# Patient Record
Sex: FEMALE | Race: WHITE | NOT HISPANIC OR LATINO | Employment: OTHER | URBAN - METROPOLITAN AREA
[De-identification: names, ages, dates, MRNs, and addresses within clinical notes are randomized per-mention and may not be internally consistent; named-entity substitution may affect disease eponyms.]

---

## 2017-12-19 ENCOUNTER — OFFICE VISIT (OUTPATIENT)
Dept: URGENT CARE | Facility: CLINIC | Age: 67
End: 2017-12-19
Payer: COMMERCIAL

## 2017-12-19 PROCEDURE — 99203 OFFICE O/P NEW LOW 30 MIN: CPT

## 2017-12-20 NOTE — PROGRESS NOTES
Assessment  1  Cough variant asthma (493 82) (J45 991)    Plan  Cough variant asthma    · Azithromycin 250 MG Oral Tablet (Zithromax Z-Gabe); TAKE AS DIRECTED   · Montelukast Sodium 10 MG Oral Tablet (Singulair); TAKE 1 TABLET DAILY ASDIRECTED    Discussion/Summary  Discussion Summary: We are treating this as a cough variant asthma  Use medication as written  Increase fluids  The other possibility is a low-grade infection with an atypical bacteria  If you fail with the Singulair, try the antibiotic  Follow-up with your family physician for further evaluation if he did not respond to either one of these interventions  Chief Complaint  1  Cough  Chief Complaint Free Text Note Form: Pt c/o cough X 4 weeks      History of Present Illness  HPI: Patient is apparently very sensitive to spray, and some perfumes, etc  She has been coughing now for about 4 weeks  Apparently it was made worse after she had her hair dyed recently  Hospital Based Practices Required Assessment:  Pain Assessment  the patient states they do not have pain  Abuse And Domestic Violence Screen   Yes, the patient is safe at home  Depression And Suicide Screen  No, the patient has not had thoughts of hurting themself  No, the patient has not felt depressed in the past 7 days  Prefered Language is  [place]  Primary Language is  English  Review of Systems  Focused-Female:  Constitutional: No fever, no chills, feels well, no tiredness, no recent weight gain or loss  ENT: no ear ache, no loss of hearing, no nosebleeds or nasal discharge, no sore throat or hoarseness  Respiratory: as noted in HPI  Musculoskeletal: no complaints of arthralgia, no myalgia, no joint swelling or stiffness, no limb pain or swelling  Integumentary: no complaints of skin rash or lesion, no itching or dry skin, no skin wounds  Current Meds   1  Simvastatin 10 MG Oral Tablet; Therapy: (Recorded:[id number]) to Recorded   2   Synthroid 50 MCG Oral Tablet; Therapy: (Recorded:[id number]) to Recorded    Allergies  1  No Known Drug Allergies    Vitals  Signs   Recorded: [id number] 12:29PM   Temperature: 98 1 F  Heart Rate: 78  Respiration: 16  Systolic: 490  Diastolic: 69  Height: 5 ft 1 in  Weight: 122 lb   BMI Calculated: 23 05  BSA Calculated: 1 53  O2 Saturation: 99  Pain Scale: 0    Physical Exam   Constitutional  General appearance: No acute distress, well appearing and well nourished  Eyes  Conjunctiva and lids: No swelling, erythema or discharge  Ears, Nose, Mouth, and Throat  External inspection of ears and nose: Normal    Oropharynx: Normal with no erythema, edema, exudate or lesions  Pulmonary  Respiratory effort: No increased work of breathing or signs of respiratory distress  Auscultation of lungs: Clear to auscultation  Cardiovascular  Auscultation of heart: Normal rate and rhythm, normal S1 and S2, without murmurs  Musculoskeletal  Gait and station: Normal    Digits and nails: Normal without clubbing or cyanosis  Inspection/palpation of joints, bones, and muscles: Normal    Skin  Skin and subcutaneous tissue: Normal without rashes or lesions  Psychiatric  Orientation to person, place, and time: Normal    Mood and affect: Normal    Additional Exam:  there is no tenderness over the facial sinuses        Signatures   Electronically signed by : Ernst Carrington MD; Dec 19 2017  6:04PM EST                       (Author)

## 2018-01-23 VITALS
RESPIRATION RATE: 16 BRPM | DIASTOLIC BLOOD PRESSURE: 69 MMHG | HEIGHT: 61 IN | WEIGHT: 122 LBS | BODY MASS INDEX: 23.03 KG/M2 | SYSTOLIC BLOOD PRESSURE: 125 MMHG | TEMPERATURE: 98.1 F | HEART RATE: 78 BPM | OXYGEN SATURATION: 99 %

## 2020-05-13 ENCOUNTER — APPOINTMENT (EMERGENCY)
Dept: RADIOLOGY | Facility: HOSPITAL | Age: 70
End: 2020-05-13
Payer: COMMERCIAL

## 2020-05-13 ENCOUNTER — HOSPITAL ENCOUNTER (EMERGENCY)
Facility: HOSPITAL | Age: 70
Discharge: HOME | End: 2020-05-14
Attending: EMERGENCY MEDICINE | Admitting: EMERGENCY MEDICINE
Payer: COMMERCIAL

## 2020-05-13 DIAGNOSIS — R06.02 SHORTNESS OF BREATH: ICD-10-CM

## 2020-05-13 DIAGNOSIS — R03.0 ELEVATED BLOOD PRESSURE READING: ICD-10-CM

## 2020-05-13 DIAGNOSIS — R68.89 CONGESTION OF THROAT: Primary | ICD-10-CM

## 2020-05-13 PROCEDURE — 94640 AIRWAY INHALATION TREATMENT: CPT

## 2020-05-13 PROCEDURE — 99285 EMERGENCY DEPT VISIT HI MDM: CPT

## 2020-05-13 PROCEDURE — 71045 X-RAY EXAM CHEST 1 VIEW: CPT

## 2020-05-13 PROCEDURE — 99285 EMERGENCY DEPT VISIT HI MDM: CPT | Performed by: EMERGENCY MEDICINE

## 2020-05-13 PROCEDURE — 94640 AIRWAY INHALATION TREATMENT: CPT | Performed by: EMERGENCY MEDICINE

## 2020-05-13 RX ORDER — LEVOTHYROXINE SODIUM 0.05 MG/1
50 TABLET ORAL
COMMUNITY
Start: 2018-07-31

## 2020-05-13 RX ORDER — FAMOTIDINE 10 MG
10 TABLET ORAL DAILY
COMMUNITY

## 2020-05-13 RX ORDER — METRONIDAZOLE 10 MG/G
1 GEL TOPICAL DAILY
COMMUNITY
Start: 2008-11-06

## 2020-05-13 RX ORDER — ALBUTEROL SULFATE 90 UG/1
2 AEROSOL, METERED RESPIRATORY (INHALATION) EVERY 4 HOURS PRN
COMMUNITY
Start: 2017-12-21 | End: 2020-07-20 | Stop reason: SDUPTHER

## 2020-05-13 RX ORDER — ALBUTEROL SULFATE 2.5 MG/3ML
5 SOLUTION RESPIRATORY (INHALATION) ONCE
Status: COMPLETED | OUTPATIENT
Start: 2020-05-13 | End: 2020-05-14

## 2020-05-14 VITALS
TEMPERATURE: 97.9 F | BODY MASS INDEX: 20.77 KG/M2 | HEART RATE: 79 BPM | SYSTOLIC BLOOD PRESSURE: 141 MMHG | RESPIRATION RATE: 14 BRPM | WEIGHT: 110 LBS | OXYGEN SATURATION: 97 % | HEIGHT: 61 IN | DIASTOLIC BLOOD PRESSURE: 64 MMHG

## 2020-05-14 LAB
ANION GAP SERPL CALCULATED.3IONS-SCNC: 5 MMOL/L (ref 4–13)
ATRIAL RATE: 68 BPM
ATRIAL RATE: 81 BPM
BASOPHILS # BLD AUTO: 0.03 THOUSANDS/ΜL (ref 0–0.1)
BASOPHILS NFR BLD AUTO: 0 % (ref 0–1)
BUN SERPL-MCNC: 14 MG/DL (ref 5–25)
CALCIUM SERPL-MCNC: 9.3 MG/DL (ref 8.3–10.1)
CHLORIDE SERPL-SCNC: 98 MMOL/L (ref 100–108)
CO2 SERPL-SCNC: 31 MMOL/L (ref 21–32)
CREAT SERPL-MCNC: 0.67 MG/DL (ref 0.6–1.3)
EOSINOPHIL # BLD AUTO: 0.03 THOUSAND/ΜL (ref 0–0.61)
EOSINOPHIL NFR BLD AUTO: 0 % (ref 0–6)
ERYTHROCYTE [DISTWIDTH] IN BLOOD BY AUTOMATED COUNT: 12.7 % (ref 11.6–15.1)
GFR SERPL CREATININE-BSD FRML MDRD: 89 ML/MIN/1.73SQ M
GLUCOSE SERPL-MCNC: 92 MG/DL (ref 65–140)
HCT VFR BLD AUTO: 37.7 % (ref 34.8–46.1)
HGB BLD-MCNC: 13 G/DL (ref 11.5–15.4)
IMM GRANULOCYTES # BLD AUTO: 0.04 THOUSAND/UL (ref 0–0.2)
IMM GRANULOCYTES NFR BLD AUTO: 1 % (ref 0–2)
LYMPHOCYTES # BLD AUTO: 2.28 THOUSANDS/ΜL (ref 0.6–4.47)
LYMPHOCYTES NFR BLD AUTO: 31 % (ref 14–44)
MCH RBC QN AUTO: 30.4 PG (ref 26.8–34.3)
MCHC RBC AUTO-ENTMCNC: 34.5 G/DL (ref 31.4–37.4)
MCV RBC AUTO: 88 FL (ref 82–98)
MONOCYTES # BLD AUTO: 0.73 THOUSAND/ΜL (ref 0.17–1.22)
MONOCYTES NFR BLD AUTO: 10 % (ref 4–12)
NEUTROPHILS # BLD AUTO: 4.37 THOUSANDS/ΜL (ref 1.85–7.62)
NEUTS SEG NFR BLD AUTO: 58 % (ref 43–75)
NT-PROBNP SERPL-MCNC: 166 PG/ML
P AXIS: 59 DEGREES
P AXIS: 65 DEGREES
PLATELET # BLD AUTO: 187 THOUSANDS/UL (ref 149–390)
PMV BLD AUTO: 8.9 FL (ref 8.9–12.7)
POTASSIUM SERPL-SCNC: 3.5 MMOL/L (ref 3.5–5.3)
PR INTERVAL: 154 MS
PR INTERVAL: 158 MS
QRS AXIS: 86 DEGREES
QRS AXIS: 90 DEGREES
QRSD INTERVAL: 86 MS
QRSD INTERVAL: 88 MS
QT INTERVAL: 390 MS
QT INTERVAL: 398 MS
QTC INTERVAL: 423 MS
QTC INTERVAL: 453 MS
RBC # BLD AUTO: 4.27 MILLION/UL (ref 3.81–5.12)
SODIUM SERPL-SCNC: 134 MMOL/L (ref 136–145)
T WAVE AXIS: 49 DEGREES
T WAVE AXIS: 59 DEGREES
TROPONIN I SERPL-MCNC: 0.03 NG/ML
TROPONIN I SERPL-MCNC: 0.03 NG/ML
VENTRICULAR RATE: 68 BPM
VENTRICULAR RATE: 81 BPM
WBC # BLD AUTO: 7.48 THOUSAND/UL (ref 4.31–10.16)

## 2020-05-14 PROCEDURE — 80048 BASIC METABOLIC PNL TOTAL CA: CPT | Performed by: EMERGENCY MEDICINE

## 2020-05-14 PROCEDURE — 83880 ASSAY OF NATRIURETIC PEPTIDE: CPT | Performed by: EMERGENCY MEDICINE

## 2020-05-14 PROCEDURE — 84484 ASSAY OF TROPONIN QUANT: CPT | Performed by: EMERGENCY MEDICINE

## 2020-05-14 PROCEDURE — 93005 ELECTROCARDIOGRAM TRACING: CPT

## 2020-05-14 PROCEDURE — 36415 COLL VENOUS BLD VENIPUNCTURE: CPT | Performed by: EMERGENCY MEDICINE

## 2020-05-14 PROCEDURE — 93010 ELECTROCARDIOGRAM REPORT: CPT | Performed by: INTERNAL MEDICINE

## 2020-05-14 PROCEDURE — 85025 COMPLETE CBC W/AUTO DIFF WBC: CPT | Performed by: EMERGENCY MEDICINE

## 2020-05-14 RX ADMIN — ALBUTEROL SULFATE 5 MG: 2.5 SOLUTION RESPIRATORY (INHALATION) at 00:25

## 2020-05-14 RX ADMIN — IPRATROPIUM BROMIDE 0.5 MG: 0.5 SOLUTION RESPIRATORY (INHALATION) at 00:25

## 2020-06-15 ENCOUNTER — TELEPHONE (OUTPATIENT)
Dept: PULMONOLOGY | Facility: HOSPITAL | Age: 70
End: 2020-06-15

## 2020-06-15 RX ORDER — AZELASTINE 1 MG/ML
1 SPRAY, METERED NASAL 2 TIMES DAILY
COMMUNITY
Start: 2020-05-20 | End: 2020-10-19

## 2020-06-15 RX ORDER — FLUTICASONE PROPIONATE 50 MCG
1 SPRAY, SUSPENSION (ML) NASAL
COMMUNITY
End: 2020-10-19

## 2020-06-15 RX ORDER — AZITHROMYCIN 250 MG/1
TABLET, FILM COATED ORAL
COMMUNITY
Start: 2017-12-19 | End: 2020-06-19 | Stop reason: ALTCHOICE

## 2020-06-15 RX ORDER — GUAIFENESIN 100 MG/5ML
10 SOLUTION ORAL
COMMUNITY
End: 2020-10-19

## 2020-06-15 RX ORDER — SIMVASTATIN 10 MG
10 TABLET ORAL
COMMUNITY
Start: 2018-03-02

## 2020-06-15 RX ORDER — FEXOFENADINE HCL 180 MG/1
180 TABLET ORAL DAILY
COMMUNITY
End: 2021-11-19 | Stop reason: HOSPADM

## 2020-06-18 ENCOUNTER — TELEPHONE (OUTPATIENT)
Dept: PULMONOLOGY | Facility: CLINIC | Age: 70
End: 2020-06-18

## 2020-06-19 ENCOUNTER — OFFICE VISIT (OUTPATIENT)
Dept: PULMONOLOGY | Facility: HOSPITAL | Age: 70
End: 2020-06-19
Payer: COMMERCIAL

## 2020-06-19 VITALS
SYSTOLIC BLOOD PRESSURE: 140 MMHG | OXYGEN SATURATION: 97 % | TEMPERATURE: 98.2 F | HEART RATE: 75 BPM | BODY MASS INDEX: 21.23 KG/M2 | DIASTOLIC BLOOD PRESSURE: 60 MMHG | WEIGHT: 115.4 LBS | HEIGHT: 62 IN | RESPIRATION RATE: 18 BRPM

## 2020-06-19 DIAGNOSIS — J45.30 MILD PERSISTENT ASTHMA WITHOUT COMPLICATION: Primary | ICD-10-CM

## 2020-06-19 PROCEDURE — 99245 OFF/OP CONSLTJ NEW/EST HI 55: CPT | Performed by: INTERNAL MEDICINE

## 2020-07-20 DIAGNOSIS — J45.30 MILD PERSISTENT ASTHMA, UNSPECIFIED WHETHER COMPLICATED: Primary | ICD-10-CM

## 2020-07-20 RX ORDER — ALBUTEROL SULFATE 90 UG/1
2 AEROSOL, METERED RESPIRATORY (INHALATION) EVERY 4 HOURS PRN
Qty: 1 INHALER | Refills: 5 | Status: SHIPPED | OUTPATIENT
Start: 2020-07-20 | End: 2021-05-17 | Stop reason: SDUPTHER

## 2020-10-19 ENCOUNTER — OFFICE VISIT (OUTPATIENT)
Dept: PULMONOLOGY | Facility: HOSPITAL | Age: 70
End: 2020-10-19
Payer: COMMERCIAL

## 2020-10-19 VITALS
OXYGEN SATURATION: 97 % | HEART RATE: 82 BPM | DIASTOLIC BLOOD PRESSURE: 70 MMHG | BODY MASS INDEX: 21.98 KG/M2 | SYSTOLIC BLOOD PRESSURE: 138 MMHG | WEIGHT: 116.4 LBS | TEMPERATURE: 97 F | HEIGHT: 61 IN

## 2020-10-19 DIAGNOSIS — J45.30 MILD PERSISTENT ASTHMA WITHOUT COMPLICATION: ICD-10-CM

## 2020-10-19 DIAGNOSIS — J45.30 MILD PERSISTENT ASTHMA, UNSPECIFIED WHETHER COMPLICATED: Primary | ICD-10-CM

## 2020-10-19 PROCEDURE — 99213 OFFICE O/P EST LOW 20 MIN: CPT | Performed by: INTERNAL MEDICINE

## 2020-10-19 RX ORDER — ALBUTEROL SULFATE 2.5 MG/3ML
2.5 SOLUTION RESPIRATORY (INHALATION) EVERY 6 HOURS PRN
COMMUNITY
End: 2021-11-19 | Stop reason: HOSPADM

## 2020-12-28 DIAGNOSIS — J45.30 MILD PERSISTENT ASTHMA WITHOUT COMPLICATION: Primary | ICD-10-CM

## 2020-12-28 RX ORDER — BECLOMETHASONE DIPROPIONATE HFA 40 UG/1
2 AEROSOL, METERED RESPIRATORY (INHALATION) 2 TIMES DAILY
Qty: 1 INHALER | Refills: 5 | Status: SHIPPED | OUTPATIENT
Start: 2020-12-28 | End: 2021-11-19 | Stop reason: HOSPADM

## 2021-01-04 ENCOUNTER — TELEPHONE (OUTPATIENT)
Dept: OTHER | Facility: OTHER | Age: 71
End: 2021-01-04

## 2021-01-05 NOTE — PROGRESS NOTES
Called patient last evening, she was complaining of shortness of breath  She did use her nebulizer which she said helped  She does not have any associated wheezing, cough, fever, chills or palpitations  She does not have any known COVID contacts  She checked he temperature and it was 96F and her 02 was at 98%  Discussed that is possible this could be her asthma but her symptoms are not typical of an asthma exacerbation  Discussed that she will use her nebulizer once more before bed and can also try her rescue inhaler in the interim and she will call us tomorrow and we can discuss a short course of prednisone and possible imaging  Discussed also that if she really feels unwell, may need to go the ED for evaluation to rule out other causes  She expressed understanding and will try her nebulizer again this evening

## 2021-01-05 NOTE — TELEPHONE ENCOUNTER
Patricio Gardy called because she is having trouble breathing that she believes is due to her asthma

## 2021-01-07 NOTE — PROGRESS NOTES
PT Evaluation     Today's date: 2021  Patient name: Monisha Samuel  : 1950  MRN: [id number]  Referring provider: Myles Dawkins MD  Dx:   Encounter Diagnosis     ICD-10-CM    1  Atypical facial pain  G50 1                   Assessment  Assessment details: Juan Ramon Siddiqi is a 65year old female presenting to physical therapy with atypical facial pain  Patient presents with pain, decreased range of motion, postural deficits and decreased tolerance to activity  Due to these impairments patient has difficulty performing activities of daily living, recreational activities and engaging in social activities  Patient would benefit from skilled physical therapy services to address these impairments and to maximize function  Thank you for the referral    Impairments: abnormal muscle tone, abnormal or restricted ROM, abnormal movement, impaired physical strength, lacks appropriate home exercise program, pain with function and poor posture   Understanding of Dx/Px/POC: excellent  Goals  Impairment Goals:  1 ) Pt will have decreased sx at their worst by 50% in 2-4 weeks  2 ) Pt will have decreased tenderness to palpation right masseter and pterygoid musculature by 50% in 3-4 weeks  3 ) Pt will improved jaw opening without pain on the right in 4-6 weeks  4 ) Pt will have decreased headache frequency to 1-2x/week in 6-8 weeks  Functional Goals:  1 ) Pt will be independent in their home exercise program in 1 week  2 ) Pt will be able to complete all ADL's without jaw pain in 6-8 weeks  4 ) Pt will have an improved FOTO score of 95/100 in 6-8 weeks  Plan  Plan details: Patient does not feel comfortable being inside the clinic due to the pandemic and wishes to follow up virtually     Patient would benefit from: skilled PT  Planned therapy interventions: joint mobilization, manual therapy, neuromuscular re-education, patient education, postural training, strengthening, therapeutic exercise, home exercise program, graded exercise, graded activity, therapeutic activities, stretching, activity modification and abdominal trunk stabilization  Frequency: 1x week  Duration in weeks: 8  Plan of Care beginning date: 2021  Plan of Care expiration date: 3/4/2021  Treatment plan discussed with: patient        Subjective Evaluation    History of Present Illness  Mechanism of injury: Duane Goldman is a 65year old female presenting to physical therapy with atypical facial pain  She explains that her pain originated as tooth type pain and she was under the assumption that she needed a root canal  This was ruled out by x ray  She has been dealing with headache type pain for a few years, physical therapy has helped in the past  Overall her headaches are not too bad but she does get them occasionally  Her sx right now consist more of jaw tightness  She describes this as a fairly constant discomfort and explains that her right cheek feels puffy  Her jaw doesn't limit her food intake  She just started a muscle relaxer as of last night, moderate relief so far  She believes between her neck tightness and the stress during the last year with the virus, this has made her jaw more irritated lately  She has used night splints in the past for jaw related issues but did not find relief with this  Aggs: being in poor posture, stress  Eases: Rest, chin tucks, stretching, muscle relaxer  Pain  Current pain ratin  At best pain ratin  At worst pain ratin          Objective     Static Posture     Head  Forward  Shoulders  Rounded  Palpation     Right   Muscle spasm in the scalenes, sternocleidomastoid, suboccipitals and upper trapezius  Tenderness of the cervical paraspinals, levator scapulae, pectoralis major, pectoralis minor, scalenes, sternocleidomastoid, suboccipitals, upper trapezius, masseter, temporalis, lateral pterygoid and medial pterygoid       Active Range of Motion   Cervical/Thoracic Spine       Cervical    Left lateral flexion:  with pain Restriction level: minimal  Right lateral flexion:  WFL  Left rotation:  Restriction level: minimal  Right rotation:  Restriction level: minimal  TMJ   Jaw observations: facial symmetry within normal limits  Jaw malocclusion impact on speech: negligible  Jaw trauma: no  Joint sounds left: normal  Joint sounds right: normal  Lateral bite test, Left: no pain  Lateral bite test, right: no pain  Opening (mm): 45   Lateral excursion, left (mm): 10 and within normal limits  Lateral excursion, right (mm)t: 10 and within normal limits       Flowsheet Rows      Most Recent Value   PT/OT G-Codes   Current Score  97   Projected Score  98             Precautions: Asthma, GERD      Manuals 1/7                                                                Neuro Re-Ed             Chin tuck 10x10''            Shoulder blade squeezes 10x10''            UT/Lev s' 5x15''                                                                Ther Ex             STM ball massage to right masseter HEP                                                                                                       Ther Activity                                       Gait Training                                       Modalities

## 2021-01-08 ENCOUNTER — EVALUATION (OUTPATIENT)
Dept: PHYSICAL THERAPY | Facility: CLINIC | Age: 71
End: 2021-01-08
Payer: COMMERCIAL

## 2021-01-08 DIAGNOSIS — G50.1 ATYPICAL FACIAL PAIN: Primary | ICD-10-CM

## 2021-01-08 PROCEDURE — 97161 PT EVAL LOW COMPLEX 20 MIN: CPT | Performed by: PHYSICAL THERAPIST

## 2021-01-08 PROCEDURE — 97110 THERAPEUTIC EXERCISES: CPT | Performed by: PHYSICAL THERAPIST

## 2021-01-08 NOTE — LETTER
2021    lAex Colbert MD  [address] [facility]    Patient: Sara Alvares   YOB: 1950   Date of Visit: 2021     Encounter Diagnosis     ICD-10-CM    1  Atypical facial pain  G50 1        Dear Dr Emanuel Meter:    Thank you for your recent referral of Sara Alvares  Please review the attached evaluation summary from Lupe's recent visit  Please verify that you agree with the plan of care by signing the attached order  If you have any questions or concerns, please do not hesitate to call  I sincerely appreciate the opportunity to share in the care of one of your patients and hope to have another opportunity to work with you in the near future  Sincerely,    Salo Delgado, PT      Referring Provider:      I certify that I have read the below Plan of Care and certify the need for these services furnished under this plan of treatment while under my care  Alex Colbert MD  [facility] [address]  Via Fax: [phone]          PT Evaluation     Today's date: 2021  Patient name: Sara Alvares  : 1950  MRN: [id number]  Referring provider: Elza Gordillo MD  Dx:   Encounter Diagnosis     ICD-10-CM    1  Atypical facial pain  G50 1                   Assessment  Assessment details: Titi Chou is a 65year old female presenting to physical therapy with atypical facial pain  Patient presents with pain, decreased range of motion, postural deficits and decreased tolerance to activity  Due to these impairments patient has difficulty performing activities of daily living, recreational activities and engaging in social activities  Patient would benefit from skilled physical therapy services to address these impairments and to maximize function   Thank you for the referral    Impairments: abnormal muscle tone, abnormal or restricted ROM, abnormal movement, impaired physical strength, lacks appropriate home exercise program, pain with function and poor posture   Understanding of Dx/Px/POC: excellent  Goals  Impairment Goals:  1 ) Pt will have decreased sx at their worst by 50% in 2-4 weeks  2 ) Pt will have decreased tenderness to palpation right masseter and pterygoid musculature by 50% in 3-4 weeks  3 ) Pt will improved jaw opening without pain on the right in 4-6 weeks  4 ) Pt will have decreased headache frequency to 1-2x/week in 6-8 weeks  Functional Goals:  1 ) Pt will be independent in their home exercise program in 1 week  2 ) Pt will be able to complete all ADL's without jaw pain in 6-8 weeks  4 ) Pt will have an improved FOTO score of 95/100 in 6-8 weeks  Plan  Plan details: Patient does not feel comfortable being inside the clinic due to the pandemic and wishes to follow up virtually  Patient would benefit from: skilled PT  Planned therapy interventions: joint mobilization, manual therapy, neuromuscular re-education, patient education, postural training, strengthening, therapeutic exercise, home exercise program, graded exercise, graded activity, therapeutic activities, stretching, activity modification and abdominal trunk stabilization  Frequency: 1x week  Duration in weeks: 8  Plan of Care beginning date: 1/7/2021  Plan of Care expiration date: 3/4/2021  Treatment plan discussed with: patient        Subjective Evaluation    History of Present Illness  Mechanism of injury: Kingsley Alcala is a 65year old female presenting to physical therapy with atypical facial pain  She explains that her pain originated as tooth type pain and she was under the assumption that she needed a root canal  This was ruled out by x ray  She has been dealing with headache type pain for a few years, physical therapy has helped in the past  Overall her headaches are not too bad but she does get them occasionally  Her sx right now consist more of jaw tightness  She describes this as a fairly constant discomfort and explains that her right cheek feels puffy   Her jaw doesn't limit her food intake  She just started a muscle relaxer as of last night, moderate relief so far  She believes between her neck tightness and the stress during the last year with the virus, this has made her jaw more irritated lately  She has used night splints in the past for jaw related issues but did not find relief with this  Aggs: being in poor posture, stress  Eases: Rest, chin tucks, stretching, muscle relaxer  Pain  Current pain ratin  At best pain ratin  At worst pain ratin          Objective     Static Posture     Head  Forward  Shoulders  Rounded  Palpation     Right   Muscle spasm in the scalenes, sternocleidomastoid, suboccipitals and upper trapezius  Tenderness of the cervical paraspinals, levator scapulae, pectoralis major, pectoralis minor, scalenes, sternocleidomastoid, suboccipitals, upper trapezius, masseter, temporalis, lateral pterygoid and medial pterygoid       Active Range of Motion   Cervical/Thoracic Spine       Cervical    Left lateral flexion:  with pain Restriction level: minimal  Right lateral flexion:  WFL  Left rotation:  Restriction level: minimal  Right rotation:  Restriction level: minimal  TMJ   Jaw observations: facial symmetry within normal limits  Jaw malocclusion impact on speech: negligible  Jaw trauma: no  Joint sounds left: normal  Joint sounds right: normal  Lateral bite test, Left: no pain  Lateral bite test, right: no pain  Opening (mm): 45   Lateral excursion, left (mm): 10 and within normal limits  Lateral excursion, right (mm)t: 10 and within normal limits       Flowsheet Rows      Most Recent Value   PT/OT G-Codes   Current Score  97   Projected Score  98             Precautions: Asthma, GERD      Manuals                                                                 Neuro Re-Ed             Chin tuck 10x10''            Shoulder blade squeezes 10x10''            UT/Lev s' 5x15'' Ther Ex             STM ball massage to right masseter HEP                                                                                                       Ther Activity                                       Gait Training                                       Modalities

## 2021-01-12 ENCOUNTER — TRANSCRIBE ORDERS (OUTPATIENT)
Dept: PHYSICAL THERAPY | Facility: CLINIC | Age: 71
End: 2021-01-12

## 2021-01-12 DIAGNOSIS — G50.1 ATYPICAL FACIAL PAIN: Primary | ICD-10-CM

## 2021-01-19 ENCOUNTER — TELEMEDICINE (OUTPATIENT)
Dept: PHYSICAL THERAPY | Facility: CLINIC | Age: 71
End: 2021-01-19
Payer: COMMERCIAL

## 2021-01-19 DIAGNOSIS — G50.1 ATYPICAL FACIAL PAIN: Primary | ICD-10-CM

## 2021-01-19 PROCEDURE — 97535 SELF CARE MNGMENT TRAINING: CPT | Performed by: PHYSICAL THERAPIST

## 2021-01-19 NOTE — PROGRESS NOTES
Telemedicine consent    Patient: Sneha Tellez  Provider: Maximilian Johnson PT  Provider located at [address]  [address] [facility] [address] [address]    After connecting through telephone, the patient was identified by name and date of birth  Sneha Tellez was informed that this is a telemedicine visit which may not be secure and therefore, might not be HIPAA-compliant  My office door was closed  No one else was in the room  She acknowledged consent and understanding of privacy and security of the platform  The patient has agreed to participate and understands they can discontinue the visit at any time  Patient is aware this is a billable service  Daily Note     Today's date: 2021  Patient name: Sneha Tellez  : 1950  MRN: [id number]  Referring provider: Susana Carbone MD  Dx:   Encounter Diagnosis     ICD-10-CM    1  Atypical facial pain  G50 1                   Subjective: Hipolito Watts explains that her pain remains fairly low and she continues to not be limited in her normal everyday activities  The ping pong ball to massage her jaw has been working terrifically  She is also not taking a muscle relaxer any longer  Objective: See treatment diary below      Assessment: Hipolito Watts has responded well to her exercises to date and is planning on continuing the current routine consistently to continue to reduce jaw tightness bilaterally  She is encouraged to continue completing all exercises and stretches 3x/day  She is also educated on intensity and duration of soft tissue mobilizations to be performed  Will follow up with Hipolito Watts in two weeks to assess and discuss symptoms to determine appropriate course of treatment moving forward  Plan: Continue per plan of care        Precautions: Asthma, GERD      Manuals                                                                 Neuro Re-Ed             Chin tuck 10x10'' Shoulder blade squeezes 10x10''            UT/Lev s' 5x15''                                                                Ther Ex             STM ball massage to right masseter HEP                                                                                                       Ther Activity                                       Gait Training                                       Modalities

## 2021-03-03 DIAGNOSIS — Z23 ENCOUNTER FOR IMMUNIZATION: ICD-10-CM

## 2021-04-03 ENCOUNTER — TELEPHONE (OUTPATIENT)
Dept: OTHER | Facility: OTHER | Age: 71
End: 2021-04-03

## 2021-04-03 DIAGNOSIS — J45.30 MILD PERSISTENT ASTHMA WITHOUT COMPLICATION: ICD-10-CM

## 2021-04-03 DIAGNOSIS — J45.30 MILD PERSISTENT ASTHMA WITHOUT COMPLICATION: Primary | ICD-10-CM

## 2021-04-03 RX ORDER — ALBUTEROL SULFATE 2.5 MG/3ML
2.5 SOLUTION RESPIRATORY (INHALATION) EVERY 6 HOURS PRN
Qty: 120 VIAL | Refills: 6 | Status: SHIPPED | OUTPATIENT
Start: 2021-04-03 | End: 2021-04-03 | Stop reason: SDUPTHER

## 2021-04-03 RX ORDER — ALBUTEROL SULFATE 2.5 MG/3ML
2.5 SOLUTION RESPIRATORY (INHALATION) EVERY 6 HOURS PRN
Qty: 120 VIAL | Refills: 6 | Status: SHIPPED | OUTPATIENT
Start: 2021-04-03

## 2021-04-03 NOTE — TELEPHONE ENCOUNTER
[phone] / pt Aicha Azul 46 / re: pt has been experiencing SOB, states her inhaler is not helping- would like to know if medication for her nebulizer could be called in to [address] [place] because the one she has

## 2021-04-03 NOTE — TELEPHONE ENCOUNTER
TT from Dr Jayla Patel advising that the script was sent to the specialty pharmacy      I called the pt - she is aware

## 2021-04-07 ENCOUNTER — TELEPHONE (OUTPATIENT)
Dept: PULMONOLOGY | Facility: CLINIC | Age: 71
End: 2021-04-07

## 2021-04-07 NOTE — TELEPHONE ENCOUNTER
Patient calling stating she got her second vaccine dose Wednesday and she threw up at night  She states anytime she throws up she gets an asthma flare up  On Friday she went to the eye doctor and noticed some SOB started  She was using her albuterol 3 times a day and even used the nebulizer on Saturday  She slowly decreased to two or one times a day and felt better yesterday  She states now she is very SOB again and she just wants advice on what she should do   Please advise [phone]

## 2021-04-08 ENCOUNTER — TELEMEDICINE (OUTPATIENT)
Dept: PULMONOLOGY | Facility: HOSPITAL | Age: 71
End: 2021-04-08
Payer: COMMERCIAL

## 2021-04-08 DIAGNOSIS — J45.31 MILD PERSISTENT ASTHMA WITH ACUTE EXACERBATION: Primary | ICD-10-CM

## 2021-04-08 PROCEDURE — 99213 OFFICE O/P EST LOW 20 MIN: CPT | Performed by: PHYSICIAN ASSISTANT

## 2021-04-08 NOTE — PROGRESS NOTES
Virtual Regular Visit      Assessment/Plan:    Problem List Items Addressed This Visit        Respiratory    Mild persistent asthma - Primary          Patient will increase the frequency of her albuterol Q 4 Q 6 hours over the next couple days  Continue her QVAR  Did offer prednisone burst however patient would like to just see how goes on her current medication regimen  States she will call our office with any worsening or recurrent symptoms  Patient instructed to go to emergency room with any significant  Asthma symptoms  She is agreeable to plan  Recommend follow-up in next month or sooner if needed  Reason for visit is   Shortness of breath and wheezing  Chief Complaint   Patient presents with    Virtual Regular Visit        Encounter provider Vitaly Ventura PA-C    Provider located at [place] [address] [address] [place] [address]      Recent Visits  No visits were found meeting these conditions  Showing recent visits within past 7 days and meeting all other requirements     Today's Visits  Date Type Provider Dept   04/08/21 Telemedicine Vitaly Ventura PA-C [facility]   Showing today's visits and meeting all other requirements     Future Appointments  No visits were found meeting these conditions  Showing future appointments within next 150 days and meeting all other requirements        The patient was identified by name and date of birth  Chelsiegala Valdiviad was informed that this is a telemedicine visit and that the visit is being conducted through [facility] and patient was informed that this is a secure, HIPAA-compliant platform  She agrees to proceed     My office door was closed  No one else was in the room  She acknowledged consent and understanding of privacy and security of the video platform   The patient has agreed to participate and understands they can discontinue the visit at any time     Patient is aware this is a billable service  Subjective  Eduarda Shearer is a 65 y o  female       HPI    17-year-old female with history of mild persistent asthma on QVAR and albuterol  Patient states received her 2nd COVID vaccine last Wednesday and vomited at night  On Friday she went to her eye doctor in notice some increased shortness of breath and some wheezing  Center was difficult to catch her breath and especially with ambulation  Did increased frequency of her albuterol and felt somewhat better  Yesterday however had some recurrent symptoms of dyspnea/wheezing  Today states feeling improved  Denies any fevers, chills, chest pain, chest tightness  No wheezing today  Did suggest maybe short burst of prednisone however patient would like to discontinue her albuterol QVAR and see if her symptoms improved  If they worsen she will call our office or go directly to the emergency department for evaluation    Past Medical History:   Diagnosis Date    Asthma     Disease of thyroid gland     GERD (gastroesophageal reflux disease)     Hyperlipidemia        Past Surgical History:   Procedure Laterality Date    COLONOSCOPY      EYE SURGERY Bilateral     preventative glaucoma surgery    THYROID SURGERY         Current Outpatient Medications   Medication Sig Dispense Refill    albuterol (2 5 mg/3 mL) 0 083 % nebulizer solution Take 1 vial (2 5 mg total) by nebulization every 6 (six) hours as needed for wheezing or shortness of breath 120 vial 6    albuterol (PROVENTIL HFA,VENTOLIN HFA) 90 mcg/act inhaler Inhale 2 puffs every 4 (four) hours as needed for shortness of breath 1 Inhaler 5    Albuterol Sulfate (albuterol, FOR EMS ONLY,) (2 5 mg/3 mL) 0 083 % nebulizer solution Take 2 5 mg by nebulization every 6 (six) hours as needed for wheezing      beclomethasone (Qvar RediHaler) 40 MCG/ACT inhaler Inhale 2 puffs 2 (two) times a day 1 Inhaler 5    Cholecalciferol 50 MCG (2000 UT) CAPS Take 1 tablet by mouth daily      conjugated estrogens (PREMARIN) vaginal cream Insert 1 g into the vagina once a week      esomeprazole (NexIUM) 20 mg capsule Take 20 mg by mouth daily at bedtime      famotidine (PEPCID) 10 mg tablet Take 10 mg by mouth daily      fexofenadine (ALLEGRA) 180 MG tablet Take 180 mg by mouth daily      fluticasone (VERAMYST) 27 5 MCG/SPRAY nasal spray 1 spray into each nostril daily      Influenza Virus Vacc Split PF 0 5 ML YURIDIA inject 0 5 milliliter intramuscularly      levothyroxine (Synthroid) 50 mcg tablet Take 50 mcg by mouth daily in the early morning      metroNIDAZOLE (Metrogel) 1 % gel Apply 1 application topically daily      Peak Flow Meter NANDA 1 Act by Does not apply route 2 (two) times a day      polyethylene glycol-propylene glycol (SYSTANE) 0 4-0 3 % Apply 1 drop to eye Three times a day      simvastatin (ZOCOR) 10 mg tablet Take 10 mg by mouth       No current facility-administered medications for this visit  Allergies   Allergen Reactions    Clotrimazole Vomiting    Naproxen Nausea Only    Other      Very sensitive to strong perfumes, sprays, smoke and exercise etc   Recently dx with asthma    Tramadol Nausea Only       Review of Systems    Per HPI   all other systems negative  Video Exam    There were no vitals filed for this visit  patient denies fever  Physical Exam  Constitutional:       General: She is not in acute distress  Appearance: Normal appearance  She is not ill-appearing, toxic-appearing or diaphoretic  HENT:      Head: Normocephalic  Pulmonary:      Effort: Pulmonary effort is normal  No respiratory distress  Comments:   No conversational dyspnea or audible wheezing  Neurological:      Mental Status: She is alert  Psychiatric:         Mood and Affect: Mood normal           20 minutes spent with patient      VIRTUAL VISIT DISCLAIMER    [facility] acknowledges that she has consented to an online visit or consultation  She understands that the online visit is based solely on information provided by her, and that, in the absence of a face-to-face physical evaluation by the physician, the diagnosis she receives is both limited and provisional in terms of accuracy and completeness  This is not intended to replace a full medical face-to-face evaluation by the physician  Hina Barnett understands and accepts these terms

## 2021-04-09 DIAGNOSIS — J45.31 MILD PERSISTENT ASTHMA WITH ACUTE EXACERBATION: Primary | ICD-10-CM

## 2021-04-09 RX ORDER — PREDNISONE 20 MG/1
40 TABLET ORAL DAILY
Qty: 10 TABLET | Refills: 0 | Status: SHIPPED | OUTPATIENT
Start: 2021-04-09 | End: 2021-11-19 | Stop reason: HOSPADM

## 2021-04-12 ENCOUNTER — TELEPHONE (OUTPATIENT)
Dept: PULMONOLOGY | Facility: CLINIC | Age: 71
End: 2021-04-12

## 2021-04-12 NOTE — TELEPHONE ENCOUNTER
Patient calling stating she was prescribed 20mg of Prednisone to take twice a day  Patient states the Prednisone upset her stomach and is afraid to take the second pill   Please advise [phone]

## 2021-04-12 NOTE — TELEPHONE ENCOUNTER
Called and spoke to patient informed her [facility], she advised the patient to take just 20 mg and to make sure she is taking it with food  Patient stated she did have a fuill meal prior to taking medication   I advised her to see how she feels in the

## 2021-04-12 NOTE — TELEPHONE ENCOUNTER
Storm ,    The prescription that was sent to the pharmacy was prednisone 20 mg tablets: Take two tablets daily x 5 days, so she should have 20 mg tablets  Can you confirm with the patient? The pharmacy should have dispensed as requested  Thank you      Doraine Meigs, CRNP

## 2021-04-30 ENCOUNTER — TELEMEDICINE (OUTPATIENT)
Dept: PULMONOLOGY | Facility: CLINIC | Age: 71
End: 2021-04-30
Payer: COMMERCIAL

## 2021-04-30 ENCOUNTER — TELEPHONE (OUTPATIENT)
Dept: PULMONOLOGY | Facility: CLINIC | Age: 71
End: 2021-04-30

## 2021-04-30 DIAGNOSIS — J45.31 MILD PERSISTENT ASTHMA WITH ACUTE BRONCHITIS AND ACUTE EXACERBATION: Primary | ICD-10-CM

## 2021-04-30 DIAGNOSIS — J20.9 MILD PERSISTENT ASTHMA WITH ACUTE BRONCHITIS AND ACUTE EXACERBATION: Primary | ICD-10-CM

## 2021-04-30 PROCEDURE — 99213 OFFICE O/P EST LOW 20 MIN: CPT | Performed by: PHYSICIAN ASSISTANT

## 2021-04-30 RX ORDER — AZITHROMYCIN 250 MG/1
TABLET, FILM COATED ORAL
Qty: 6 TABLET | Refills: 0 | Status: SHIPPED | OUTPATIENT
Start: 2021-04-30 | End: 2021-05-04

## 2021-04-30 NOTE — ASSESSMENT & PLAN NOTE
I suspect patient is having an acute bronchitis brought on by seasonal allergies  I recommend that she remain on Flonase, Mucinex OTC and resume her Claritin  From a pulmonary standpoint  She will remain on QVAR as prescribed  I instructed her to increase the use of her albuterol nebulized every 6 hours  She does not feel she needs steroids at this time but I will prescribed a Z-Gabe given the duration of symptoms  Which will provide anti-inflammatory and  Antibacterial benefits if necessary

## 2021-04-30 NOTE — PROGRESS NOTES
Virtual Regular Visit      Assessment/Plan:    Problem List Items Addressed This Visit        Respiratory    Mild persistent asthma with acute bronchitis and acute exacerbation - Primary      I suspect patient is having an acute bronchitis brought on by seasonal allergies  I recommend that she remain on Flonase, Mucinex OTC and resume her Claritin  From a pulmonary standpoint  She will remain on QVAR as prescribed  I instructed her to increase the use of her albuterol nebulized every 6 hours  She does not feel she needs steroids at this time but I will prescribed a Z-Gabe given the duration of symptoms  Which will provide anti-inflammatory and  Antibacterial benefits if necessary  Relevant Medications    azithromycin (ZITHROMAX) 250 mg tablet               Reason for visit is   Chief Complaint   Patient presents with    Virtual Regular Visit        [facility]    Provider located at [address] [place] [id number] [phone]      Recent Visits  No visits were found meeting these conditions  Showing recent visits within past 7 days and meeting all other requirements     Today's Visits  Date Type Provider Dept   04/30/21 Telemedicine Puneet Quiles PA-C [facility]   Showing today's visits and meeting all other requirements     Future Appointments  No visits were found meeting these conditions  Showing future appointments within next 150 days and meeting all other requirements        The patient was identified by name and date of birth  Ulysses Strouds was informed that this is a telemedicine visit and that the visit is being conducted through [facility] and patient was informed that this is not a secure, HIPAA-compliant platform  She agrees to proceed     My office door was closed  No one else was in the room    She acknowledged consent and understanding of privacy and security of the video platform  The patient has agreed to participate and understands they can discontinue the visit at any time  Patient is aware this is a billable service  Subjective  Peter Brito is a 65 y o  female   Presents via virtual visit for sick visit  Patient has past medical history positive for mild persistent asthma  HPI       Over the course of the last month or so patient has had worsening respiratory symptoms  It started 1 week after her 2nd dose of the COVID vaccine including worsening shortness of breath, wheezing cough  Patient was seen by [facility] history for sick visit he was instructed to increase her use of albuterol nebulizer continue QVAR  She called back later requesting prednisone which was filled  She completed 5 day course and felt that she was completely fine  She resumed her normal activities  She was able to walk 4 miles a day  Afterwards she again slowly began to develop worsening chest congestion that is new  Denies significant sputum production but feels that he is stuck in her chest   No hemoptysis  Denies wheezing  She is using her albuterol rescue inhaler multiple times a day  She has used her nebulizer only twice since having it  She remains compliant on QVAR  He reports very bad postnasal drip and congestion in her nose as well  She has been taking Flonase and Mucinex for this with some relief but again no resolution  She feels that she does not require more steroids at this time  Denies sick contacts  Denies fevers or chills  No GI or urinary symptoms  Patient reports pain to her anterior chest when she presses on her sternum  She stopped using a brought because of the pain  Heating pad seems to help but she was concerned that it could also be the cause       Past Medical History:   Diagnosis Date    Asthma     Disease of thyroid gland     GERD (gastroesophageal reflux disease)     Hyperlipidemia        Past Surgical History:   Procedure Laterality Date    COLONOSCOPY      EYE SURGERY Bilateral     preventative glaucoma surgery    THYROID SURGERY         Current Outpatient Medications   Medication Sig Dispense Refill    albuterol (2 5 mg/3 mL) 0 083 % nebulizer solution Take 1 vial (2 5 mg total) by nebulization every 6 (six) hours as needed for wheezing or shortness of breath 120 vial 6    albuterol (PROVENTIL HFA,VENTOLIN HFA) 90 mcg/act inhaler Inhale 2 puffs every 4 (four) hours as needed for shortness of breath 1 Inhaler 5    Albuterol Sulfate (albuterol, FOR EMS ONLY,) (2 5 mg/3 mL) 0 083 % nebulizer solution Take 2 5 mg by nebulization every 6 (six) hours as needed for wheezing      azithromycin (ZITHROMAX) 250 mg tablet Take 2 tablets today then 1 tablet daily x 4 days 6 tablet 0    beclomethasone (Qvar RediHaler) 40 MCG/ACT inhaler Inhale 2 puffs 2 (two) times a day 1 Inhaler 5    Cholecalciferol 50 MCG (2000 UT) CAPS Take 1 tablet by mouth daily      conjugated estrogens (PREMARIN) vaginal cream Insert 1 g into the vagina once a week      esomeprazole (NexIUM) 20 mg capsule Take 20 mg by mouth daily at bedtime      famotidine (PEPCID) 10 mg tablet Take 10 mg by mouth daily      fexofenadine (ALLEGRA) 180 MG tablet Take 180 mg by mouth daily      fluticasone (VERAMYST) 27 5 MCG/SPRAY nasal spray 1 spray into each nostril daily      Influenza Virus Vacc Split PF 0 5 ML YURIDIA inject 0 5 milliliter intramuscularly      levothyroxine (Synthroid) 50 mcg tablet Take 50 mcg by mouth daily in the early morning      metroNIDAZOLE (Metrogel) 1 % gel Apply 1 application topically daily      Peak Flow Meter NANDA 1 Act by Does not apply route 2 (two) times a day      polyethylene glycol-propylene glycol (SYSTANE) 0 4-0 3 % Apply 1 drop to eye Three times a day      predniSONE 20 mg tablet Take 2 tablets (40 mg total) by mouth daily 10 tablet 0    simvastatin (ZOCOR) 10 mg tablet Take 10 mg by mouth       No current facility-administered medications for this visit  Allergies   Allergen Reactions    Clotrimazole Vomiting    Naproxen Nausea Only    Other      Very sensitive to strong perfumes, sprays, smoke and exercise etc   Recently dx with asthma    Tramadol Nausea Only       Review of Systems    Video Exam    There were no vitals filed for this visit  Physical Exam  Vitals signs and nursing note reviewed  Constitutional:       General: She is not in acute distress  Appearance: Normal appearance  HENT:      Head: Normocephalic and atraumatic  Right Ear: External ear normal       Left Ear: External ear normal    Eyes:      Extraocular Movements: Extraocular movements intact  Conjunctiva/sclera: Conjunctivae normal       Pupils: Pupils are equal, round, and reactive to light  Neck:      Musculoskeletal: Normal range of motion and neck supple  No muscular tenderness  Pulmonary:      Effort: Pulmonary effort is normal  No respiratory distress  Breath sounds: No wheezing  Comments: No audible wheeze  Musculoskeletal: Normal range of motion  General: No tenderness or deformity  Skin:     General: Skin is warm and dry  Neurological:      General: No focal deficit present  Mental Status: She is alert and oriented to person, place, and time  Mental status is at baseline  Psychiatric:         Mood and Affect: Mood normal          Behavior: Behavior normal          Thought Content: Thought content normal          Judgment: Judgment normal           I spent 20 minutes directly with the patient during this visit      VIRTUAL VISIT DISCLAIMER    Elba Escobedo acknowledges that she has consented to an online visit or consultation  She understands that the online visit is based solely on information provided by her, and that, in the absence of a face-to-face physical evaluation by the physician, the diagnosis she receives is both limited and provisional in terms of accuracy and completeness  This is not intended to replace a full medical face-to-face evaluation by the physician  Hina Barnett understands and accepts these terms

## 2021-04-30 NOTE — TELEPHONE ENCOUNTER
Patient calling last night and today she is very sob  She stated her medications aren't helping  She stated even right after she uses her inhaler she is still sob  She stated she also has a pain in her chest right under where her bra would sit   Please advise [phone]

## 2021-05-03 ENCOUNTER — APPOINTMENT (OUTPATIENT)
Dept: RADIOLOGY | Facility: CLINIC | Age: 71
End: 2021-05-03
Payer: COMMERCIAL

## 2021-05-03 DIAGNOSIS — J45.31 MILD PERSISTENT ASTHMA WITH ACUTE BRONCHITIS AND ACUTE EXACERBATION: Primary | ICD-10-CM

## 2021-05-03 DIAGNOSIS — J20.9 MILD PERSISTENT ASTHMA WITH ACUTE BRONCHITIS AND ACUTE EXACERBATION: ICD-10-CM

## 2021-05-03 DIAGNOSIS — J45.31 MILD PERSISTENT ASTHMA WITH ACUTE BRONCHITIS AND ACUTE EXACERBATION: ICD-10-CM

## 2021-05-03 DIAGNOSIS — J20.9 MILD PERSISTENT ASTHMA WITH ACUTE BRONCHITIS AND ACUTE EXACERBATION: Primary | ICD-10-CM

## 2021-05-03 PROCEDURE — 71046 X-RAY EXAM CHEST 2 VIEWS: CPT

## 2021-05-03 NOTE — TELEPHONE ENCOUNTER
Spoke with patient  She said she has been using her nebulizer  Today when she used it, she had an issues swallowing her saliva while having the mouthpiece in her mouth  She said coughed and is now concerned that maybe she aspirated  She felt a little jittery after that happened  She said she then felt tried and fell asleep which is unlike her during the day  She has some discomfort on her left side  She has shallow breathing  She was clearing her throat on the phone  She hears a little wheeze

## 2021-05-03 NOTE — TELEPHONE ENCOUNTER
Stacie Zhong called, she used her nebulizer the other day and she felt like she had a buildup of phlegm which she felt like was a hard ball  She now is not feeling well, and is unsure of what to do

## 2021-05-10 ENCOUNTER — TELEPHONE (OUTPATIENT)
Dept: OTHER | Facility: HOSPITAL | Age: 71
End: 2021-05-10

## 2021-05-10 NOTE — TELEPHONE ENCOUNTER
Called and left message informing patient of CXR results  Instructed her to call the office back if she has questions  within normal limits

## 2021-05-17 ENCOUNTER — OFFICE VISIT (OUTPATIENT)
Dept: PULMONOLOGY | Facility: HOSPITAL | Age: 71
End: 2021-05-17
Payer: COMMERCIAL

## 2021-05-17 VITALS
TEMPERATURE: 98.7 F | WEIGHT: 107.8 LBS | DIASTOLIC BLOOD PRESSURE: 70 MMHG | HEART RATE: 80 BPM | HEIGHT: 61 IN | OXYGEN SATURATION: 98 % | SYSTOLIC BLOOD PRESSURE: 140 MMHG | RESPIRATION RATE: 16 BRPM | BODY MASS INDEX: 20.35 KG/M2

## 2021-05-17 DIAGNOSIS — J45.50 SEVERE PERSISTENT ASTHMA WITHOUT COMPLICATION: Primary | ICD-10-CM

## 2021-05-17 DIAGNOSIS — J45.30 MILD PERSISTENT ASTHMA, UNSPECIFIED WHETHER COMPLICATED: ICD-10-CM

## 2021-05-17 PROCEDURE — 99214 OFFICE O/P EST MOD 30 MIN: CPT | Performed by: INTERNAL MEDICINE

## 2021-05-17 RX ORDER — FLUTICASONE FUROATE AND VILANTEROL 200; 25 UG/1; UG/1
1 POWDER RESPIRATORY (INHALATION) DAILY
Qty: 1 EACH | Refills: 5 | Status: SHIPPED | OUTPATIENT
Start: 2021-05-17 | End: 2021-08-24

## 2021-05-17 RX ORDER — GUAIFENESIN 600 MG
600 TABLET, EXTENDED RELEASE 12 HR ORAL EVERY 12 HOURS SCHEDULED
COMMUNITY

## 2021-05-17 NOTE — ASSESSMENT & PLAN NOTE
Unfortunately I think Nasim Novoa is asthma symptoms have become uncontrolled  We talked at length today about possible reasons and I think that she had an asthma exacerbation after 2nd COVID vaccine as well as the aspiration of vomit for several days has set her off and she has not had good control since  She is audibly wheezing on today's exam is an exacerbation despite taking a course of antibiotics and a course of prednisone  I would like to escalate her maintenance therapy to Breo 201 puff daily and she can continue to use her rescue inhalers as needed  We talked about her asthma panel and how her allergies have been negative in the past   I do think if we have poor control that we should go down different path including CT scan of the chest and 2D echocardiogram   Will see her back in 6 weeks and make sure that she has response to escalation of her treatment

## 2021-05-17 NOTE — PROGRESS NOTES
Assessment/Plan:    Mild persistent asthma    Unfortunately I think Levi Alejo is asthma symptoms have become uncontrolled  We talked at length today about possible reasons and I think that she had an asthma exacerbation after 2nd COVID vaccine as well as the aspiration of vomit for several days has set her off and she has not had good control since  She is audibly wheezing on today's exam is an exacerbation despite taking a course of antibiotics and a course of prednisone  I would like to escalate her maintenance therapy to Breo 201 puff daily and she can continue to use her rescue inhalers as needed  We talked about her asthma panel and how her allergies have been negative in the past   I do think if we have poor control that we should go down different path including CT scan of the chest and 2D echocardiogram   Will see her back in 6 weeks and make sure that she has response to escalation of her treatment  Diagnoses and all orders for this visit:    Severe persistent asthma without complication  -     fluticasone-vilanterol (BREO ELLIPTA) 200-25 MCG/INH inhaler; Inhale 1 puff daily Rinse mouth after use  Other orders  -     guaiFENesin (MUCINEX) 600 mg 12 hr tablet; Take 600 mg by mouth every 12 (twelve) hours          Subjective:      Patient ID: Oni Paula is a 65 y o  female  Since he will has been struggling for the past 6 weeks with her asthma symptoms  She has taken a course of antibiotics and prednisone still has shortness of breath and fatigue  She has audible wheezing and cough  She notes this all started for after her 2nd coronavirus vaccine  At that time she was very nauseous and vomited for almost a day  She thinks she may have aspirated which cause some poor control of her asthma  She has been taking her nebulizer but it does not help     primary symptoms  Pertinent negatives include no chest pain, coughing, fever, headaches, myalgias or sore throat         The following portions of the patient's history were reviewed and updated as appropriate: allergies, current medications, past family history, past medical history, past social history, past surgical history and problem list     Review of Systems   Constitutional: Positive for appetite change  Negative for fever and unexpected weight change  HENT: Positive for postnasal drip  Negative for ear pain, rhinorrhea, sneezing, sore throat and trouble swallowing  Eyes: Negative  Respiratory: Positive for shortness of breath and wheezing  Negative for cough  Cardiovascular: Negative  Negative for chest pain and leg swelling  Gastrointestinal: Negative  Endocrine: Negative  Genitourinary: Negative  Musculoskeletal: Negative  Negative for myalgias  Allergic/Immunologic: Negative  Neurological: Negative  Negative for headaches  Hematological: Negative  Objective:      /70   Pulse 80   Temp 98 7 °F (37 1 °C) (Tympanic)   Resp 16   Ht 5' 0 5" (1 537 m)   Wt 48 9 kg (107 lb 12 8 oz)   SpO2 98%   BMI 20 71 kg/m²          Physical Exam  Constitutional:       Appearance: She is well-developed  HENT:      Head: Normocephalic  Eyes:      Pupils: Pupils are equal, round, and reactive to light  Neck:      Musculoskeletal: Normal range of motion and neck supple  Cardiovascular:      Rate and Rhythm: Normal rate  Heart sounds: No murmur  Pulmonary:      Effort: Pulmonary effort is normal  No respiratory distress  Breath sounds: Wheezing present  No rales  Abdominal:      Palpations: Abdomen is soft  Musculoskeletal: Normal range of motion  Skin:     General: Skin is warm and dry  Neurological:      Mental Status: She is alert and oriented to person, place, and time           Answers for HPI/ROS submitted by the patient on 5/14/2021   Primary symptoms  Do you experience frequent throat clearing?: Yes  Do you have a hoarse voice?: Yes  Chronicity: recurrent  When did you first notice your symptoms?: more than 1 month ago  How often do your symptoms occur?: constantly  Since you first noticed this problem, how has it changed?: unchanged  Do you have shortness of breath that occurs with effort or exertion?: Yes  Do you have ear congestion?: Yes  Do you have heartburn?: No  Do you have fatigue?: Yes  Do you have nasal congestion?: Yes  Do you have shortness of breath when lying flat?: No  Do you have shortness of breath when you wake up?: No  Do you have sweats?: No  Have you experienced weight loss?: Yes  Which of the following makes your symptoms worse?: change in weather, eating, exercise, exposure to fumes, exposure to smoke, strenuous activity  Which of the following makes your symptoms better?: nothing

## 2021-05-18 ENCOUNTER — TELEPHONE (OUTPATIENT)
Dept: OTHER | Facility: OTHER | Age: 71
End: 2021-05-18

## 2021-05-18 ENCOUNTER — TELEPHONE (OUTPATIENT)
Dept: PULMONOLOGY | Facility: CLINIC | Age: 71
End: 2021-05-18

## 2021-05-18 RX ORDER — ALBUTEROL SULFATE 90 UG/1
2 AEROSOL, METERED RESPIRATORY (INHALATION) EVERY 4 HOURS PRN
Qty: 18 G | Refills: 5 | Status: SHIPPED | OUTPATIENT
Start: 2021-05-18 | End: 2022-07-05 | Stop reason: SDUPTHER

## 2021-05-18 NOTE — TELEPHONE ENCOUNTER
Patient called with upper abdomen pain, more like her esophagus  She said this has been getting worse ever since she started her new medication for her asthma and everything she tried is not working  Told patient to call back in 30-40 mins if on call provider does not return her call

## 2021-05-18 NOTE — TELEPHONE ENCOUNTER
Petty Millard  to Alex Wu, DO          5/18/21 11:54 AM  Dr Sandy Amaya, One issue that I wanted to ask you about yesterday was about tingling or numbness that I have in my feet and have been wondering if it could be related to my Albuterol inhaler since I recently read that it could be a side effect  My neurologist said that it is not neuropathy  I took Breo 200/25mcg Ellipta at 7:56p m  last night  Within one hour, I had stomach discomfort/upset  This morning the stomach upset has continued  I have increased foot numbness which has moved up to my ankles and calves  My tinnitus has increased and is constant,I am light headed and I feel jittery  My information sheet said to share with you about my eye issues if I am on Breo  Two of them are narrow angles and cataracts  I had a 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

## 2021-05-18 NOTE — TELEPHONE ENCOUNTER
[facility] is not contraindicated in glaucoma  The cataract risk is same as her Qvar  It is important that her breathing is better controlled before considering any intervention for her shoulder so she needs to continue to try and push through with Breo  It is unlikely that 1 puff of breo will result in all those side effects  If she could try taking it for 1 week    Make sure its no on empty stomach and take it at night before bed

## 2021-05-18 NOTE — TELEPHONE ENCOUNTER
Patient calls and reports Stomach upset and discomfort, numbness and tingling in feet ankles and calves  She read the breo box which states that those are all side effects, and she does not want to use that inhaler anymore  Please advise what she can use

## 2021-05-19 NOTE — PROGRESS NOTES
PT called complaining of abdominal pain which began yesterday after taking Breo  Discussed with patient, she says it is pain in her central abdomen, she called in earlier and stated that the "abdominal discomfort has turned in to abdominal pain " She has tried to put heat on the area and it is still painful  "This abdominal pain has happened a few years ago and took weeks to get better " Reviewed notes from clinic and we discussed that she has had GI issues previously, namely reflux and is currently on Pepcid as well as Nexium  Discussed that it is unlikely that Wiley Willis has caused these symptoms but she may need further workup from her GI physician/primary care regarding these symptoms  She can take another Pepcid and continue with the Saurabh Parsonsni as prescribed  Her asthma symptoms and upper airway post nasal drip symptoms have improved significantly  She will call her GI/primary care physician tomorrow  Explained that if her abdominal pain became more severe or if she had other severe features to consider being evaluated in the emergency room  All questions answered

## 2021-06-24 ENCOUNTER — OFFICE VISIT (OUTPATIENT)
Dept: PULMONOLOGY | Facility: HOSPITAL | Age: 71
End: 2021-06-24
Payer: COMMERCIAL

## 2021-06-24 VITALS
HEIGHT: 61 IN | RESPIRATION RATE: 16 BRPM | DIASTOLIC BLOOD PRESSURE: 78 MMHG | BODY MASS INDEX: 20.35 KG/M2 | OXYGEN SATURATION: 99 % | WEIGHT: 107.8 LBS | HEART RATE: 77 BPM | SYSTOLIC BLOOD PRESSURE: 120 MMHG

## 2021-06-24 DIAGNOSIS — J45.50 SEVERE PERSISTENT ASTHMA WITHOUT COMPLICATION: Primary | ICD-10-CM

## 2021-06-24 DIAGNOSIS — J45.31 MILD PERSISTENT ASTHMA WITH ACUTE BRONCHITIS AND ACUTE EXACERBATION: ICD-10-CM

## 2021-06-24 DIAGNOSIS — J20.9 MILD PERSISTENT ASTHMA WITH ACUTE BRONCHITIS AND ACUTE EXACERBATION: ICD-10-CM

## 2021-06-24 PROCEDURE — 99214 OFFICE O/P EST MOD 30 MIN: CPT | Performed by: INTERNAL MEDICINE

## 2021-06-24 NOTE — PATIENT INSTRUCTIONS
Continue Breo once a day  Get PFTs and CT chest  Continue Breo until August 15 unless breathing is stable for at least 2 to 4 more weeks without wheezing or shortness of breath

## 2021-06-24 NOTE — ASSESSMENT & PLAN NOTE
Shital Espinosa is uncomfortable continue Breo due to her side effects of sour taste however I do think to maintain adequate control she is to continue this for at least another 4-8 weeks  In the meantime given her lack of control at like to get full pulmonary function tests with bronchodilator response and a CT scan of the chest to rule out mycobacterium avium disease given her weight loss thin stature and recurrent symptoms

## 2021-06-24 NOTE — PROGRESS NOTES
Assessment/Plan:    Mild persistent asthma with acute bronchitis and acute exacerbation    Teetee Butter is uncomfortable continue Breo due to her side effects of sour taste however I do think to maintain adequate control she is to continue this for at least another 4-8 weeks  In the meantime given her lack of control at like to get full pulmonary function tests with bronchodilator response and a CT scan of the chest to rule out mycobacterium avium disease given her weight loss thin stature and recurrent symptoms  Diagnoses and all orders for this visit:    Severe persistent asthma without complication  -     Complete PFT with post bronchodilator; Future  -     CT chest without contrast; Future    Mild persistent asthma with acute bronchitis and acute exacerbation    Other orders  -     fluticasone-vilanterol (Breo Ellipta) 200-25 MCG/INH inhaler; every 24 hours  -     esomeprazole (NexIUM) 20 mg capsule; every 24 hours  -     Alum Hydroxide-Mag Carbonate (GAVISCON EXTRA RELIEF FORMULA PO)          Subjective:      Patient ID: Dexter Bamberger is a 66 y o  female  Teetee Butter is slightly better since last  Month  She is having a sour taste in her mouth due to [facility] but she feels her wheezing is improved and her shortness of breath is improved  She still quite sensitive to inhaled irritants and her breathing is not at baseline  primary symptoms  Pertinent negatives include no chest pain, coughing, fever, headaches or sore throat  The following portions of the patient's history were reviewed and updated as appropriate: allergies, current medications, past family history, past medical history, past social history, past surgical history and problem list     Review of Systems   Constitutional: Positive for appetite change  Negative for fever and unexpected weight change  HENT: Positive for postnasal drip  Negative for ear pain, rhinorrhea, sneezing, sore throat and trouble swallowing  Eyes: Negative  Respiratory: Positive for shortness of breath and wheezing  Negative for cough  Cardiovascular: Negative  Negative for chest pain and leg swelling  Gastrointestinal: Negative  Endocrine: Negative  Genitourinary: Negative  Musculoskeletal: Negative  Allergic/Immunologic: Negative  Neurological: Negative  Negative for headaches  Hematological: Negative  Objective:      /78 (BP Location: Left arm, Patient Position: Sitting, Cuff Size: Standard)   Pulse 77   Resp 16   Ht 5' 0 5" (1 537 m)   Wt 48 9 kg (107 lb 12 8 oz)   SpO2 99%   BMI 20 71 kg/m²          Physical Exam  Constitutional:       Appearance: She is well-developed  HENT:      Head: Normocephalic  Eyes:      Pupils: Pupils are equal, round, and reactive to light  Cardiovascular:      Rate and Rhythm: Normal rate  Heart sounds: No murmur heard  Pulmonary:      Effort: Pulmonary effort is normal  No respiratory distress  Breath sounds: Normal breath sounds  No wheezing or rales  Abdominal:      Palpations: Abdomen is soft  Musculoskeletal:         General: Normal range of motion  Cervical back: Normal range of motion and neck supple  Skin:     General: Skin is warm and dry  Neurological:      Mental Status: She is alert and oriented to person, place, and time           Answers for HPI/ROS submitted by the patient on 6/20/2021  Do you experience frequent throat clearing?: Yes  Do you have a hoarse voice?: Yes  When did you first notice your symptoms?: more than 1 month ago  How often do your symptoms occur?: daily  Since you first noticed this problem, how has it changed?: unchanged  Do you have shortness of breath that occurs with effort or exertion?: Yes  Do you have ear congestion?: Yes  Do you have heartburn?: Yes  Do you have fatigue?: Yes  Do you have nasal congestion?: Yes  Do you have shortness of breath when lying flat?: No  Do you have shortness of breath when you wake up?: Yes  Do you have sweats?: No  Have you experienced weight loss?: Yes  Which of the following makes your symptoms worse?: change in weather, eating, exercise, exposure to fumes, exposure to smoke, strenuous activity  Which of the following makes your symptoms better?: OTC cough suppressant, rest

## 2021-07-01 ENCOUNTER — HOSPITAL ENCOUNTER (OUTPATIENT)
Dept: CT IMAGING | Facility: HOSPITAL | Age: 71
Discharge: HOME | End: 2021-07-01
Attending: INTERNAL MEDICINE
Payer: COMMERCIAL

## 2021-07-01 DIAGNOSIS — J45.50 SEVERE PERSISTENT ASTHMA WITHOUT COMPLICATION: ICD-10-CM

## 2021-07-01 PROCEDURE — 71250 CT THORAX DX C-: CPT

## 2021-07-01 PROCEDURE — G1004 CDSM NDSC: HCPCS

## 2021-08-04 ENCOUNTER — HOSPITAL ENCOUNTER (OUTPATIENT)
Dept: PULMONOLOGY | Facility: HOSPITAL | Age: 71
Discharge: HOME | End: 2021-08-04
Attending: INTERNAL MEDICINE
Payer: COMMERCIAL

## 2021-08-04 DIAGNOSIS — J45.50 SEVERE PERSISTENT ASTHMA WITHOUT COMPLICATION: ICD-10-CM

## 2021-08-04 PROCEDURE — 94726 PLETHYSMOGRAPHY LUNG VOLUMES: CPT

## 2021-08-04 PROCEDURE — 94060 EVALUATION OF WHEEZING: CPT | Performed by: INTERNAL MEDICINE

## 2021-08-04 PROCEDURE — 94060 EVALUATION OF WHEEZING: CPT

## 2021-08-04 PROCEDURE — 94726 PLETHYSMOGRAPHY LUNG VOLUMES: CPT | Performed by: INTERNAL MEDICINE

## 2021-08-04 PROCEDURE — 94760 N-INVAS EAR/PLS OXIMETRY 1: CPT

## 2021-08-04 PROCEDURE — 94729 DIFFUSING CAPACITY: CPT

## 2021-08-04 PROCEDURE — 94729 DIFFUSING CAPACITY: CPT | Performed by: INTERNAL MEDICINE

## 2021-08-04 RX ORDER — ALBUTEROL SULFATE 2.5 MG/3ML
2.5 SOLUTION RESPIRATORY (INHALATION) EVERY 6 HOURS PRN
Status: DISCONTINUED | OUTPATIENT
Start: 2021-08-04 | End: 2021-08-08 | Stop reason: HOSPADM

## 2021-08-04 RX ADMIN — ALBUTEROL SULFATE 2.5 MG: 2.5 SOLUTION RESPIRATORY (INHALATION) at 08:40

## 2021-08-24 ENCOUNTER — OFFICE VISIT (OUTPATIENT)
Dept: PULMONOLOGY | Facility: CLINIC | Age: 71
End: 2021-08-24
Payer: COMMERCIAL

## 2021-08-24 VITALS
OXYGEN SATURATION: 99 % | BODY MASS INDEX: 21.01 KG/M2 | TEMPERATURE: 98 F | HEART RATE: 74 BPM | HEIGHT: 60 IN | WEIGHT: 107 LBS | DIASTOLIC BLOOD PRESSURE: 80 MMHG | SYSTOLIC BLOOD PRESSURE: 140 MMHG

## 2021-08-24 DIAGNOSIS — J45.50 SEVERE PERSISTENT ASTHMA WITHOUT COMPLICATION: Primary | ICD-10-CM

## 2021-08-24 PROCEDURE — 99214 OFFICE O/P EST MOD 30 MIN: CPT | Performed by: INTERNAL MEDICINE

## 2021-08-24 RX ORDER — FLUTICASONE FUROATE AND VILANTEROL TRIFENATATE 100; 25 UG/1; UG/1
1 POWDER RESPIRATORY (INHALATION) DAILY
Qty: 60 BLISTER | Refills: 3 | Status: SHIPPED | OUTPATIENT
Start: 2021-08-24 | End: 2021-09-07

## 2021-08-24 RX ORDER — FLUTICASONE PROPIONATE 50 MCG
1 SPRAY, SUSPENSION (ML) NASAL DAILY
COMMUNITY

## 2021-08-24 NOTE — PROGRESS NOTES
Assessment/Plan:    Severe persistent asthma without complication    Laina Mueller is finally doing better and has stability of her asthma symptoms and congestion  Given her stability and the increased side effect profile of high-dose inhaled corticosteroids, we have decided to deescalate her to low-dose combination therapy with inhaled corticosteroids and long-acting bronchodilator  I have reviewed prescribed her Breo 100 to take 1 puff daily and will assess her response to treatment in 3 months  She will let me know if she has any change in her breathing  We discussed the coronavirus booster on today's visit  I was able to review her CT scan which showed no evidence of bronchiectasis or mycobacterium infection  I have also reviewed her PFTs with bronchodilator which seemed to show normal results  Diagnoses and all orders for this visit:    Severe persistent asthma without complication  -     fluticasone-vilanterol (Breo Ellipta) 100-25 mcg/inh inhaler; Inhale 1 puff daily Rinse mouth after use  Other orders  -     fluticasone (FLONASE) 50 mcg/act nasal spray; 1 spray into each nostril daily  -     Multiple Vitamins-Minerals (PRESERVISION AREDS 2 PO); Take by mouth          Subjective:      Patient ID: Josue Hernandez is a 66 y o  female  Laina Mueller feels much better with regards to her chronic breathing difficulties  She feels she is breathing better than she has for years  Her congestion in her chest and nose have also improved  She is using the Breo once daily and not needing her rescue inhaler  primary symptoms  Pertinent negatives include no chest pain, coughing, fever, headaches, myalgias or sore throat  The following portions of the patient's history were reviewed and updated as appropriate: allergies, current medications, past family history, past medical history, past social history, past surgical history and problem list     Review of Systems   Constitutional: Negative    Negative for appetite change, fever and unexpected weight change  HENT: Positive for postnasal drip  Negative for ear pain, rhinorrhea, sneezing, sore throat and trouble swallowing  Eyes: Negative  Respiratory: Negative  Negative for cough, shortness of breath and wheezing  Cardiovascular: Negative  Negative for chest pain and leg swelling  Gastrointestinal: Negative  Endocrine: Negative  Genitourinary: Negative  Musculoskeletal: Negative  Negative for myalgias  Allergic/Immunologic: Negative  Neurological: Negative  Negative for headaches  Hematological: Negative  Objective:      /80 (BP Location: Left arm, Patient Position: Sitting)   Pulse 74   Temp 98 °F (36 7 °C)   Ht 5' 0 05" (1 525 m)   Wt 48 5 kg (107 lb)   SpO2 99%   BMI 20 86 kg/m²          Physical Exam  Constitutional:       Appearance: She is well-developed  HENT:      Head: Normocephalic  Eyes:      Pupils: Pupils are equal, round, and reactive to light  Cardiovascular:      Rate and Rhythm: Normal rate  Heart sounds: No murmur heard  Pulmonary:      Effort: Pulmonary effort is normal  No respiratory distress  Breath sounds: Normal breath sounds  No wheezing or rales  Abdominal:      Palpations: Abdomen is soft  Musculoskeletal:         General: Normal range of motion  Cervical back: Normal range of motion and neck supple  Skin:     General: Skin is warm and dry  Neurological:      Mental Status: She is alert and oriented to person, place, and time           Answers for HPI/ROS submitted by the patient on 8/18/2021  When did you first notice your symptoms?: more than 1 month ago  How often do your symptoms occur?: intermittently  Since you first noticed this problem, how has it changed?: gradually improving  Do you have shortness of breath that occurs with effort or exertion?: No  Do you have ear congestion?: Yes  Do you have heartburn?: No  Do you have fatigue?: No  Do you have nasal congestion?: Yes  Do you have shortness of breath when lying flat?: No  Do you have shortness of breath when you wake up?: No  Do you have sweats?: No  Have you experienced weight loss?: No  Which of the following makes your symptoms worse?: change in weather, eating, exposure to fumes, exposure to smoke  Which of the following makes your symptoms better?: rest, steroid inhaler

## 2021-08-24 NOTE — ASSESSMENT & PLAN NOTE
Alireza Carpenter is finally doing better and has stability of her asthma symptoms and congestion  Given her stability and the increased side effect profile of high-dose inhaled corticosteroids, we have decided to deescalate her to low-dose combination therapy with inhaled corticosteroids and long-acting bronchodilator  I have reviewed prescribed her Breo 100 to take 1 puff daily and will assess her response to treatment in 3 months  She will let me know if she has any change in her breathing  We discussed the coronavirus booster on today's visit  I was able to review her CT scan which showed no evidence of bronchiectasis or mycobacterium infection  I have also reviewed her PFTs with bronchodilator which seemed to show normal results

## 2021-09-07 DIAGNOSIS — J45.30 MILD PERSISTENT ASTHMA WITHOUT COMPLICATION: Primary | ICD-10-CM

## 2021-09-10 ENCOUNTER — APPOINTMENT (OUTPATIENT)
Dept: RADIOLOGY | Facility: CLINIC | Age: 71
End: 2021-09-10
Payer: COMMERCIAL

## 2021-09-10 ENCOUNTER — OFFICE VISIT (OUTPATIENT)
Dept: URGENT CARE | Facility: CLINIC | Age: 71
End: 2021-09-10
Payer: COMMERCIAL

## 2021-09-10 VITALS
OXYGEN SATURATION: 100 % | RESPIRATION RATE: 16 BRPM | HEIGHT: 60 IN | DIASTOLIC BLOOD PRESSURE: 70 MMHG | BODY MASS INDEX: 21.01 KG/M2 | WEIGHT: 107 LBS | TEMPERATURE: 97.9 F | HEART RATE: 76 BPM | SYSTOLIC BLOOD PRESSURE: 128 MMHG

## 2021-09-10 DIAGNOSIS — S90.111A CONTUSION OF RIGHT GREAT TOE WITHOUT DAMAGE TO NAIL, INITIAL ENCOUNTER: Primary | ICD-10-CM

## 2021-09-10 DIAGNOSIS — S99.921A INJURY OF RIGHT GREAT TOE, INITIAL ENCOUNTER: ICD-10-CM

## 2021-09-10 PROCEDURE — 73660 X-RAY EXAM OF TOE(S): CPT

## 2021-09-10 PROCEDURE — 99213 OFFICE O/P EST LOW 20 MIN: CPT | Performed by: PHYSICIAN ASSISTANT

## 2021-09-10 NOTE — PROGRESS NOTES
[id number]        NAME: Munira Huffman is a 66 y o  female  : 1950    MRN: [id number]  DATE: September 10, 2021  TIME: 1:46 PM    Assessment and Plan   Contusion of right great toe without damage to nail, initial encounter [S90 111A]  1  Contusion of right great toe without damage to nail, initial encounter  XR toe right great min 2 view         Patient Instructions      discussed condition with patient  X-ray of the right great toe is negative for fracture dislocation  She has sustained contusion versus sprain  I applied buddy taping with Coban and recommended ice, supportive and protective shoes, discussed pain control  Should be re-evaluated if condition does not significantly improve over the next 1-2 weeks  Follow up with PCP in 3-5 days  Proceed to  ER if symptoms worsen  Chief Complaint     Chief Complaint   Patient presents with    Toe Injury     right great toe pain  yesterday she injured her right great toe         History of Present Illness         Patient presents 1 day after sustaining injury to her right great toe  She reports that she was trying to get something off the bottom of her shoe and so she hit her foot against a hard surface to try to do so which is how she sustained the injury to the toe  She reports pain distally worsened with walking and weight-bearing  Denies any injury to the nail  Review of Systems   Review of Systems   Constitutional: Negative  Respiratory: Negative  Cardiovascular: Negative  Gastrointestinal: Negative  Genitourinary: Negative  Musculoskeletal:        Right great toe pain status post injury   Neurological: Negative            Current Medications       Current Outpatient Medications:     albuterol (PROVENTIL HFA,VENTOLIN HFA) 90 mcg/act inhaler, Inhale 2 puffs every 4 (four) hours as needed for shortness of breath, Disp: 18 g, Rfl: 5    Alum Hydroxide-Mag Carbonate (GAVISCON EXTRA RELIEF FORMULA PO), , Disp: , Rfl:     Cholecalciferol 50 MCG (2000 UT) CAPS, Take 1 tablet by mouth daily, Disp: , Rfl:     esomeprazole (NexIUM) 20 mg capsule, Take 20 mg by mouth daily at bedtime, Disp: , Rfl:     famotidine (PEPCID) 10 mg tablet, Take 10 mg by mouth daily, Disp: , Rfl:     fluticasone (FLONASE) 50 mcg/act nasal spray, 1 spray into each nostril daily, Disp: , Rfl:     fluticasone-vilanterol (Breo Ellipta) 200-25 MCG/INH inhaler, Inhale 1 puff daily, Disp: 60 blister, Rfl: 2    guaiFENesin (MUCINEX) 600 mg 12 hr tablet, Take 600 mg by mouth every 12 (twelve) hours, Disp: , Rfl:     Influenza Virus Vacc Split PF 0 5 ML YURIDIA, inject 0 5 milliliter intramuscularly, Disp: , Rfl:     levothyroxine (Synthroid) 50 mcg tablet, Take 50 mcg by mouth daily in the early morning, Disp: , Rfl:     metroNIDAZOLE (Metrogel) 1 % gel, Apply 1 application topically daily, Disp: , Rfl:     Multiple Vitamins-Minerals (PRESERVISION AREDS 2 PO), Take by mouth, Disp: , Rfl:     Peak Flow Meter NANDA, 1 Act by Does not apply route 2 (two) times a day, Disp: , Rfl:     polyethylene glycol-propylene glycol (SYSTANE) 0 4-0 3 %, Apply 1 drop to eye Three times a day, Disp: , Rfl:     simvastatin (ZOCOR) 10 mg tablet, Take 10 mg by mouth, Disp: , Rfl:     albuterol (2 5 mg/3 mL) 0 083 % nebulizer solution, Take 1 vial (2 5 mg total) by nebulization every 6 (six) hours as needed for wheezing or shortness of breath (Patient not taking: Reported on 6/24/2021), Disp: 120 vial, Rfl: 6    Albuterol Sulfate (albuterol, FOR EMS ONLY,) (2 5 mg/3 mL) 0 083 % nebulizer solution, Take 2 5 mg by nebulization every 6 (six) hours as needed for wheezing, Disp: , Rfl:     beclomethasone (Qvar RediHaler) 40 MCG/ACT inhaler, Inhale 2 puffs 2 (two) times a day (Patient not taking: Reported on 8/24/2021), Disp: 1 Inhaler, Rfl: 5    conjugated estrogens (PREMARIN) vaginal cream, Insert 1 g into the vagina once a week, Disp: , Rfl:     esomeprazole (NexIUM) 20 mg capsule, every 24 hours (Patient not taking: Reported on 8/24/2021), Disp: , Rfl:     fexofenadine (ALLEGRA) 180 MG tablet, Take 180 mg by mouth daily, Disp: , Rfl:     fluticasone (VERAMYST) 27 5 MCG/SPRAY nasal spray, 1 spray into each nostril daily (Patient not taking: Reported on 8/24/2021), Disp: , Rfl:     predniSONE 20 mg tablet, Take 2 tablets (40 mg total) by mouth daily, Disp: 10 tablet, Rfl: 0    Current Allergies     Allergies as of 09/10/2021 - Reviewed 09/10/2021   Allergen Reaction Noted    Amoxicillin Abdominal Pain, GI Intolerance, and Nausea Only 12/30/2020    Clotrimazole Vomiting 10/03/2019    Cyclobenzaprine Nausea Only 05/24/2021    Naproxen Nausea Only 07/15/2019    Other  02/01/2018    Tramadol Nausea Only 07/15/2019            The following portions of the patient's history were reviewed and updated as appropriate: allergies, current medications, past family history, past medical history, past social history, past surgical history and problem list      Past Medical History:   Diagnosis Date    Asthma     Disease of thyroid gland     GERD (gastroesophageal reflux disease)     Hyperlipidemia        Past Surgical History:   Procedure Laterality Date    COLONOSCOPY      EYE SURGERY Bilateral     preventative glaucoma surgery    THYROID SURGERY         History reviewed  No pertinent family history  Medications have been verified  Objective   /70   Pulse 76   Temp 97 9 °F (36 6 °C)   Resp 16   Ht 5' (1 524 m)   Wt 48 5 kg (107 lb)   SpO2 100%   BMI 20 90 kg/m²   No LMP recorded  Physical Exam     Physical Exam  Vitals reviewed  Constitutional:       General: She is not in acute distress  Appearance: She is well-developed  Musculoskeletal:      Comments: Distal right great toe with mild tenderness to palpation worsened with passive range of motion which is overall intact  There is no ecchymosis, significant swelling, or deformity noted  Remainder of right foot exam is benign  No visible damage to the nail  Neurological:      Mental Status: She is alert and oriented to person, place, and time  Sensory: No sensory deficit

## 2021-09-10 NOTE — PATIENT INSTRUCTIONS
Foot Contusion   WHAT YOU NEED TO KNOW:   A foot contusion is a bruise to the foot  DISCHARGE INSTRUCTIONS:   Medicines:   · NSAIDs:  These medicines decrease swelling and pain  NSAIDs are available without a doctor's order  Ask your healthcare provider which medicine is right for you  Ask how much to take and when to take it  Take as directed  NSAIDs can cause stomach bleeding and kidney problems if not taken correctly  · Take your medicine as directed  Contact your healthcare provider if you think your medicine is not helping or if you have side effects  Tell him of her if you are allergic to any medicine  Keep a list of the medicines, vitamins, and herbs you take  Include the amounts, and when and why you take them  Bring the list or the pill bottles to follow-up visits  Carry your medicine list with you in case of an emergency  Follow up with your healthcare provider as directed:  Write down your questions so you remember to ask them during your visits  Care for your foot: Follow your treatment plan to help decrease your pain and improve your muscle movement  · Rest:  You will need to rest your foot for 1 to 2 days after your injury  This will help decrease the risk of more damage  · Ice:  Ice helps decrease swelling and pain  Ice may also help prevent tissue damage  Use an ice pack, or put crushed ice in a plastic bag  Cover it with a towel and place it on your foot for 15 to 20 minutes every hour or as directed  · Compression:  Compression (tight hold) provides support and helps decrease swelling and movement so your foot can heal  You may be told to keep your foot wrapped with a tight elastic bandage  Follow instructions about how to apply your bandage  Do not massage your foot  You could cause more damage or pain  · Elevation:  Keep your foot raised above the level of your heart while you are sitting or lying down  This will help decrease or limit swelling   Use pillows, blankets, or rolled towels to elevate your foot comfortably  Exercise your foot:  You may be given gentle exercises to improve your foot movement and help decrease stiffness  Ask when you can return to your normal activities or sports  Prevent another injury:   · Wear equipment to protect yourself when you play sports  · Make sure your shoes fit properly  · Always wear shoes on streets or sidewalks  · Clean spills off the floor right away to avoid slipping or hitting your foot  · Make sure your home is well lit when you get up during the night  This will help you avoid hurting your foot in the dark  Contact your healthcare provider if:   · You have increased swelling on your foot  · You have severe foot pain  · You are not able to move your foot  · You have questions or concerns about your injury or treatment  © Copyright [employer] 2021 Information is for End User's use only and may not be sold, redistributed or otherwise used for commercial purposes  All illustrations and images included in CareNotes® are the copyrighted property of A D A M , Inc  or [facility]   The above information is an  only  It is not intended as medical advice for individual conditions or treatments  Talk to your doctor, nurse or pharmacist before following any medical regimen to see if it is safe and effective for you

## 2021-11-19 ENCOUNTER — APPOINTMENT (OUTPATIENT)
Dept: LAB | Facility: HOSPITAL | Age: 71
End: 2021-11-19
Attending: INTERNAL MEDICINE
Payer: COMMERCIAL

## 2021-11-19 ENCOUNTER — OFFICE VISIT (OUTPATIENT)
Dept: PULMONOLOGY | Facility: HOSPITAL | Age: 71
End: 2021-11-19
Payer: COMMERCIAL

## 2021-11-19 VITALS
HEART RATE: 79 BPM | TEMPERATURE: 98.1 F | OXYGEN SATURATION: 99 % | RESPIRATION RATE: 18 BRPM | DIASTOLIC BLOOD PRESSURE: 60 MMHG | HEIGHT: 60 IN | WEIGHT: 106.5 LBS | BODY MASS INDEX: 20.91 KG/M2 | SYSTOLIC BLOOD PRESSURE: 124 MMHG

## 2021-11-19 DIAGNOSIS — J45.50 SEVERE PERSISTENT ASTHMA WITHOUT COMPLICATION: Primary | ICD-10-CM

## 2021-11-19 DIAGNOSIS — J45.50 SEVERE PERSISTENT ASTHMA WITHOUT COMPLICATION: ICD-10-CM

## 2021-11-19 LAB
BASOPHILS # BLD AUTO: 0.05 THOUSANDS/ΜL (ref 0–0.1)
BASOPHILS NFR BLD AUTO: 1 % (ref 0–1)
EOSINOPHIL # BLD AUTO: 0.09 THOUSAND/ΜL (ref 0–0.61)
EOSINOPHIL NFR BLD AUTO: 1 % (ref 0–6)
ERYTHROCYTE [DISTWIDTH] IN BLOOD BY AUTOMATED COUNT: 13 % (ref 11.6–15.1)
HCT VFR BLD AUTO: 41 % (ref 34.8–46.1)
HGB BLD-MCNC: 13.5 G/DL (ref 11.5–15.4)
IMM GRANULOCYTES # BLD AUTO: 0.04 THOUSAND/UL (ref 0–0.2)
IMM GRANULOCYTES NFR BLD AUTO: 1 % (ref 0–2)
LYMPHOCYTES # BLD AUTO: 1.83 THOUSANDS/ΜL (ref 0.6–4.47)
LYMPHOCYTES NFR BLD AUTO: 21 % (ref 14–44)
MCH RBC QN AUTO: 30.4 PG (ref 26.8–34.3)
MCHC RBC AUTO-ENTMCNC: 32.9 G/DL (ref 31.4–37.4)
MCV RBC AUTO: 92 FL (ref 82–98)
MONOCYTES # BLD AUTO: 0.8 THOUSAND/ΜL (ref 0.17–1.22)
MONOCYTES NFR BLD AUTO: 9 % (ref 4–12)
NEUTROPHILS # BLD AUTO: 5.89 THOUSANDS/ΜL (ref 1.85–7.62)
NEUTS SEG NFR BLD AUTO: 67 % (ref 43–75)
NRBC BLD AUTO-RTO: 0 /100 WBCS
PLATELET # BLD AUTO: 224 THOUSANDS/UL (ref 149–390)
PMV BLD AUTO: 9.4 FL (ref 8.9–12.7)
RBC # BLD AUTO: 4.44 MILLION/UL (ref 3.81–5.12)
WBC # BLD AUTO: 8.7 THOUSAND/UL (ref 4.31–10.16)

## 2021-11-19 PROCEDURE — 85025 COMPLETE CBC W/AUTO DIFF WBC: CPT

## 2021-11-19 PROCEDURE — 86003 ALLG SPEC IGE CRUDE XTRC EA: CPT

## 2021-11-19 PROCEDURE — 99214 OFFICE O/P EST MOD 30 MIN: CPT | Performed by: INTERNAL MEDICINE

## 2021-11-19 PROCEDURE — 36415 COLL VENOUS BLD VENIPUNCTURE: CPT

## 2021-11-19 PROCEDURE — 82785 ASSAY OF IGE: CPT

## 2021-11-19 RX ORDER — BUDESONIDE 0.5 MG/2ML
0.5 INHALANT ORAL 2 TIMES DAILY
Qty: 240 ML | Refills: 5 | Status: SHIPPED | OUTPATIENT
Start: 2021-11-19

## 2021-11-22 ENCOUNTER — TELEPHONE (OUTPATIENT)
Dept: PULMONOLOGY | Facility: CLINIC | Age: 71
End: 2021-11-22

## 2021-11-22 LAB
A ALTERNATA IGE QN: <0.1 KUA/I
A FUMIGATUS IGE QN: <0.1 KUA/I
ALLERGEN COMMENT: NORMAL
BERMUDA GRASS IGE QN: <0.1 KUA/I
BOXELDER IGE QN: <0.1 KUA/I
C HERBARUM IGE QN: <0.1 KUA/I
CAT DANDER IGE QN: <0.1 KUA/I
CMN PIGWEED IGE QN: <0.1 KUA/I
COMMON RAGWEED IGE QN: <0.1 KUA/I
COTTONWOOD IGE QN: <0.1 KUA/I
D FARINAE IGE QN: <0.1 KUA/I
D PTERONYSS IGE QN: <0.1 KUA/I
DOG DANDER IGE QN: <0.1 KUA/I
LONDON PLANE IGE QN: <0.1 KUA/I
MOUSE URINE PROT IGE QN: <0.1 KUA/I
MT JUNIPER IGE QN: <0.1 KUA/I
MUGWORT IGE QN: <0.1 KUA/I
P NOTATUM IGE QN: <0.1 KUA/I
ROACH IGE QN: <0.1 KUA/I
SHEEP SORREL IGE QN: <0.1 KUA/I
SILVER BIRCH IGE QN: <0.1 KUA/I
TIMOTHY IGE QN: <0.1 KUA/I
TOTAL IGE SMQN RAST: 44.8 KU/L (ref 0–113)
WALNUT IGE QN: <0.1 KUA/I
WHITE ASH IGE QN: <0.1 KUA/I
WHITE ELM IGE QN: <0.1 KUA/I
WHITE MULBERRY IGE QN: <0.1 KUA/I
WHITE OAK IGE QN: <0.1 KUA/I

## 2021-11-29 DIAGNOSIS — J45.30 MILD PERSISTENT ASTHMA WITHOUT COMPLICATION: ICD-10-CM

## 2022-02-08 ENCOUNTER — TELEPHONE (OUTPATIENT)
Dept: PULMONOLOGY | Facility: CLINIC | Age: 72
End: 2022-02-08

## 2022-02-21 ENCOUNTER — OFFICE VISIT (OUTPATIENT)
Dept: PULMONOLOGY | Facility: CLINIC | Age: 72
End: 2022-02-21
Payer: COMMERCIAL

## 2022-02-21 VITALS
HEIGHT: 62 IN | BODY MASS INDEX: 20.06 KG/M2 | OXYGEN SATURATION: 100 % | SYSTOLIC BLOOD PRESSURE: 128 MMHG | DIASTOLIC BLOOD PRESSURE: 70 MMHG | TEMPERATURE: 97.7 F | HEART RATE: 74 BPM | WEIGHT: 109 LBS

## 2022-02-21 DIAGNOSIS — J45.50 SEVERE PERSISTENT ASTHMA WITHOUT COMPLICATION: Primary | ICD-10-CM

## 2022-02-21 DIAGNOSIS — K21.00 GASTROESOPHAGEAL REFLUX DISEASE WITH ESOPHAGITIS WITHOUT HEMORRHAGE: ICD-10-CM

## 2022-02-21 PROCEDURE — 99213 OFFICE O/P EST LOW 20 MIN: CPT | Performed by: INTERNAL MEDICINE

## 2022-02-21 NOTE — PROGRESS NOTES
Pulmonary Follow Up Note   Lupe Azul 66 y o  female MRN: [id number]  2/21/2022      Assessment:    Severe persistent asthma  - History of asthma symptoms with frequent exacerbations  - On Breo 200/35  - Albuterol HFA PRN  - Not using previously prescribed budesonide nebs  - Spirometry from last year with normal ratio and FEV1 106%, normal FVL and no postbronchodilator response  - 2 recent exacerbations requiring prednisone  - Not currently in exacerbation  - Using albuterol once daily    Nasal congestion  - Waking up recently with mucus production  - Using flonase    GERD  - Recently underwent manometry testing and states that her respiratory and reflux symptoms were actually very well undercontroll with the manometer in place  Once it was removed she developed GERD and asthma symptoms again  - Seems this is the primary driving factor for the lack of control of her asthma  - On nexium and pepcid at night as well as OTC medications PRN  - Manometry results pending and to follow with GI      Plan:    - Follow up with GI regarding gastroesophageal symptoms and manometry results  - Given sample of saline nasal rinse to try at night time to alleviate morning mucus  - Continue Breo 200/25 daily and albuterol HFA PRN  - Follow up in about 3 months      Return in about 3 months (around 5/21/2022)  History of Present Illness   HPI:  Anastasia Severe is a 66 y o  female who presents in follow up for her asthma  She has had two recent exacerbations requiring prednisone  More recently has been taking albuterol just once a day  She states that she has been having some mucus production overnight and is waking up with a cough  The cough clears once she produces clear mucus  She also has been undergoing workup for her GERD  She recently had a manometry system in place for monitoring  She states that while the manometer was in, her GERD and asthma symptoms were completely under control   Once it was removed she had difficutly breathing again and severe reflux symptoms  She still wakes up with an acid taste in her mouth despite nexium and pepcid at night  Review of Systems   Constitutional: Negative for appetite change, chills and fever  HENT: Positive for postnasal drip  Negative for ear pain, rhinorrhea, sneezing, sore throat and trouble swallowing  Eyes: Negative for pain and visual disturbance  Respiratory: Positive for shortness of breath  Negative for cough  Cardiovascular: Negative for chest pain and palpitations  Gastrointestinal: Negative for abdominal pain and vomiting  Genitourinary: Negative for dysuria and hematuria  Musculoskeletal: Negative for arthralgias, back pain and myalgias  Skin: Negative for color change and rash  Neurological: Negative for seizures, syncope and headaches  All other systems reviewed and are negative  Historical Information   Past Medical History:   Diagnosis Date    Asthma     Disease of thyroid gland     GERD (gastroesophageal reflux disease)     Hyperlipidemia      Past Surgical History:   Procedure Laterality Date    COLONOSCOPY      EYE SURGERY Bilateral     preventative glaucoma surgery    THYROID SURGERY       History reviewed  No pertinent family history    Social History     Tobacco Use   Smoking Status Never Smoker   Smokeless Tobacco Never Used       Meds/Allergies     Current Outpatient Medications:     albuterol (2 5 mg/3 mL) 0 083 % nebulizer solution, Take 1 vial (2 5 mg total) by nebulization every 6 (six) hours as needed for wheezing or shortness of breath, Disp: 120 vial, Rfl: 6    albuterol (PROVENTIL HFA,VENTOLIN HFA) 90 mcg/act inhaler, Inhale 2 puffs every 4 (four) hours as needed for shortness of breath, Disp: 18 g, Rfl: 5    Alum Hydroxide-Mag Carbonate (GAVISCON EXTRA RELIEF FORMULA PO), , Disp: , Rfl:     budesonide (Pulmicort) 0 5 mg/2 mL nebulizer solution, Take 2 mL (0 5 mg total) by nebulization 2 (two) times a day Rinse mouth after use , Disp: 240 mL, Rfl: 5    Cholecalciferol 50 MCG (2000 UT) CAPS, Take 1 tablet by mouth daily, Disp: , Rfl:     esomeprazole (NexIUM) 20 mg capsule, Take 20 mg by mouth daily at bedtime, Disp: , Rfl:     esomeprazole (NexIUM) 20 mg capsule, every 24 hours  , Disp: , Rfl:     famotidine (PEPCID) 10 mg tablet, Take 10 mg by mouth daily, Disp: , Rfl:     fluticasone (FLONASE) 50 mcg/act nasal spray, 1 spray into each nostril daily, Disp: , Rfl:     fluticasone-vilanterol (Breo Ellipta) 200-25 MCG/INH inhaler, Inhale 1 puff daily, Disp: 60 blister, Rfl: 5    guaiFENesin (MUCINEX) 600 mg 12 hr tablet, Take 600 mg by mouth every 12 (twelve) hours, Disp: , Rfl:     Influenza Virus Vacc Split PF 0 5 ML YURIDIA, inject 0 5 milliliter intramuscularly, Disp: , Rfl:     levothyroxine (Synthroid) 50 mcg tablet, Take 50 mcg by mouth daily in the early morning, Disp: , Rfl:     metroNIDAZOLE (Metrogel) 1 % gel, Apply 1 application topically daily, Disp: , Rfl:     Multiple Vitamins-Minerals (PRESERVISION AREDS 2 PO), Take by mouth, Disp: , Rfl:     Peak Flow Meter NANDA, 1 Act by Does not apply route 2 (two) times a day, Disp: , Rfl:     polyethylene glycol-propylene glycol (SYSTANE) 0 4-0 3 %, Apply 1 drop to eye Three times a day, Disp: , Rfl:     simvastatin (ZOCOR) 10 mg tablet, Take 10 mg by mouth, Disp: , Rfl:     conjugated estrogens (PREMARIN) vaginal cream, Insert 1 g into the vagina once a week, Disp: , Rfl:     fluticasone (VERAMYST) 27 5 MCG/SPRAY nasal spray, 1 spray into each nostril daily (Patient not taking: Reported on 8/24/2021), Disp: , Rfl:     predniSONE 20 mg tablet, Take 2 tablets (40 mg total) by mouth daily, Disp: 10 tablet, Rfl: 0  Allergies   Allergen Reactions    Amoxicillin Abdominal Pain, GI Intolerance and Nausea Only    Clotrimazole Vomiting    Cyclobenzaprine Nausea Only    Naproxen Nausea Only    Other      Very sensitive to strong perfumes, sprays, smoke and exercise etc   Recently dx with asthma    Tramadol Nausea Only       Vitals: Blood pressure 128/70, pulse 74, temperature 97 7 °F (36 5 °C), height 5' 1 5" (1 562 m), weight 49 4 kg (109 lb), SpO2 100 %  Body mass index is 20 26 kg/m²  Oxygen Therapy  SpO2: 100 %  Oxygen Therapy: None (Room air)      Physical Exam  Physical Exam  Vitals and nursing note reviewed  Constitutional:       General: She is not in acute distress  Appearance: Normal appearance  She is well-developed  She is not ill-appearing  HENT:      Head: Normocephalic and atraumatic  Mouth/Throat:      Mouth: Mucous membranes are dry  Eyes:      General: No scleral icterus  Conjunctiva/sclera: Conjunctivae normal    Cardiovascular:      Rate and Rhythm: Normal rate and regular rhythm  Heart sounds: No murmur heard  No friction rub  No gallop  Pulmonary:      Effort: Pulmonary effort is normal  No respiratory distress  Breath sounds: Normal breath sounds  No wheezing, rhonchi or rales  Abdominal:      Palpations: Abdomen is soft  Tenderness: There is no abdominal tenderness  Musculoskeletal:      Cervical back: Neck supple  Right lower leg: No edema  Left lower leg: No edema  Skin:     General: Skin is warm and dry  Neurological:      Mental Status: She is alert  Labs: I have personally reviewed pertinent lab results    Lab Results   Component Value Date    WBC 8 70 11/19/2021    HGB 13 5 11/19/2021    HCT 41 0 11/19/2021    MCV 92 11/19/2021     11/19/2021     Lab Results   Component Value Date    CALCIUM 9 3 05/14/2020    K 3 5 05/14/2020    CO2 31 05/14/2020    CL 98 (L) 05/14/2020    BUN 14 05/14/2020    CREATININE 0 67 05/14/2020     Lab Results   Component Value Date    IGE 44 8 11/19/2021       Preventive   Influenza vaccine: States she obtained, unclear date  COVID-19 vaccination: Moderna x 3, last dose 9/11/21  Pneumonia vaccine: Pneumovax 23 5/20/14, Prevnar 13 6/21/16  Lung Cancer screening: Not indicated    Imaging and other studies: I have personally reviewed pertinent films in PACS   CT chest w/o 7/1/21:  No active cardiopulmonary disease  Thickened distal esophagus  Complete PFT with post bronchodilator    Result Date: 8/6/2021  Impression: 1  Normal spirometry, lung volumes and flow volume loop 2  Mild diffusion impairment 3  No bronchodilator response Portions of the record may have been created with voice recognition software  Occasional wrong word or "sound a like" substitutions may have occurred due to the inherent limitations of voice recognition software  Read the chart carefully and recognize, using context, where substitutions have occurred  Pulmonary function testing 6/24/21:  Spirometry:  FEV1/FVC Ratio is 81  FEV1 is 2 00L which is 106% predicted  FVC is 2 46L which is 101% predicted  There is no significant postbronchodilator improvement in FEV1 or FVC      Flow volume loop:  Normal     Lung volumes: Total lung capacity is 4 00L which is 88% predicted  Residual volume is 73% predicted      Diffusing capacity:  75% predicted        IMPRESSION:  1  Normal spirometry, lung volumes and flow volume loop  2  Mild diffusion impairment  3  No bronchodilator response    EKG, Pathology, and Other Studies: I have personally reviewed pertinent reports  Disclaimer: Portions of the record may have been created with voice recognition software  Occasional wrong word or "sound a like" substitutions may have occurred due to the inherent limitations of voice recognition software  Careful consideration should be taken to recognize, using context, where substitutions have occurred      Ramiro Naylor DO   Pulmonary & Critical Care Medicine Fellow PGY-IV  [facility]

## 2022-04-24 ENCOUNTER — NURSE TRIAGE (OUTPATIENT)
Dept: OTHER | Facility: OTHER | Age: 72
End: 2022-04-24

## 2022-04-24 NOTE — TELEPHONE ENCOUNTER
Regarding: fluticasone-vilanterol Joshua Gunkrystian) 200-25 MCG/INH inhaler -Question  ----- Message from Jerome Villanueva sent at 4/24/2022  4:15 PM EDT -----  "I have a question about my fluticasone-vilanterol (Breo Ellipta) 200-25 MCG/INH inhaler "

## 2022-05-19 ENCOUNTER — OFFICE VISIT (OUTPATIENT)
Dept: PULMONOLOGY | Facility: CLINIC | Age: 72
End: 2022-05-19
Payer: COMMERCIAL

## 2022-05-19 VITALS
HEIGHT: 60 IN | BODY MASS INDEX: 21.32 KG/M2 | DIASTOLIC BLOOD PRESSURE: 64 MMHG | RESPIRATION RATE: 18 BRPM | TEMPERATURE: 97.7 F | WEIGHT: 108.6 LBS | HEART RATE: 72 BPM | OXYGEN SATURATION: 100 % | SYSTOLIC BLOOD PRESSURE: 120 MMHG

## 2022-05-19 DIAGNOSIS — J45.50 SEVERE PERSISTENT ASTHMA WITHOUT COMPLICATION: Primary | ICD-10-CM

## 2022-05-19 PROCEDURE — 99215 OFFICE O/P EST HI 40 MIN: CPT | Performed by: INTERNAL MEDICINE

## 2022-05-19 RX ORDER — SUCRALFATE 1 G/1
1 TABLET ORAL 2 TIMES DAILY
COMMUNITY
Start: 2022-04-26

## 2022-05-19 NOTE — ASSESSMENT & PLAN NOTE
Homer Dickinson has finally gained good control of her asthma on her current inhaled therapy  She will maintain on Breo 201 puff daily and use her albuterol prior to exercise and p r n     I think her acid reflux and her chronic postnasal drip have been optimized to a point where her asthma is easier to control  I reviewed her peak flow diary and answered questions  She has of the rotator cuff surgery scheduled in the coming weeks and she certainly optimized as at low risk from a pulmonary standpoint for this procedure  I would clear her for surgery  She will come back and see me in 4 months time

## 2022-05-19 NOTE — LETTER
May 19, 2022     Marylou Duron MD  [id number] [address]    Patient: Zoran Jc   YOB: 1950   Date of Visit: 5/19/2022       Dear Dr Sweta Jauregui Recipients: Thank you for referring Zoran Jc to me for evaluation  Below are my notes for this consultation  If you have questions, please do not hesitate to call me  I look forward to following your patient along with you  Sincerely,        Nancy Cuevas DO        CC: No Recipients  Nancy Cuevas DO  5/19/2022 11:28 AM  Signed  Assessment/Plan:    Severe persistent asthma without complication  Matt Payne has finally gained good control of her asthma on her current inhaled therapy  She will maintain on Breo 201 puff daily and use her albuterol prior to exercise and p r n     I think her acid reflux and her chronic postnasal drip have been optimized to a point where her asthma is easier to control  I reviewed her peak flow diary and answered questions  She has of the rotator cuff surgery scheduled in the coming weeks and she certainly optimized as at low risk from a pulmonary standpoint for this procedure  I would clear her for surgery  She will come back and see me in 4 months time  Diagnoses and all orders for this visit:    Severe persistent asthma without complication    Other orders  -     sucralfate (CARAFATE) 1 g tablet; Take 1 g by mouth in the morning and 1 g in the evening  Subjective:      Patient ID: Zoran Jc is a 66 y o  female  Jamtosha René is here for follow-up of her asthma  She feels her breathing is doing quite well  She maintained on Breo 1 puff daily and use her rescue inhaler infrequently  She does use it 30 minutes prior to exercise as prophylaxis  Her current GI medications are working well and her postnasal drip is under good control  primary symptoms  Associated symptoms include myalgias   Pertinent negatives include no chest pain, coughing, headaches or sore throat  The following portions of the patient's history were reviewed and updated as appropriate: allergies, current medications, past family history, past medical history, past social history, past surgical history and problem list     Review of Systems   Constitutional: Negative  Negative for appetite change and unexpected weight change  HENT: Positive for postnasal drip  Negative for ear pain, rhinorrhea, sneezing, sore throat and trouble swallowing  Eyes: Negative  Respiratory: Negative  Negative for cough, shortness of breath and wheezing  Cardiovascular: Negative  Negative for chest pain and leg swelling  Gastrointestinal: Negative  Endocrine: Negative  Genitourinary: Negative  Musculoskeletal: Positive for myalgias  Allergic/Immunologic: Negative  Neurological: Negative  Negative for headaches  Hematological: Negative  Objective:      /64   Pulse 72   Temp 97 7 °F (36 5 °C)   Resp 18   Ht 5' (1 524 m)   Wt 49 3 kg (108 lb 9 6 oz)   SpO2 100%   BMI 21 21 kg/m²          Physical Exam  Constitutional:       Appearance: She is well-developed  HENT:      Head: Normocephalic  Eyes:      Pupils: Pupils are equal, round, and reactive to light  Cardiovascular:      Rate and Rhythm: Normal rate  Heart sounds: No murmur heard  Pulmonary:      Effort: Pulmonary effort is normal  No respiratory distress  Breath sounds: Normal breath sounds  No wheezing or rales  Abdominal:      Palpations: Abdomen is soft  Musculoskeletal:         General: Normal range of motion  Cervical back: Normal range of motion and neck supple  Skin:     General: Skin is warm and dry  Neurological:      Mental Status: She is alert and oriented to person, place, and time           Answers for HPI/ROS submitted by the patient on 5/17/2022  Do you experience frequent throat clearing?: Yes  Chronicity: recurrent  When did you first notice your symptoms?: more than 1 year ago  How often do your symptoms occur?: constantly  Since you first noticed this problem, how has it changed?: waxing and waning  Do you have shortness of breath that occurs with effort or exertion?: Yes  Do you have ear congestion?: No  Do you have heartburn?: No  Do you have fatigue?: No  Do you have nasal congestion?: No  Do you have shortness of breath when lying flat?: No  Do you have shortness of breath when you wake up?: No  Do you have sweats?: No  Have you experienced weight loss?: No  Which of the following makes your symptoms worse?: change in weather, exercise, exposure to fumes, exposure to smoke, strenuous activity  Which of the following makes your symptoms better?: rest

## 2022-05-19 NOTE — PROGRESS NOTES
Assessment/Plan:    Severe persistent asthma without complication  Vanesa Jacobson has finally gained good control of her asthma on her current inhaled therapy  She will maintain on Breo 201 puff daily and use her albuterol prior to exercise and p r n     I think her acid reflux and her chronic postnasal drip have been optimized to a point where her asthma is easier to control  I reviewed her peak flow diary and answered questions  She has of the rotator cuff surgery scheduled in the coming weeks and she certainly optimized as at low risk from a pulmonary standpoint for this procedure  I would clear her for surgery  She will come back and see me in 4 months time  Diagnoses and all orders for this visit:    Severe persistent asthma without complication    Other orders  -     sucralfate (CARAFATE) 1 g tablet; Take 1 g by mouth in the morning and 1 g in the evening  Subjective:      Patient ID: Diamond Man is a 66 y o  female  Kingsley Alcala is here for follow-up of her asthma  She feels her breathing is doing quite well  She maintained on Breo 1 puff daily and use her rescue inhaler infrequently  She does use it 30 minutes prior to exercise as prophylaxis  Her current GI medications are working well and her postnasal drip is under good control  primary symptoms  Associated symptoms include myalgias  Pertinent negatives include no chest pain, coughing, headaches or sore throat  The following portions of the patient's history were reviewed and updated as appropriate: allergies, current medications, past family history, past medical history, past social history, past surgical history and problem list     Review of Systems   Constitutional: Negative  Negative for appetite change and unexpected weight change  HENT: Positive for postnasal drip  Negative for ear pain, rhinorrhea, sneezing, sore throat and trouble swallowing  Eyes: Negative  Respiratory: Negative    Negative for cough, shortness of breath and wheezing  Cardiovascular: Negative  Negative for chest pain and leg swelling  Gastrointestinal: Negative  Endocrine: Negative  Genitourinary: Negative  Musculoskeletal: Positive for myalgias  Allergic/Immunologic: Negative  Neurological: Negative  Negative for headaches  Hematological: Negative  Objective:      /64   Pulse 72   Temp 97 7 °F (36 5 °C)   Resp 18   Ht 5' (1 524 m)   Wt 49 3 kg (108 lb 9 6 oz)   SpO2 100%   BMI 21 21 kg/m²          Physical Exam  Constitutional:       Appearance: She is well-developed  HENT:      Head: Normocephalic  Eyes:      Pupils: Pupils are equal, round, and reactive to light  Cardiovascular:      Rate and Rhythm: Normal rate  Heart sounds: No murmur heard  Pulmonary:      Effort: Pulmonary effort is normal  No respiratory distress  Breath sounds: Normal breath sounds  No wheezing or rales  Abdominal:      Palpations: Abdomen is soft  Musculoskeletal:         General: Normal range of motion  Cervical back: Normal range of motion and neck supple  Skin:     General: Skin is warm and dry  Neurological:      Mental Status: She is alert and oriented to person, place, and time           Answers for HPI/ROS submitted by the patient on 5/17/2022  Do you experience frequent throat clearing?: Yes  Chronicity: recurrent  When did you first notice your symptoms?: more than 1 year ago  How often do your symptoms occur?: constantly  Since you first noticed this problem, how has it changed?: waxing and waning  Do you have shortness of breath that occurs with effort or exertion?: Yes  Do you have ear congestion?: No  Do you have heartburn?: No  Do you have fatigue?: No  Do you have nasal congestion?: No  Do you have shortness of breath when lying flat?: No  Do you have shortness of breath when you wake up?: No  Do you have sweats?: No  Have you experienced weight loss?: No  Which of the following makes your symptoms worse?: change in weather, exercise, exposure to fumes, exposure to smoke, strenuous activity  Which of the following makes your symptoms better?: rest

## 2022-05-22 DIAGNOSIS — J45.30 MILD PERSISTENT ASTHMA WITHOUT COMPLICATION: ICD-10-CM

## 2022-06-06 ENCOUNTER — TELEPHONE (OUTPATIENT)
Dept: PULMONOLOGY | Facility: CLINIC | Age: 72
End: 2022-06-06

## 2022-06-06 NOTE — TELEPHONE ENCOUNTER
Patient calling saying she was seen by Dr Kyleihg Medina and cleared for surgery but states she recently received a recliner for after her surgery and it must of had some cleaning supply on it and it aggravated her asthma  She states she has been taking her inhaler but is not sure if this will clear up by her surgery on 6/9  She would like to know if she can still have her surgery and/or if there is something Dr Kyleigh Medina would recommend she do  Please advise

## 2022-06-06 NOTE — TELEPHONE ENCOUNTER
Called patient and made her aware  She states she did only use her rescue inhaler once today and did feel some relief  She will call back if she feels she needs more than that or if she has any other issues before her surgery

## 2022-06-22 ENCOUNTER — EVALUATION (OUTPATIENT)
Dept: PHYSICAL THERAPY | Facility: CLINIC | Age: 72
End: 2022-06-22
Payer: COMMERCIAL

## 2022-06-22 DIAGNOSIS — M25.511 CHRONIC RIGHT SHOULDER PAIN: ICD-10-CM

## 2022-06-22 DIAGNOSIS — G89.29 CHRONIC RIGHT SHOULDER PAIN: ICD-10-CM

## 2022-06-22 DIAGNOSIS — Z98.890 S/P RIGHT ROTATOR CUFF REPAIR: Primary | ICD-10-CM

## 2022-06-22 PROCEDURE — 97110 THERAPEUTIC EXERCISES: CPT | Performed by: PHYSICAL THERAPIST

## 2022-06-22 PROCEDURE — 97161 PT EVAL LOW COMPLEX 20 MIN: CPT | Performed by: PHYSICAL THERAPIST

## 2022-06-22 PROCEDURE — 97010 HOT OR COLD PACKS THERAPY: CPT | Performed by: PHYSICAL THERAPIST

## 2022-06-22 NOTE — LETTER
2022    Keegan Parker MD  [address] [facility] [address]    Patient: Sandy Waldron   YOB: 1950   Date of Visit: 2022     Encounter Diagnosis     ICD-10-CM    1  S/P right rotator cuff repair  Z98 890    2  Chronic right shoulder pain  M25 511     G89 29        Dear Dr Abhi Christopher: Thank you for your recent referral of Sandy Waldron  Please review the attached evaluation summary from [facility]'s recent visit  Please verify that you agree with the plan of care by signing the attached order  If you have any questions or concerns, please do not hesitate to call  I sincerely appreciate the opportunity to share in the care of one of your patients and hope to have another opportunity to work with you in the near future  Sincerely,    ERLINDA Shah, PT      Referring Provider:      I certify that I have read the below Plan of Care and certify the need for these services furnished under this plan of treatment while under my care  Keegan Parker MD  [address] [place] [id number]  Via Fax: [phone]          PT Evaluation     Today's date: 2022  Patient name: Sandy Waldron  : 1950  MRN: [id number]  Referring provider: Elicia Preston MD  Dx:   Encounter Diagnosis     ICD-10-CM    1  S/P right rotator cuff repair  Z98 890    2  Chronic right shoulder pain  M25 511     G89 29                   Assessment  Assessment details: Patient is a 66 y o  male with PT prescription following R RTC repair on 22  Patient presents with decreased shoulder and elbow ROM, decreased shoulder girdle strength, decreased upper quarter flexibility, and poor postural awareness  These impairments limit patient with bathing, dressing, reaching, completing ADLs, and recreational activities   To address impairments and improve function, patient would benefit from skilled PT consisting of manual therapy to improve ROM, therapeutic exercises to improve UE strength and flexibility per protocol, and patient education on home program     Impairments: abnormal or restricted ROM, impaired physical strength, lacks appropriate home exercise program, pain with function, scapular dyskinesis and poor posture     Symptom irritability: moderateUnderstanding of Dx/Px/POC: good   Prognosis: good    Goals  Short term goals:  Patient is independent in home program to support plan of care and improve function  - 2 weeks  Patient demonstrates 130 deg  flexion PROM so she can get dressed with less pain  - 3 weeks    Long term goals:  Patient demonstrates improvement in community participation with increase in FOTO score by 20%  - 12 weeks  Patient demonstrates at least 75% shoulder AROM for less difficulty reaching overhead and completing ADLs  - 12 weeks  Patient demonstrates at least 4+/5 shoulder strength so she can perform yard work without pain  - 12 weeks  Patient can sleep without waking from pain  - 6 weeks          Plan  Patient would benefit from: skilled physical therapy  Planned modality interventions: cryotherapy  Planned therapy interventions: activity modification, ADL training, body mechanics training, flexibility, functional ROM exercises, home exercise program, therapeutic exercise, therapeutic activities, stretching, strengthening, patient education, neuromuscular re-education, massage, manual therapy and joint mobilization  Frequency: 2x week  Duration in weeks: 12  Plan of Care beginning date: 6/22/2022  Plan of Care expiration date: 9/16/2022  Treatment plan discussed with: patient        Subjective Evaluation    History of Present Illness  Date of onset: 3/1/2022  Date of surgery: 6/9/2022  Mechanism of injury: Patient has prescription for PT following RTC repair on 6/9/22  Patient reports 10 year history of shoulder pain, especially when throwing a ball to grandchild   She noted gradual worsening over the past few months  She saw orthopedist and elected to have surgery  She saw surgeon 1 week post op and next follow up 22  Patient was told that she could drive when she felt ready and sleep without sling  Sling for 6 weeks    Symptoms: R shoulder weakness, some difficulty dressing and bathing, and is unable to reach overhead or lift any weight  She is unable to floss teeth currently  PMH: severe asthma   Pain  Current pain ratin  At best pain ratin  At worst pain rating: 3    Patient Goals  Patient goals for therapy: decreased pain and increased motion          Objective     Active Range of Motion   Left Shoulder   Flexion: 150 degrees   Abduction: 160 degrees   External rotation BTH: T2   Internal rotation BTB: T7     Additional Active Range of Motion Details  AROM of R shoulder deferred per protocol    Passive Range of Motion     Right Shoulder   Flexion: 45 degrees   External rotation 0°: 30 degrees   Internal rotation 0°: 72 (in sling) degrees     Right Elbow   Flexion: WFL  Extension: WFL  Forearm supination: 70 degrees   Forearm pronation: 80 degrees     Strength/Myotome Testing     Left Shoulder     Planes of Motion   Flexion: 4+   Abduction: 4+   External rotation at 45°: 5   Internal rotation at 45°: 5     Additional Strength Details  Strength testing deferred on R shoulder per protocol      Flowsheet Rows    Flowsheet Row Most Recent Value   PT/OT G-Codes    Current Score 40   Projected Score 67             Precautions: Passive phase  Follow protocol  Limit shld flexion PROM to 130, ER PROM to 30  EPOC 22        Manuals             Elbow PROM extension, sup/pronation             Shoulder PROM flexion and ER             STM                           Neuro Re-Ed                                                                 Ther Ex             Supine flexion PROM 5x10"            Supine ER wand PROM nv            Pt edu on plan of care, pathology, home program 5'            Pendulum a-p 10x            Pendulum m-l 10x            Elbow flex/ext AROM 10x            Wrist circles             gripper             Elbow supination/pronation AROM                                                                 Ther Activity                                       Gait Training                                       Modalities             Cold pack

## 2022-06-22 NOTE — PROGRESS NOTES
PT Evaluation     Today's date: 2022  Patient name: Cat Guzman  : 1950  MRN: [id number]  Referring provider: Sona Obregon MD  Dx:   Encounter Diagnosis     ICD-10-CM    1  S/P right rotator cuff repair  Z98 890    2  Chronic right shoulder pain  M25 511     G89 29                   Assessment  Assessment details: Patient is a 66 y o  female with PT prescription following R RTC repair on 22  Patient presents with decreased shoulder and elbow ROM, decreased shoulder girdle strength, decreased upper quarter flexibility, and poor postural awareness  These impairments limit patient with bathing, dressing, reaching, completing ADLs, and recreational activities  To address impairments and improve function, patient would benefit from skilled PT consisting of manual therapy to improve ROM, therapeutic exercises to improve UE strength and flexibility per protocol, and patient education on home program     Impairments: abnormal or restricted ROM, impaired physical strength, lacks appropriate home exercise program, pain with function, scapular dyskinesis and poor posture     Symptom irritability: moderateUnderstanding of Dx/Px/POC: good   Prognosis: good    Goals  Short term goals:  Patient is independent in home program to support plan of care and improve function  - 2 weeks  Patient demonstrates 130 deg  flexion PROM so she can get dressed with less pain  - 3 weeks    Long term goals:  Patient demonstrates improvement in community participation with increase in FOTO score by 20%  - 12 weeks  Patient demonstrates at least 75% shoulder AROM for less difficulty reaching overhead and completing ADLs  - 12 weeks  Patient demonstrates at least 4+/5 shoulder strength so she can perform yard work without pain  - 12 weeks  Patient can sleep without waking from pain   - 6 weeks          Plan  Patient would benefit from: skilled physical therapy  Planned modality interventions: cryotherapy  Planned therapy interventions: activity modification, ADL training, body mechanics training, flexibility, functional ROM exercises, home exercise program, therapeutic exercise, therapeutic activities, stretching, strengthening, patient education, neuromuscular re-education, massage, manual therapy and joint mobilization  Frequency: 2x week  Duration in weeks: 12  Plan of Care beginning date: 2022  Plan of Care expiration date: 2022  Treatment plan discussed with: patient        Subjective Evaluation    History of Present Illness  Date of onset: 3/1/2022  Date of surgery: 2022  Mechanism of injury: Patient has prescription for PT following RTC repair on 22  Patient reports 10 year history of shoulder pain, especially when throwing a ball to grandchild  She noted gradual worsening over the past few months  She saw orthopedist and elected to have surgery  She saw surgeon 1 week post op and next follow up 22  Patient was told that she could drive when she felt ready and sleep without sling  Sling for 6 weeks    Symptoms: R shoulder weakness, some difficulty dressing and bathing, and is unable to reach overhead or lift any weight  She is unable to floss teeth currently       PMH: severe asthma   Pain  Current pain ratin  At best pain ratin  At worst pain rating: 3    Patient Goals  Patient goals for therapy: decreased pain and increased motion          Objective     Active Range of Motion   Left Shoulder   Flexion: 150 degrees   Abduction: 160 degrees   External rotation BTH: T2   Internal rotation BTB: T7     Additional Active Range of Motion Details  AROM of R shoulder deferred per protocol    Passive Range of Motion     Right Shoulder   Flexion: 45 degrees   External rotation 0°: 30 degrees   Internal rotation 0°: 72 (in sling) degrees     Right Elbow   Flexion: WFL  Extension: WFL  Forearm supination: 70 degrees   Forearm pronation: 80 degrees     Strength/Myotome Testing     Left Shoulder     Planes of Motion   Flexion: 4+   Abduction: 4+   External rotation at 45°: 5   Internal rotation at 45°: 5     Additional Strength Details  Strength testing deferred on R shoulder per protocol      Flowsheet Rows    Flowsheet Row Most Recent Value   PT/OT G-Codes    Current Score 40   Projected Score 67             Precautions: Passive phase  Follow protocol  Limit shld flexion PROM to 130, ER PROM to 30  EPOC 9/16/22        Manuals 6/22            Elbow PROM extension, sup/pronation             Shoulder PROM flexion and ER             STM                           Neuro Re-Ed                                                                 Ther Ex             Supine flexion PROM 5x10"            Supine ER wand PROM nv            Pt edu on plan of care, pathology, home program 5'            Pendulum a-p 10x            Pendulum m-l 10x            Elbow flex/ext AROM 10x            Wrist circles             gripper             Elbow supination/pronation AROM                                                                 Ther Activity                                       Gait Training                                       Modalities             Cold pack

## 2022-06-27 ENCOUNTER — OFFICE VISIT (OUTPATIENT)
Dept: PHYSICAL THERAPY | Facility: CLINIC | Age: 72
End: 2022-06-27
Payer: COMMERCIAL

## 2022-06-27 DIAGNOSIS — M25.511 CHRONIC RIGHT SHOULDER PAIN: ICD-10-CM

## 2022-06-27 DIAGNOSIS — G89.29 CHRONIC RIGHT SHOULDER PAIN: ICD-10-CM

## 2022-06-27 DIAGNOSIS — Z98.890 S/P RIGHT ROTATOR CUFF REPAIR: Primary | ICD-10-CM

## 2022-06-27 PROCEDURE — 97140 MANUAL THERAPY 1/> REGIONS: CPT | Performed by: PHYSICAL THERAPY ASSISTANT

## 2022-06-27 PROCEDURE — 97110 THERAPEUTIC EXERCISES: CPT | Performed by: PHYSICAL THERAPY ASSISTANT

## 2022-06-27 NOTE — PROGRESS NOTES
Daily Note     Today's date: 2022  Patient name: Jace Ron  : 1950  MRN: [id number]  Referring provider: Kristi Betts MD  Dx:   Encounter Diagnosis     ICD-10-CM    1  S/P right rotator cuff repair  Z98 890    2  Chronic right shoulder pain  M25 511     G89 29                   Subjective: No new complaints  Pt  reports minimal "soreness" in R shoulder and states she is doing her HEP consistenttly  Objective: See treatment diary below      Assessment: Tolerated treatment well  Pt demonstrating good PROM within protocol limits  Pt denies any increased pain during TE  Patient exhibited good technique with therapeutic exercises and would benefit from continued PT      Plan: Continue per plan of care  Progress treatment as tolerated  Precautions: Passive phase  Follow protocol  Limit shld flexion PROM to 130, ER PROM to 30  EPOC 22        Manuals            Elbow PROM extension, sup/pronation  RK           Shoulder PROM flexion and ER  RK           STM                           Neuro Re-Ed                                                                 Ther Ex             Supine flexion PROM 5x10" 5x10"           Supine ER wand PROM nv 5x10"           Pt edu on plan of care, pathology, home program 5'            Pendulum a-p 10x x30           Pendulum m-l 10x x30           Elbow flex/ext AROM 10x 3x10           Wrist circles  x30           gripper  Green x30           Elbow supination/pronation AROM  3x10                                                               Ther Activity                                       Gait Training                                       Modalities             Cold pack

## 2022-07-05 DIAGNOSIS — J45.30 MILD PERSISTENT ASTHMA, UNSPECIFIED WHETHER COMPLICATED: ICD-10-CM

## 2022-07-05 RX ORDER — ALBUTEROL SULFATE 90 UG/1
2 AEROSOL, METERED RESPIRATORY (INHALATION) EVERY 4 HOURS PRN
Qty: 18 G | Refills: 5 | Status: SHIPPED | OUTPATIENT
Start: 2022-07-05

## 2022-07-06 ENCOUNTER — OFFICE VISIT (OUTPATIENT)
Dept: PHYSICAL THERAPY | Facility: CLINIC | Age: 72
End: 2022-07-06
Payer: COMMERCIAL

## 2022-07-06 DIAGNOSIS — M25.511 CHRONIC RIGHT SHOULDER PAIN: ICD-10-CM

## 2022-07-06 DIAGNOSIS — G89.29 CHRONIC RIGHT SHOULDER PAIN: ICD-10-CM

## 2022-07-06 DIAGNOSIS — Z98.890 S/P RIGHT ROTATOR CUFF REPAIR: Primary | ICD-10-CM

## 2022-07-06 PROCEDURE — 97140 MANUAL THERAPY 1/> REGIONS: CPT | Performed by: PHYSICAL THERAPIST

## 2022-07-06 PROCEDURE — 97110 THERAPEUTIC EXERCISES: CPT | Performed by: PHYSICAL THERAPIST

## 2022-07-06 NOTE — PROGRESS NOTES
Daily Note     Today's date: 2022  Patient name: Sidney Wright  : 1950  MRN: [id number]  Referring provider: Edward Boone MD  Dx:   Encounter Diagnosis     ICD-10-CM    1  S/P right rotator cuff repair  Z98 890    2  Chronic right shoulder pain  M25 511     G89 29                   Subjective: Patient reports that she slipped on slick surface and fell on her knees and landed on left hand over the weekend  R shoulder was in sling at the time, and she had no pain during or change in symptoms  No increase in swelling or bruising either  She has been consistent with exercises and feeling good  She has been using wrist/hand for some ADLs but stops if she feels it tenses up her shoulder at all  Objective: See treatment diary below    110 deg  Flexion PROM R shoulder    Assessment: Patient demonstrated significant improvement in shoulder flexion ROM compared to evaluation  Instructed patient to relax shoulder musculature during gripping exercise and she tolerated well with less tension felt following in her shoulder  Patient is progressing well with therapy and would benefit from continued skilled PT  Plan: Continue per plan of care, follow protocol  Precautions: Passive phase  Follow protocol  Limit shld flexion PROM to 130, ER PROM to 30  EPOC 22        Manuals           Elbow PROM extension, sup/pronation  RK EVELIO          Shoulder PROM flexion and ER  RK EVELIO          STM                           Neuro Re-Ed                                                                 Ther Ex             Supine flexion PROM 5x10" 5x10" 5x10"          Supine ER wand PROM nv 5x10" nv          Pt edu on plan of care, pathology, home program 5'  2'          Pendulum a-p 10x x30 20x          Pendulum m-l 10x x30 20x          Pendulum cw, ccw   10 ea          Elbow flex/ext AROM 10x 3x10 20x          Wrist circles  x30 alphabet 1x          gripper  Green x30 Blue 3x10          Elbow supination/pronation AROM  3x10 20x                                                              Ther Activity                                       Gait Training                                       Modalities             Cold pack

## 2022-07-13 ENCOUNTER — OFFICE VISIT (OUTPATIENT)
Dept: PHYSICAL THERAPY | Facility: CLINIC | Age: 72
End: 2022-07-13
Payer: COMMERCIAL

## 2022-07-13 DIAGNOSIS — G89.29 CHRONIC RIGHT SHOULDER PAIN: ICD-10-CM

## 2022-07-13 DIAGNOSIS — M25.511 CHRONIC RIGHT SHOULDER PAIN: ICD-10-CM

## 2022-07-13 DIAGNOSIS — Z98.890 S/P RIGHT ROTATOR CUFF REPAIR: Primary | ICD-10-CM

## 2022-07-13 PROCEDURE — 97110 THERAPEUTIC EXERCISES: CPT | Performed by: PHYSICAL THERAPIST

## 2022-07-13 PROCEDURE — 97140 MANUAL THERAPY 1/> REGIONS: CPT | Performed by: PHYSICAL THERAPIST

## 2022-07-13 NOTE — PROGRESS NOTES
Daily Note     Today's date: 2022  Patient name: Sidney Wright  : 1950  MRN: [id number]  Referring provider: Edward Boone MD  Dx:   Encounter Diagnosis     ICD-10-CM    1  S/P right rotator cuff repair  Z98 890    2  Chronic right shoulder pain  M25 511     G89 29                   Subjective: Patient reports that she was sore for about 1 day after last session but then it subsided  No pain today  Objective: See treatment diary below    130 deg  Flexion PROM R shoulder  Cervical AROM: flex/ext WNL, moderate restriction R rotation, mild L rotation, Moderate restriction L side bending, mild R side bending      Assessment: Patient required cuing to reduce speed with pendulum exercises but was able to perform without active shoulder compensation  Patient demonstrated further improvement in shoulder flexion PROM today, achieving full motion per allowance during this phase  Upon assessment, she displays some limited cervical AROM so ROM exercises were given to reduce tension to shoulder during rehab  Patient is progressing well with therapy and would benefit from continued skilled PT  Plan: Continue per plan of care, follow protocol  Precautions: DOS 22  Passive phase  Follow protocol  Limit shld flexion PROM to 130, ER PROM to 30  EPOC 22         Manuals          Elbow PROM extension, sup/pronation  RK EVELIO FRASER         Shoulder PROM flexion and ER  CHERI FRASER JP         STM                           Neuro Re-Ed                                                                 Ther Ex             Supine flexion PROM 5x10" 5x10" 5x10" 5x10"         Supine ER wand PROM nv 5x10" nv hold         Pt edu on plan of care, pathology, home program 5'  2' 2'         Pendulum a-p 10x x30 20x 20x         Pendulum m-l 10x x30 20x 20x         Pendulum cw, ccw   10 ea 10 ea         Elbow flex/ext AROM 10x 3x10 20x 20x         Wrist circles  x30 alphabet 1x alphabet         gripper Green x30 Blue 3x10 Blue 3x10         Elbow supination/pronation AROM  3x10 20x 20x         Cervical rotation arom    5x5"         Cervical side bending AROM    3x15"                                   Ther Activity                                       Gait Training                                       Modalities             Cold pack

## 2022-07-19 ENCOUNTER — APPOINTMENT (OUTPATIENT)
Dept: PHYSICAL THERAPY | Facility: CLINIC | Age: 72
End: 2022-07-19
Payer: COMMERCIAL

## 2022-07-21 ENCOUNTER — OFFICE VISIT (OUTPATIENT)
Dept: PHYSICAL THERAPY | Facility: CLINIC | Age: 72
End: 2022-07-21
Payer: COMMERCIAL

## 2022-07-21 DIAGNOSIS — Z98.890 S/P RIGHT ROTATOR CUFF REPAIR: Primary | ICD-10-CM

## 2022-07-21 DIAGNOSIS — M25.511 CHRONIC RIGHT SHOULDER PAIN: ICD-10-CM

## 2022-07-21 DIAGNOSIS — G89.29 CHRONIC RIGHT SHOULDER PAIN: ICD-10-CM

## 2022-07-21 PROCEDURE — 97110 THERAPEUTIC EXERCISES: CPT | Performed by: PHYSICAL THERAPIST

## 2022-07-21 PROCEDURE — 97140 MANUAL THERAPY 1/> REGIONS: CPT | Performed by: PHYSICAL THERAPIST

## 2022-07-21 NOTE — PROGRESS NOTES
Daily Note     Today's date: 2022  Patient name: Bandar Vines  : 1950  MRN: [id number]  Referring provider: Abhishek Kidd MD  Dx:   Encounter Diagnosis     ICD-10-CM    1  S/P right rotator cuff repair  Z98 890    2  Chronic right shoulder pain  M25 511     G89 29                   Subjective: Patient reports that she had good follow up with surgeon, had sling discharged but was told no lifting more than 1lb  She was also given updated prescription for beginning phase 2 ROM/stretching and phase 1 strengthening (see attached)  Objective: See treatment diary below    Cervical AROM: flex/ext WNL, mild restriction R rotation, mild L rotation, Moderate restriction L side bending, mild R side bending      Assessment: Patient demonstrated further shoulder flexion and ER ROM today  Home program was updated to include cross body adduction stretch and ER yumiko TOTH  Instructed patient to no push into pain with ROM exercises  Initiated strengthening per protocol with RTC isometrics, submaximal  Patient is progressing well with therapy and would benefit from continued skilled PT  Plan: Continue per plan of care, follow protocol  Progress Report NV     Precautions: DOS 22  Passive phase  Follow protocol  EPOC 22         Manuals         Elbow PROM extension, sup/pronation  RK EVELIO EVELIO         Shoulder PROM flexion and ER  RK EVELIO EVELIO EVELIO        STM                           Neuro Re-Ed                                                                 Ther Ex             Supine flexion PROM 5x10" 5x10" 5x10" 5x10" 5x10"        Pt edu on plan of care, pathology, home program 5'  2' 2' 2'        Pendulum a-p 10x x30 20x 20x 20x        Pendulum m-l 10x x30 20x 20x 20x        Pendulum cw, ccw   10 ea 10 ea 10 ea        Elbow flex/ext AROM 10x 3x10 20x 20x         Wrist circles  x30 alphabet 1x alphabet D/c        gripper  Green x30 Blue 3x10 Blue 3x10         Elbow supination/pronation AROM  3x10 20x 20x D/c        Cervical rotation arom    5x5" 5x5"        Cervical side bending AROM    3x15" 3x15"        shld ext isometric     5x10"        ER isometric     5x10"        IR isometric     5x10"        Wand ER AAROM     5x10"        Cross body adduction     5x10"        Ther Activity                                       Gait Training                                       Modalities             Cold pack

## 2022-07-25 ENCOUNTER — OFFICE VISIT (OUTPATIENT)
Dept: PHYSICAL THERAPY | Facility: CLINIC | Age: 72
End: 2022-07-25
Payer: COMMERCIAL

## 2022-07-25 DIAGNOSIS — Z98.890 S/P RIGHT ROTATOR CUFF REPAIR: Primary | ICD-10-CM

## 2022-07-25 DIAGNOSIS — M25.511 CHRONIC RIGHT SHOULDER PAIN: ICD-10-CM

## 2022-07-25 DIAGNOSIS — G89.29 CHRONIC RIGHT SHOULDER PAIN: ICD-10-CM

## 2022-07-25 PROCEDURE — 97140 MANUAL THERAPY 1/> REGIONS: CPT | Performed by: PHYSICAL THERAPIST

## 2022-07-25 PROCEDURE — 97110 THERAPEUTIC EXERCISES: CPT | Performed by: PHYSICAL THERAPIST

## 2022-07-25 NOTE — PROGRESS NOTES
Progress Report    Today's date: 2022  Patient name: Lizette Shearer  : 1950  MRN: [id number]  Referring provider: Yael Herrera MD  Dx:   Encounter Diagnosis     ICD-10-CM    1  S/P right rotator cuff repair  Z98 890    2  Chronic right shoulder pain  M25 511     G89 29                   Subjective: Patient reports that her shoulder has been a little more sore over the past few days  She started wearing the sling again last night which her surgeon said she could do as needed  She has been wearing it for several hours at a time  She reports 1/10 shoulder pain at rest today, good consistency with home program  She isn't woken as often from pain and reports that she is able to do more with her shoulder, like put soap on when bathing, using right arm  Objective: See treatment diary below    Shoulder flexion AROM: 90 deg  shld flexion PROM 136  IR PROM: 60  ER PROM 58    Assessment: Patient demonstrates improvement in shoulder active and passive ROM compared to start of care  She presented with some soft tissue restrictions along upper trap and posterior shoulder, but tolerated STM well  Patient continues to display tightness throughout shoulder and muscle weakness, but this is anticipated due to phase of rehab post surgery  Patient has been treated for 5 weeks and is near 7 weeks post op  Overall, patient is progressing well with therapy and requires continued skilled PT to further progress upon deficits and meet long term goals  Plan: Continue per plan of care, follow protocol  Precautions: DOS 22  Passive phase  Follow protocol  EPOC 22         Manuals        Elbow PROM extension, sup/pronation  RK EVELIO EVELIO         Shoulder PROM flexion and ER  RK EVELIO EVELIO EVELIO EVELIO       STM UT and posterior shld      EVELIO                    Neuro Re-Ed                                                                 Ther Ex             Supine flexion PROM 5x10" 5x10" 5x10" 5x10" 5x10" 5x10" wand       Pulley standing      5x10"       Pt edu on plan of care, pathology, home program 5'  2' 2' 2' 4'       Pendulum a-p 10x x30 20x 20x 20x        Pendulum m-l 10x x30 20x 20x 20x        Pendulum cw, ccw   10 ea 10 ea 10 ea        Elbow flex/ext AROM 10x 3x10 20x 20x         gripper  Green x30 Blue 3x10 Blue 3x10         Cervical rotation arom    5x5" 5x5" 5x5"       Cervical side bending AROM    3x15" 3x15" 3x15"       shld ext isometric     5x10" 5x10"       ER isometric     5x10" 5x10"       IR isometric     5x10" 5x10"       Wand ER AAROM     5x10" 5x10"       Cross body adduction     5x10" 5x10"       Ther Activity                                       Gait Training                                       Modalities             Cold pack

## 2022-07-27 ENCOUNTER — OFFICE VISIT (OUTPATIENT)
Dept: PHYSICAL THERAPY | Facility: CLINIC | Age: 72
End: 2022-07-27
Payer: COMMERCIAL

## 2022-07-27 DIAGNOSIS — Z98.890 S/P RIGHT ROTATOR CUFF REPAIR: Primary | ICD-10-CM

## 2022-07-27 DIAGNOSIS — G89.29 CHRONIC RIGHT SHOULDER PAIN: ICD-10-CM

## 2022-07-27 DIAGNOSIS — M25.511 CHRONIC RIGHT SHOULDER PAIN: ICD-10-CM

## 2022-07-27 PROCEDURE — 97140 MANUAL THERAPY 1/> REGIONS: CPT | Performed by: PHYSICAL THERAPIST

## 2022-07-27 PROCEDURE — 97110 THERAPEUTIC EXERCISES: CPT | Performed by: PHYSICAL THERAPIST

## 2022-07-27 NOTE — PROGRESS NOTES
Daily Note    Today's date: 2022  Patient name: Sanam Cox  : 1950  MRN: [id number]  Referring provider: Candida Lam MD  Dx:   Encounter Diagnosis     ICD-10-CM    1  S/P right rotator cuff repair  Z98 890    2  Chronic right shoulder pain  M25 511     G89 29                   Subjective: Patient reports that she is feeling better than she was earlier this week  Objective: See treatment diary below      Assessment: Progressed flexibility exercises with addition of IR PROM with strap  She noted discomfort initially so educated patient on not pushing into painful range  She tolerated better following cues  Patient presented with less soft tissue restrictions along shoulder and upper trapezius compared to previous session  Overall, patient is progressing well with therapy and requires continued skilled PT to further progress upon deficits and meet long term goals  Plan: Continue per plan of care, follow protocol  Precautions: DOS 22  Passive phase  Follow protocol  EPOC 22         Manuals       Elbow PROM extension, sup/pronation  RK EVELIO EVELIO   EVELIO      Shoulder PROM flexion and ER  RK EVELIO EVELIO EVELIO EVELIO EVELIO      STM UT and posterior shld      EVELIO EVELIO                   Neuro Re-Ed                                                                 Ther Ex             Supine flexion PROM 5x10" 5x10" 5x10" 5x10" 5x10" 5x10" wand 10x5"      Pulley standing      5x10"       Pt edu on plan of care, pathology, home program 5'  2' 2' 2' 4' 2'      Pendulum a-p 10x x30 20x 20x 20x        Pendulum m-l 10x x30 20x 20x 20x        Pendulum cw, ccw   10 ea 10 ea 10 ea        Elbow flex/ext AROM 10x 3x10 20x 20x         IR PROM with strap       5x10"      gripper  Green x30 Blue 3x10 Blue 3x10         Cervical rotation arom    5x5" 5x5" 5x5" 5x5"      Cervical side bending AROM    3x15" 3x15" 3x15" 3x15"      shld ext isometric     5x10" 5x10" 5x10"      ER isometric 5x10" 5x10" 5x10"      IR isometric     5x10" 5x10" 5x10"      Wand ER AAROM     5x10" 5x10" 5x10"      Cross body adduction     5x10" 5x10" 5x10"      Ther Activity                                       Gait Training                                       Modalities             Cold pack

## 2022-08-02 ENCOUNTER — OFFICE VISIT (OUTPATIENT)
Dept: PHYSICAL THERAPY | Facility: CLINIC | Age: 72
End: 2022-08-02
Payer: COMMERCIAL

## 2022-08-02 DIAGNOSIS — Z98.890 S/P RIGHT ROTATOR CUFF REPAIR: Primary | ICD-10-CM

## 2022-08-02 DIAGNOSIS — M25.511 CHRONIC RIGHT SHOULDER PAIN: ICD-10-CM

## 2022-08-02 DIAGNOSIS — G89.29 CHRONIC RIGHT SHOULDER PAIN: ICD-10-CM

## 2022-08-02 PROCEDURE — 97140 MANUAL THERAPY 1/> REGIONS: CPT | Performed by: PHYSICAL THERAPIST

## 2022-08-02 PROCEDURE — 97110 THERAPEUTIC EXERCISES: CPT | Performed by: PHYSICAL THERAPIST

## 2022-08-02 NOTE — PROGRESS NOTES
Daily Note    Today's date: 2022  Patient name: Chelsie Carlos  : 1950  MRN: [id number]  Referring provider: Obed Zabala MD  Dx:   Encounter Diagnosis     ICD-10-CM    1  S/P right rotator cuff repair  Z98 890    2  Chronic right shoulder pain  M25 511     G89 29                   Subjective: Patient reports that she hasn't had to wear sling since last session and overall is feeling pretty good  She was able to go on a longer drive this past weekend as well  Objective: See treatment diary below      Assessment: Patient continues to have stiffness and pain with IR PROM stretch  Progressed ROM exercises with wand extension AAROM which she tolerated without pain  She was also able to tolerate RTC/scapula strengthening progression from wall isometrics to theraband walkout/isometrics  Overall, patient is progressing well with therapy and requires continued skilled PT to further progress upon deficits and meet long term goals  Plan: Continue per plan of care, follow protocol  Precautions: DOS 22  Follow protocol  EPOC 22         Manuals  8/     Elbow PROM extension, sup/pronation  RK EVELIO EVELIO   EVELIO EVELIO     Shoulder PROM flexion and ER  RK EVELIO EVELIO EVELIO EVELIO EVELIO EVELIO     STM UT and posterior shld      EVELIO EVELIO EVELIO                  Neuro Re-Ed                                                                 Ther Ex             Supine flex AAROM        10x5"     Supine flexion PROM 5x10" 5x10" 5x10" 5x10" 5x10" 5x10" wand 10x5"      Pulley standing      5x10"       Pt edu on plan of care, pathology, home program 5'  2' 2' 2' 4' 2' 2'     Pendulum a-p 10x x30 20x 20x 20x        Pendulum m-l 10x x30 20x 20x 20x        Pendulum cw, ccw   10 ea 10 ea 10 ea   10 ea     IR PROM with strap       5x10" 5x10"     Cervical rotation arom    5x5" 5x5" 5x5" 5x5"      Cervical side bending AROM    3x15" 3x15" 3x15" 3x15"      shld ext isometric     5x10" 5x10" 5x10" Tb walkout rtb 10x ER isometric     5x10" 5x10" 5x10" Tb walkout rtb 10x     IR isometric     5x10" 5x10" 5x10" Tb walkout rtb 10x     Wand ER AAROM     5x10" 5x10" 5x10" 5x10"     Wand ext AAROM        5x10"     Cross body adduction     5x10" 5x10" 5x10" 5x10"     Ther Activity                                       Gait Training                                       Modalities             Cold pack

## 2022-08-04 ENCOUNTER — OFFICE VISIT (OUTPATIENT)
Dept: PHYSICAL THERAPY | Facility: CLINIC | Age: 72
End: 2022-08-04
Payer: COMMERCIAL

## 2022-08-04 DIAGNOSIS — M25.511 CHRONIC RIGHT SHOULDER PAIN: ICD-10-CM

## 2022-08-04 DIAGNOSIS — Z98.890 S/P RIGHT ROTATOR CUFF REPAIR: Primary | ICD-10-CM

## 2022-08-04 DIAGNOSIS — G89.29 CHRONIC RIGHT SHOULDER PAIN: ICD-10-CM

## 2022-08-04 PROCEDURE — 97140 MANUAL THERAPY 1/> REGIONS: CPT | Performed by: PHYSICAL THERAPIST

## 2022-08-04 PROCEDURE — 97110 THERAPEUTIC EXERCISES: CPT | Performed by: PHYSICAL THERAPIST

## 2022-08-04 NOTE — PROGRESS NOTES
Daily Note    Today's date: 2022  Patient name: Sanam Cox  : 1950  MRN: [id number]  Referring provider: Candida Lam MD  Dx:   Encounter Diagnosis     ICD-10-CM    1  S/P right rotator cuff repair  Z98 890    2  Chronic right shoulder pain  M25 511     G89 29                   Subjective: Patient reports some increase in shoulder soreness today but unsure why  Objective: See treatment diary below      Assessment: Patient noted less pain following manual therapy and passive stretching to shoulder  Instructed patient in performing IR with strap exercise without pushing into discomfort  Regressed RTC strengthening from TB walkouts back to wall isometrics due to soreness after last session  Overall, patient is progressing well with therapy and requires continued skilled PT to further progress upon deficits and meet long term goals  Plan: Continue per plan of care, follow protocol  Precautions: DOS 22  Follow protocol  EPOC 22         Manuals  8    Elbow PROM extension, sup/pronation  RK EVELIO EVELIO   EVELIO EVELIO     Shoulder PROM flexion and ER  RK EVELIO EVELIO EVELIO EVELIO EVELIO EVELIO EVELIO    STM UT and posterior shld      EVELIO EVELIO EVELIO EVELIO                 Neuro Re-Ed                          scap retraction         5x                              Ther Ex             Supine flex AAROM        10x5" 10x5"    Supine flexion PROM 5x10" 5x10" 5x10" 5x10" 5x10" 5x10" wand 10x5"      Pulley standing      5x10"       Pt edu on plan of care, pathology, home program 5'  2' 2' 2' 4' 2' 2' 2'    Pendulum a-p 10x x30 20x 20x 20x        Pendulum m-l 10x x30 20x 20x 20x        Pendulum cw, ccw   10 ea 10 ea 10 ea   10 ea     IR PROM with strap       5x10" 5x10" 5x10"    Cervical rotation arom    5x5" 5x5" 5x5" 5x5"      Cervical side bending AROM    3x15" 3x15" 3x15" 3x15"      shld ext isometric     5x10" 5x10" 5x10" Tb walkout rtb 10x isometric 5x10"    ER isometric     5x10" 5x10" 5x10" Tb walkout rtb 10x isometric 5x10"    IR isometric     5x10" 5x10" 5x10" Tb walkout rtb 10x isometric 5x10"    Wand ER AAROM     5x10" 5x10" 5x10" 5x10" 5x10"    Wand ext AAROM        5x10" 10x5"    Cross body adduction     5x10" 5x10" 5x10" 5x10" 5x10"    Ther Activity                                       Gait Training                                       Modalities             Cold pack

## 2022-08-09 ENCOUNTER — OFFICE VISIT (OUTPATIENT)
Dept: PHYSICAL THERAPY | Facility: CLINIC | Age: 72
End: 2022-08-09
Payer: COMMERCIAL

## 2022-08-09 DIAGNOSIS — Z98.890 S/P RIGHT ROTATOR CUFF REPAIR: Primary | ICD-10-CM

## 2022-08-09 DIAGNOSIS — G89.29 CHRONIC RIGHT SHOULDER PAIN: ICD-10-CM

## 2022-08-09 DIAGNOSIS — M25.511 CHRONIC RIGHT SHOULDER PAIN: ICD-10-CM

## 2022-08-09 PROCEDURE — 97112 NEUROMUSCULAR REEDUCATION: CPT | Performed by: PHYSICAL THERAPIST

## 2022-08-09 PROCEDURE — 97110 THERAPEUTIC EXERCISES: CPT | Performed by: PHYSICAL THERAPIST

## 2022-08-09 PROCEDURE — 97140 MANUAL THERAPY 1/> REGIONS: CPT | Performed by: PHYSICAL THERAPIST

## 2022-08-09 NOTE — PROGRESS NOTES
Daily Note    Today's date: 2022  Patient name: Tod Gowers  : 1950  MRN: [id number]  Referring provider: Demarco Bray MD  Dx:   Encounter Diagnosis     ICD-10-CM    1  S/P right rotator cuff repair  Z98 890    2  Chronic right shoulder pain  M25 511     G89 29                   Subjective: Patient reports intermittent shoulder soreness but it is usually in response to using her arm like when eating, and then soreness goes away  She notes recent improvement in ability to wash face with use of both hands  Objective: See treatment diary below  IR PROM Level L5 with strap      Assessment: Patient demonstrated improvement in IR PROM today but continues to have pain and tightness with this motion  Progressed scapular/RTC strengthening with addition of shoulder extension with theratubing and scapular retraction  She initially noted soreness but with cues for scapular retraction and using less resistance, discomfort subsided  Overall, patient is progressing well with therapy and requires continued skilled PT to further progress upon deficits and meet long term goals  Plan: Continue per plan of care, follow protocol  Precautions: DOS 22  Follow protocol  EPOC 22         Manuals    Elbow PROM extension, sup/pronation  RK EVELIO EVELIO   EVELIO EVELIO     Shoulder PROM flexion and ER  RK EVELOI EVELIO EVELIO EVELIO EVELIO EVELIO EVELIO EVELIO   STM UT and posterior shld      EVELIO EVELIO EVELIO EVELIO EVELIO                Neuro Re-Ed                          scap retraction         5x 10x   shld ext with retraction          10x rtb                Ther Ex             Supine flex AAROM        10x5" 10x5" 10x5"   Supine flexion PROM 5x10" 5x10" 5x10" 5x10" 5x10" 5x10" wand 10x5"      Pulley standing      5x10"       Pt edu on plan of care, pathology, home program 5'  2' 2' 2' 4' 2' 2' 2'    Pendulum a-p 10x x30 20x 20x 20x        Pendulum m-l 10x x30 20x 20x 20x        Pendulum cw, ccw   10 ea 10 ea 10 ea 10 ea     IR PROM with strap       5x10" 5x10" 5x10" 5x5"   Cervical rotation arom    5x5" 5x5" 5x5" 5x5"      Cervical side bending AROM    3x15" 3x15" 3x15" 3x15"      shld ext isometric     5x10" 5x10" 5x10" Tb walkout rtb 10x isometric 5x10"    ER isometric     5x10" 5x10" 5x10" Tb walkout rtb 10x isometric 5x10" isometric 5x10"   IR isometric     5x10" 5x10" 5x10" Tb walkout rtb 10x isometric 5x10" isometric 5x10"   Wand ER AAROM     5x10" 5x10" 5x10" 5x10" 5x10" 5x10"   Wand ext AAROM        5x10" 10x5" 10x5"   Cross body adduction     5x10" 5x10" 5x10" 5x10" 5x10" 5x10"   Ther Activity                                       Gait Training                                       Modalities             Cold pack

## 2022-08-11 ENCOUNTER — OFFICE VISIT (OUTPATIENT)
Dept: PHYSICAL THERAPY | Facility: CLINIC | Age: 72
End: 2022-08-11
Payer: COMMERCIAL

## 2022-08-11 DIAGNOSIS — G89.29 CHRONIC RIGHT SHOULDER PAIN: ICD-10-CM

## 2022-08-11 DIAGNOSIS — Z98.890 S/P RIGHT ROTATOR CUFF REPAIR: Primary | ICD-10-CM

## 2022-08-11 DIAGNOSIS — M25.511 CHRONIC RIGHT SHOULDER PAIN: ICD-10-CM

## 2022-08-11 PROCEDURE — 97110 THERAPEUTIC EXERCISES: CPT | Performed by: PHYSICAL THERAPIST

## 2022-08-11 PROCEDURE — 97140 MANUAL THERAPY 1/> REGIONS: CPT | Performed by: PHYSICAL THERAPIST

## 2022-08-11 PROCEDURE — 97112 NEUROMUSCULAR REEDUCATION: CPT | Performed by: PHYSICAL THERAPIST

## 2022-08-11 NOTE — PROGRESS NOTES
Daily Note    Today's date: 2022  Patient name: Elba Escobedo  : 1950  MRN: [id number]  Referring provider: Reyes Ybarra MD  Dx:   Encounter Diagnosis     ICD-10-CM    1  S/P right rotator cuff repair  Z98 890    2  Chronic right shoulder pain  M25 511     G89 29                   Subjective: Patient reports that her shoulder is feeling pretty good today  She still feels limited with using her arm for things like cutting up food  Objective: See treatment diary below    148 deg  Flexion PROM      Assessment: Patient demonstrated improvement in flexion PROM today  Progressed strengthening with increase in sets for shoulder extension and resuming IR/ER theratubing walkouts  Overall, patient is progressing well with therapy and requires continued skilled PT to further progress upon deficits and meet long term goals  Plan: Continue per plan of care, follow protocol  Precautions: DOS 22  Follow protocol  EPOC 22         Manuals    Elbow PROM extension, sup/pronation        EVELIO     Shoulder PROM flexion and ER EVELIO       EVELIO EVELIO EVELIO   STM UT and posterior shld EVELIO       EVELIO EVELIO EVELIO                Neuro Re-Ed                          scap retraction 10x        5x 10x   shld ext with retraction 2x10 rtb         10x rtb                Ther Ex             Supine flex AAROM 10x5"       10x5" 10x5" 10x5"   Supine flexion PROM             Pulley standing             Pt edu on plan of care, pathology, home program        2' 2'    IR PROM with strap 5x10"       5x10" 5x10" 5x5"   shld ext isometric        Tb walkout rtb 10x isometric 5x10"    ER isometric Tb walkout rtb 10x       Tb walkout rtb 10x isometric 5x10" isometric 5x10"   IR isometric Tb walkout rtb 10x       Tb walkout rtb 10x isometric 5x10" isometric 5x10"   Wand ER AAROM 5x10"       5x10" 5x10" 5x10"   Wand ext AAROM 10x5"       5x10" 10x5" 10x5"   Cross body adduction 5x10"       5x10" 5x10" 5x10"   Ther Activity Gait Training                                       Modalities             Cold pack

## 2022-08-16 ENCOUNTER — OFFICE VISIT (OUTPATIENT)
Dept: PHYSICAL THERAPY | Facility: CLINIC | Age: 72
End: 2022-08-16
Payer: COMMERCIAL

## 2022-08-16 DIAGNOSIS — G89.29 CHRONIC RIGHT SHOULDER PAIN: ICD-10-CM

## 2022-08-16 DIAGNOSIS — M25.511 CHRONIC RIGHT SHOULDER PAIN: ICD-10-CM

## 2022-08-16 DIAGNOSIS — Z98.890 S/P RIGHT ROTATOR CUFF REPAIR: Primary | ICD-10-CM

## 2022-08-16 PROCEDURE — 97140 MANUAL THERAPY 1/> REGIONS: CPT | Performed by: PHYSICAL THERAPIST

## 2022-08-16 PROCEDURE — 97110 THERAPEUTIC EXERCISES: CPT | Performed by: PHYSICAL THERAPIST

## 2022-08-16 PROCEDURE — 97112 NEUROMUSCULAR REEDUCATION: CPT | Performed by: PHYSICAL THERAPIST

## 2022-08-16 NOTE — PROGRESS NOTES
Daily Note    Today's date: 2022  Patient name: Dexter Bamberger  : 1950  MRN: [id number]  Referring provider: Fabiano Denton MD  Dx:   Encounter Diagnosis     ICD-10-CM    1  S/P right rotator cuff repair  Z98 890    2  Chronic right shoulder pain  M25 511     G89 29                   Subjective: Patient reports that she is using her shoulder more each week  She felt aggravation yesterday from actively reaching behind her back, but it has since subsided  Objective: See treatment diary below        Assessment: Progressed AROM exercises with addition of table slides, which she tolerated without pain or difficulty  Progressed RTC strengthening with transition from IR/ER isometric to isotonic with TB  She performed this also without pain  She noted less difficulty with IR PROM with strap compared to when trying it at home  Overall, patient is progressing well with therapy and requires continued skilled PT to further progress upon deficits and meet long term goals  Plan: Continue per plan of care, follow protocol  Precautions: DOS 22  Follow protocol  EPOC 22         Manuals    Elbow PROM extension, sup/pronation        EVELIO     Shoulder PROM flexion and ER EVELIO EVELIO      EVELIO EVELIO EVELIO   STM UT and posterior shld EVELIO EVELIO      EVELIO EVELIO EVELIO                Neuro Re-Ed                          scap retraction 10x 10x       5x 10x   shld ext with retraction 2x10 rtb 2x10 rtb        10x rtb                Ther Ex             Supine flex AAROM 10x5" 10x5"      10x5" 10x5" 10x5"   Supine flexion PROM             Pulley standing             Pt edu on plan of care, pathology, home program  3'      2' 2'    IR PROM with strap 5x10" 5x10"      5x10" 5x10" 5x5"   Table slides  10x           shld ext isometric        Tb walkout rtb 10x isometric 5x10"    ER isometric Tb walkout rtb 10x 10x rtb isotonic      Tb walkout rtb 10x isometric 5x10" isometric 5x10"   IR isometric Tb walkout rtb 10x 10x rtb isotonic      Tb walkout rtb 10x isometric 5x10" isometric 5x10"   Wand ER AAROM 5x10"       5x10" 5x10" 5x10"   Wand ext AAROM 10x5" 10x5"      5x10" 10x5" 10x5"   Cross body adduction 5x10" 5x10"      5x10" 5x10" 5x10"   Ther Activity                                       Gait Training                                       Modalities             Cold pack

## 2022-08-18 ENCOUNTER — OFFICE VISIT (OUTPATIENT)
Dept: PHYSICAL THERAPY | Facility: CLINIC | Age: 72
End: 2022-08-18
Payer: COMMERCIAL

## 2022-08-18 DIAGNOSIS — G89.29 CHRONIC RIGHT SHOULDER PAIN: ICD-10-CM

## 2022-08-18 DIAGNOSIS — Z98.890 S/P RIGHT ROTATOR CUFF REPAIR: Primary | ICD-10-CM

## 2022-08-18 DIAGNOSIS — M25.511 CHRONIC RIGHT SHOULDER PAIN: ICD-10-CM

## 2022-08-18 PROCEDURE — 97110 THERAPEUTIC EXERCISES: CPT | Performed by: PHYSICAL THERAPIST

## 2022-08-18 PROCEDURE — 97140 MANUAL THERAPY 1/> REGIONS: CPT | Performed by: PHYSICAL THERAPIST

## 2022-08-18 NOTE — PROGRESS NOTES
Daily Note    Today's date: 2022  Patient name: Ngoc Stover  : 1950  MRN: [id number]  Referring provider: Santos France MD  Dx:   Encounter Diagnosis     ICD-10-CM    1  S/P right rotator cuff repair  Z98 890    2  Chronic right shoulder pain  M25 511     G89 29                   Subjective: Patient reports that her shoulder has been feeling pretty good this week and no soreness following last session  Objective: See treatment diary below      Assessment: Progressed AROM exercises with addition of wall slides  Patient initially had difficulty but they improved with several reps, and no pain reported  Progressed RTC strengthening with increase in sets with resisted IR/ER  Again, she performed without pain  Overall, patient is progressing well with therapy and requires continued skilled PT to further progress upon deficits and meet long term goals  Plan: Continue per plan of care, follow protocol  Precautions: DOS 22  Follow protocol  EPOC 22         Manuals    Elbow PROM extension, sup/pronation        EVELIO     Shoulder PROM flexion and ER EVELIO EVELIO EVELIO     EVELIO EVELIO EVELIO   STM UT and posterior shld EVELIO EVELIO      EVELIO EVELIO EVELIO                Neuro Re-Ed                          scap retraction 10x 10x       5x 10x   shld ext with retraction 2x10 rtb 2x10 rtb 2x10 rtb       10x rtb   ER with retraction   2x10 rtb          IR with retraction   2x10 rtb          Ther Ex             Supine flex AAROM 10x5" 10x5" 5x5"     10x5" 10x5" 10x5"   Pulley standing             Pt edu on plan of care, pathology, home program  3'      2' 2'    IR PROM with strap 5x10" 5x10" 5x10"     5x10" 5x10" 5x5"   Table slides  10x           Wall slides   5x5"          Wand ER AAROM 5x10"       5x10" 5x10" 5x10"   Wand ext AAROM 10x5" 10x5" 10x5"     5x10" 10x5" 10x5"   Wand abd AAROM   5x5"          Cross body adduction 5x10" 5x10" 5x10"     5x10" 5x10" 5x10"   Ther Activity Gait Training                                       Modalities             Cold pack

## 2022-08-23 ENCOUNTER — OFFICE VISIT (OUTPATIENT)
Dept: PHYSICAL THERAPY | Facility: CLINIC | Age: 72
End: 2022-08-23
Payer: COMMERCIAL

## 2022-08-23 DIAGNOSIS — M25.511 CHRONIC RIGHT SHOULDER PAIN: ICD-10-CM

## 2022-08-23 DIAGNOSIS — G89.29 CHRONIC RIGHT SHOULDER PAIN: ICD-10-CM

## 2022-08-23 DIAGNOSIS — Z98.890 S/P RIGHT ROTATOR CUFF REPAIR: Primary | ICD-10-CM

## 2022-08-23 PROCEDURE — 97110 THERAPEUTIC EXERCISES: CPT | Performed by: PHYSICAL THERAPIST

## 2022-08-23 PROCEDURE — 97140 MANUAL THERAPY 1/> REGIONS: CPT | Performed by: PHYSICAL THERAPIST

## 2022-08-23 PROCEDURE — 97112 NEUROMUSCULAR REEDUCATION: CPT | Performed by: PHYSICAL THERAPIST

## 2022-08-23 NOTE — PROGRESS NOTES
Daily Note    Today's date: 2022  Patient name: Daryn Justin  : 1950  MRN: [id number]  Referring provider: Adriana Witt MD  Dx:   Encounter Diagnosis     ICD-10-CM    1  S/P right rotator cuff repair  Z98 890    2  Chronic right shoulder pain  M25 511     G89 29                   Subjective: Patient reports that she is now able to use her arm to cut vegetables  She notes mild anterior shoulder soreness following but it subsides with rest  She has follow up with surgeon tomorrow  Objective: See treatment diary below    120 deg  Flexion AROM    Assessment: Patient demonstrated good improvement in shoulder flexion AROM today  She noted appropriate muscle fatigue with IR/ER strengthening but no pain  She is most limited with IR ROM, but this is gradually improving as well  Overall, patient is progressing well with therapy and requires continued skilled PT to further progress upon deficits and meet long term goals  Plan: Continue per plan of care, follow protocol  Progress Report next visit     Precautions: DOS 22  Follow protocol  EPOC 22         Manuals    Elbow PROM extension, sup/pronation        EVELIO     Shoulder PROM flexion and ER EVELIO EVELIO EVELIO EVELIO    EVELIO EVELIO EVELIO   STM UT and posterior shld EVELIO EVELIO  EVELIO    EVELIO EVELIO EVELIO                Neuro Re-Ed                          scap retraction 10x 10x       5x 10x   shld ext with retraction 2x10 rtb 2x10 rtb 2x10 rtb 2x10 rtb      10x rtb   ER with retraction   2x10 rtb 2x10 rtb         IR with retraction   2x10 rtb 15x rtb         Ther Ex             Supine flex AAROM 10x5" 10x5" 5x5" 10x5"    10x5" 10x5" 10x5"   Pulley standing             Pt edu on plan of care, pathology, home program  3'      2' 2'    IR PROM with strap 5x10" 5x10" 5x10" 5x10"    5x10" 5x10" 5x5"   Table slides  10x           Wall slides   5x5" 10x5"         Wand ER AAROM 5x10"       5x10" 5x10" 5x10"   Wand ext AAROM 10x5" 10x5" 10x5" 10x5" 5x10" 10x5" 10x5"   Wand abd AAROM   5x5"          Cross body adduction 5x10" 5x10" 5x10" 5x10"    5x10" 5x10" 5x10"   Ther Activity                                       Gait Training                                       Modalities             Cold pack

## 2022-08-25 ENCOUNTER — OFFICE VISIT (OUTPATIENT)
Dept: PHYSICAL THERAPY | Facility: CLINIC | Age: 72
End: 2022-08-25
Payer: COMMERCIAL

## 2022-08-25 DIAGNOSIS — Z98.890 S/P RIGHT ROTATOR CUFF REPAIR: Primary | ICD-10-CM

## 2022-08-25 DIAGNOSIS — G89.29 CHRONIC RIGHT SHOULDER PAIN: ICD-10-CM

## 2022-08-25 DIAGNOSIS — M25.511 CHRONIC RIGHT SHOULDER PAIN: ICD-10-CM

## 2022-08-25 PROCEDURE — 97110 THERAPEUTIC EXERCISES: CPT | Performed by: PHYSICAL THERAPIST

## 2022-08-25 PROCEDURE — 97140 MANUAL THERAPY 1/> REGIONS: CPT | Performed by: PHYSICAL THERAPIST

## 2022-08-25 PROCEDURE — 97112 NEUROMUSCULAR REEDUCATION: CPT | Performed by: PHYSICAL THERAPIST

## 2022-08-25 NOTE — PROGRESS NOTES
Daily Note    Today's date: 2022  Patient name: Liza Weiss  : 1950  MRN: [id number]  Referring provider: Sid Jonas MD  Dx:   Encounter Diagnosis     ICD-10-CM    1  S/P right rotator cuff repair  Z98 890    2  Chronic right shoulder pain  M25 511     G89 29                   Subjective: Patient reports that she had follow up with surgeon and he was very pleased with her progress  She was given updated prescription and told no prescriptions but no lifting greater than 5lbs and use pain as guide when doing new activities  Objective: See treatment diary below      Assessment: Progressed shoulder strengthening per protocol with addition of rows and increase in sets for IR/ER  She noted appropriate muscle fatigue but no pain  Overall, patient is progressing well with therapy and requires continued skilled PT to further progress upon deficits and meet long term goals  Plan: Continue per plan of care, follow protocol  Progress Report next visit     Precautions: DOS 22  Follow protocol  EPOC 22         Manuals    Shoulder PROM flexion and ER EVELIO EVELIO EVELIO EVELIO EVELIO   EVELIO EVELIO EVELIO   STM UT and posterior shld EVELIO EVELIO  EVELIO EVELIO   EVELIO EVELIO EVELIO                Neuro Re-Ed             Rows with retraction     2x10 rtb        scap retraction 10x 10x       5x 10x   shld ext with retraction 2x10 rtb 2x10 rtb 2x10 rtb 2x10 rtb 3x10 rtb     10x rtb   ER with retraction   2x10 rtb 2x10 rtb 2x10 rtb        IR with retraction   2x10 rtb 15x rtb 2x10 rtb        Ther Ex             Supine flex AAROM 10x5" 10x5" 5x5" 10x5"    10x5" 10x5" 10x5"   Pulley standing             Pt edu on plan of care, pathology, home program  3'      2' 2'    IR PROM with strap 5x10" 5x10" 5x10" 5x10" 5x10"   5x10" 5x10" 5x5"   Wand flexion AAROM  10x   10x        Wall slides   5x5" 10x5" B/l 10x        Wand ER AAROM 5x10"       5x10" 5x10" 5x10"   Wand ext AAROM 10x5" 10x5" 10x5" 10x5" 10x5"   5x10" 10x5" 10x5"   Wand abd AAROM   5x5"  10x5"        Cross body adduction 5x10" 5x10" 5x10" 5x10" 5x10"   5x10" 5x10" 5x10"   Ther Activity                                       Gait Training                                       Modalities             Cold pack

## 2022-08-30 ENCOUNTER — OFFICE VISIT (OUTPATIENT)
Dept: PHYSICAL THERAPY | Facility: CLINIC | Age: 72
End: 2022-08-30
Payer: COMMERCIAL

## 2022-08-30 DIAGNOSIS — Z98.890 S/P RIGHT ROTATOR CUFF REPAIR: Primary | ICD-10-CM

## 2022-08-30 DIAGNOSIS — M25.511 CHRONIC RIGHT SHOULDER PAIN: ICD-10-CM

## 2022-08-30 DIAGNOSIS — G89.29 CHRONIC RIGHT SHOULDER PAIN: ICD-10-CM

## 2022-08-30 PROCEDURE — 97110 THERAPEUTIC EXERCISES: CPT | Performed by: PHYSICAL THERAPIST

## 2022-08-30 PROCEDURE — 97140 MANUAL THERAPY 1/> REGIONS: CPT | Performed by: PHYSICAL THERAPIST

## 2022-08-30 PROCEDURE — 97112 NEUROMUSCULAR REEDUCATION: CPT | Performed by: PHYSICAL THERAPIST

## 2022-08-30 NOTE — PROGRESS NOTES
Progress Report    Today's date: 2022  Patient name: Marcus Gonzalez  : 1950  MRN: [id number]  Referring provider: Maggie Worrell MD  Dx:   Encounter Diagnosis     ICD-10-CM    1  S/P right rotator cuff repair  Z98 890    2  Chronic right shoulder pain  M25 511     G89 29                   Subjective: Patient reports no soreness following last session  She feels that she is able to dress without difficulty but hasn't tried anything but button ups due to shoulder (I e  pullovers/tshirts)  She feels improvements each week and is able to use her right arm for cutting food up  She is still limited with lifting light objects overhead, and any moderate to heavy weights below shoulder height, for household management and food shopping  Objective: See treatment diary below    R shoulder A/PROM  Flexion 120/158 deg  Abduction 115/118 deg  ER T2/80 deg  IR L1/60 deg    submax MMT: shoulder IR/ER: 4/5 at least      Assessment: Patient has been treated in PT for 2 months following RTC on 22  She had recent follow up with surgeon and was given updated prescription for strengthening progression  Patient is tolerating well thus far  She has made good improvements in shoulder ROM but still has impairments compared to uninvolved side, as expected at this time post op  She is making functional improvements as well with bathing, brushing teeth, and meal preparation  Overall, patient is progressing well with therapy and requires continued skilled PT to further progress upon deficits and meet long term goals  Plan: Continue per plan of care, follow protocol  Precautions: DOS 22  Follow protocol  EPOC 22         Manuals        Shoulder PROM flexion and ER EVELIO EVELIO EVELIO EVELIO EVELIO EVELIO       STM UT and posterior shld EVELIO EVELIO  EVELIO EVELIO                     Neuro Re-Ed             Rows with retraction     2x10 rtb 2x10 gtb       scap retraction 10x 10x           shld ext with retraction 2x10 rtb 2x10 rtb 2x10 rtb 2x10 rtb 3x10 rtb 2x10 gtb       ER with retraction   2x10 rtb 2x10 rtb 2x10 rtb 2x10 rtb       IR with retraction   2x10 rtb 15x rtb 2x10 rtb 2x10 rtb       Ther Ex             Supine flex AAROM 10x5" 10x5" 5x5" 10x5"         Pulley standing             Pt edu on plan of care, pathology, home program  3'           IR PROM with strap 5x10" 5x10" 5x10" 5x10" 5x10"        Wand flexion AAROM  10x   10x 10x       Wall slides   5x5" 10x5" B/l 10x 10x       Wall circles      10x       Wand ER AAROM 5x10"            Wand ext AAROM 10x5" 10x5" 10x5" 10x5" 10x5" 10x       Wand abd AAROM   5x5"  10x5" 10x       Assessment associated with progress report      10'       Cross body adduction 5x10" 5x10" 5x10" 5x10" 5x10"        Ther Activity                                       Gait Training                                       Modalities             Cold pack

## 2022-09-01 ENCOUNTER — OFFICE VISIT (OUTPATIENT)
Dept: PHYSICAL THERAPY | Facility: CLINIC | Age: 72
End: 2022-09-01
Payer: COMMERCIAL

## 2022-09-01 DIAGNOSIS — Z98.890 S/P RIGHT ROTATOR CUFF REPAIR: Primary | ICD-10-CM

## 2022-09-01 DIAGNOSIS — M25.511 CHRONIC RIGHT SHOULDER PAIN: ICD-10-CM

## 2022-09-01 DIAGNOSIS — G89.29 CHRONIC RIGHT SHOULDER PAIN: ICD-10-CM

## 2022-09-01 PROCEDURE — 97140 MANUAL THERAPY 1/> REGIONS: CPT | Performed by: PHYSICAL THERAPIST

## 2022-09-01 PROCEDURE — 97110 THERAPEUTIC EXERCISES: CPT | Performed by: PHYSICAL THERAPIST

## 2022-09-01 PROCEDURE — 97112 NEUROMUSCULAR REEDUCATION: CPT | Performed by: PHYSICAL THERAPIST

## 2022-09-01 NOTE — PROGRESS NOTES
Daily Note    Today's date: 2022  Patient name: Daryn Justin  : 1950  MRN: [id number]  Referring provider: Adriana Witt MD  Dx:   Encounter Diagnosis     ICD-10-CM    1  S/P right rotator cuff repair  Z98 890    2  Chronic right shoulder pain  M25 511     G89 29                   Subjective: Patient reports only mid soreness following last session  Overall, she reports that her shoulder is feeling good  She hasn't tried pull over shirt lately  Objective: See treatment diary below      Assessment: Patient continues to have soreness and difficulty with IR stretch  Progressed RTC strengthening with increase in sets for ER and resistance for IR  Patient is progressing well with therapy and requires continued skilled PT to further progress upon deficits and meet long term goals  Plan: Continue per plan of care, follow protocol  Precautions: DOS 22  Follow protocol  EPOC 22         Manuals       Shoulder PROM flexion and ER EVELIO EVELIO EVELIO EVELIO EVELIO EVELIO EVELIO      STM UT and posterior shld EVELIO EVELIO  EVELIO EVELIO                     Neuro Re-Ed             Rows with retraction     2x10 rtb 2x10 gtb       scap retraction 10x 10x           shld ext with retraction 2x10 rtb 2x10 rtb 2x10 rtb 2x10 rtb 3x10 rtb 2x10 gtb 3x10 gtb      ER with retraction   2x10 rtb 2x10 rtb 2x10 rtb 2x10 rtb 3x10 rtb      IR with retraction   2x10 rtb 15x rtb 2x10 rtb 2x10 rtb 2x10 gtb      Ther Ex             Supine flex AAROM 10x5" 10x5" 5x5" 10x5"         Pulley standing             Pt edu on plan of care, pathology, home program  3'           IR PROM with strap 5x10" 5x10" 5x10" 5x10" 5x10"  5x10"      Wand flexion AAROM  10x   10x 10x       Wall slides   5x5" 10x5" B/l 10x 10x 10x      Wall circles      10x 10 ea      Wand ER AAROM 5x10"            Wand ext AAROM 10x5" 10x5" 10x5" 10x5" 10x5" 10x       Wand abd AAROM   5x5"  10x5" 10x       Cross body adduction 5x10" 5x10" 5x10" 5x10" 5x10" 3x15"      Ther Activity                                       Gait Training                                       Modalities             Cold pack

## 2022-09-02 ENCOUNTER — OFFICE VISIT (OUTPATIENT)
Dept: PULMONOLOGY | Facility: HOSPITAL | Age: 72
End: 2022-09-02
Payer: COMMERCIAL

## 2022-09-02 VITALS
HEART RATE: 70 BPM | TEMPERATURE: 97.9 F | BODY MASS INDEX: 21.71 KG/M2 | OXYGEN SATURATION: 97 % | DIASTOLIC BLOOD PRESSURE: 74 MMHG | SYSTOLIC BLOOD PRESSURE: 120 MMHG | HEIGHT: 61 IN | WEIGHT: 115 LBS

## 2022-09-02 DIAGNOSIS — J45.50 SEVERE PERSISTENT ASTHMA WITHOUT COMPLICATION: Primary | ICD-10-CM

## 2022-09-02 PROCEDURE — 99215 OFFICE O/P EST HI 40 MIN: CPT | Performed by: INTERNAL MEDICINE

## 2022-09-02 RX ORDER — PSEUDOEPHED/ACETAMINOPH/DIPHEN 30MG-500MG
TABLET ORAL
COMMUNITY
Start: 2022-06-09

## 2022-09-02 NOTE — ASSESSMENT & PLAN NOTE
Nicolás Welsh is stable with regards to her breathing will maintain on Breo once a day  She has a rescue inhaler to use as needed  It seems that with her twice a day sucralfate under control her acid reflux her asthma is doing quite well  I would maintain on all her current therapy

## 2022-09-02 NOTE — PROGRESS NOTES
Assessment/Plan:    Severe persistent asthma without complication  Luigi Morillo is stable with regards to her breathing will maintain on Breo once a day  She has a rescue inhaler to use as needed  It seems that with her twice a day sucralfate under control her acid reflux her asthma is doing quite well  I would maintain on all her current therapy  Diagnoses and all orders for this visit:    Severe persistent asthma without complication    Other orders  -     Acetaminophen Extra Strength 500 MG tablet          Subjective:      Patient ID: Sidney Wright is a 74 y o  female  Luigi Morillo has been doing well without any significant asthma complaints  She has been trying to titrate down her acid reflux medication but whenever she comes down on her asthma acts up  This point she is only maintaining on sucralfate twice a day no PPI or H2 blocker  primary symptoms  Associated symptoms include chest pain  Pertinent negatives include no fever, headaches, myalgias or sore throat  The following portions of the patient's history were reviewed and updated as appropriate: allergies, current medications, past family history, past medical history, past social history, past surgical history and problem list     Review of Systems   Constitutional: Negative for appetite change and fever  HENT: Positive for postnasal drip  Negative for ear pain, rhinorrhea, sneezing, sore throat and trouble swallowing  Respiratory: Positive for shortness of breath  Cardiovascular: Positive for chest pain  Musculoskeletal: Negative for myalgias  Neurological: Negative for headaches  Objective:      /74 (BP Location: Left arm, Patient Position: Sitting)   Pulse 70   Temp 97 9 °F (36 6 °C)   Ht 5' 0 5" (1 537 m)   Wt 52 2 kg (115 lb)   SpO2 97%   BMI 22 09 kg/m²          Physical Exam  Constitutional:       Appearance: She is well-developed  HENT:      Head: Normocephalic     Eyes:      Pupils: Pupils are equal, round, and reactive to light  Cardiovascular:      Rate and Rhythm: Normal rate  Heart sounds: No murmur heard  Pulmonary:      Effort: Pulmonary effort is normal  No respiratory distress  Breath sounds: Normal breath sounds  No wheezing or rales  Abdominal:      Palpations: Abdomen is soft  Musculoskeletal:         General: Normal range of motion  Cervical back: Normal range of motion and neck supple  Skin:     General: Skin is warm and dry  Neurological:      Mental Status: She is alert and oriented to person, place, and time           Answers for HPI/ROS submitted by the patient on 8/30/2022  Do you have chest tightness?: Yes  Do you experience frequent throat clearing?: Yes  Chronicity: recurrent  When did you first notice your symptoms?: in the past 7 days  How often do your symptoms occur?: intermittently  Since you first noticed this problem, how has it changed?: gradually improving  Do you have shortness of breath that occurs with effort or exertion?: Yes  Do you have ear congestion?: Yes  Do you have heartburn?: No  Do you have fatigue?: Yes  Do you have nasal congestion?: No  Do you have shortness of breath when lying flat?: No  Do you have shortness of breath when you wake up?: No  Do you have sweats?: No  Have you experienced weight loss?: No  Which of the following makes your symptoms worse?: exposure to fumes, exposure to smoke, strenuous activity  Which of the following makes your symptoms better?: nothing

## 2022-09-06 ENCOUNTER — OFFICE VISIT (OUTPATIENT)
Dept: PHYSICAL THERAPY | Facility: CLINIC | Age: 72
End: 2022-09-06
Payer: COMMERCIAL

## 2022-09-06 DIAGNOSIS — G89.29 CHRONIC RIGHT SHOULDER PAIN: ICD-10-CM

## 2022-09-06 DIAGNOSIS — Z98.890 S/P RIGHT ROTATOR CUFF REPAIR: Primary | ICD-10-CM

## 2022-09-06 DIAGNOSIS — M25.511 CHRONIC RIGHT SHOULDER PAIN: ICD-10-CM

## 2022-09-06 PROCEDURE — 97140 MANUAL THERAPY 1/> REGIONS: CPT | Performed by: PHYSICAL THERAPIST

## 2022-09-06 PROCEDURE — 97112 NEUROMUSCULAR REEDUCATION: CPT | Performed by: PHYSICAL THERAPIST

## 2022-09-06 PROCEDURE — 97110 THERAPEUTIC EXERCISES: CPT | Performed by: PHYSICAL THERAPIST

## 2022-09-06 NOTE — PROGRESS NOTES
Daily Note    Today's date: 2022  Patient name: He Hernandez  : 1950  MRN: [id number]  Referring provider: Loco Sanchez MD  Dx:   Encounter Diagnosis     ICD-10-CM    1  S/P right rotator cuff repair  Z98 890    2  Chronic right shoulder pain  M25 511     G89 29                   Subjective: Patient reports that she was able to pull loose fitting pullover on without difficulty, but she had some trouble with normal fitting tshirt  Objective: See treatment diary below  T12 IR AROM      Assessment: Patient demonstrates improvement in IR AROM today and less restrictions with flex/abduction PROM  Progressed shoulder girdle strengthening with addition of shoulder flexion with dumbbell  Patient is progressing well with therapy and requires continued skilled PT to further progress upon deficits and meet long term goals  Plan: Continue per plan of care, follow protocol  Precautions: DOS 22  Follow protocol  EPOC 22         Manuals      Shoulder PROM flexion and ER EVELIO EVELIO EVELIO EVELIO EVELIO EVELIO EVELIO EVELIO     STM UT and posterior shld EVELIO EVELIO  EVELIO EVELIO                     Neuro Re-Ed             Rows with retraction     2x10 rtb 2x10 gtb       scap retraction 10x 10x           shld ext with retraction 2x10 rtb 2x10 rtb 2x10 rtb 2x10 rtb 3x10 rtb 2x10 gtb 3x10 gtb 3x10 palms fwd rtb     ER with retraction   2x10 rtb 2x10 rtb 2x10 rtb 2x10 rtb 3x10 rtb 3x10 rtb     IR with retraction   2x10 rtb 15x rtb 2x10 rtb 2x10 rtb 2x10 gtb 2x10 gtb     Ther Ex             Supine flex AAROM 10x5" 10x5" 5x5" 10x5"         Pulley standing             Pt edu on plan of care, pathology, home program  3'           IR PROM with strap 5x10" 5x10" 5x10" 5x10" 5x10"  5x10" 5x10"     Wand flexion AAROM  10x   10x 10x  10x     Wall slides   5x5" 10x5" B/l 10x 10x 10x 10x     Wall circles      10x 10 ea 10 ea     Wand ext AAROM 10x5" 10x5" 10x5" 10x5" 10x5" 10x       Wand abd AAROM   5x5"  10x5" 10x  10x     shld flex dumbbell        10x 1#     Cross body adduction 5x10" 5x10" 5x10" 5x10" 5x10"  3x15"      Ther Activity                                       Gait Training                                       Modalities             Cold pack

## 2022-09-07 DIAGNOSIS — J45.50 SEVERE PERSISTENT ASTHMA WITHOUT COMPLICATION: Primary | ICD-10-CM

## 2022-09-07 DIAGNOSIS — M95.8 ABDOMINAL WALL DEFECT, ACQUIRED: ICD-10-CM

## 2022-09-08 ENCOUNTER — OFFICE VISIT (OUTPATIENT)
Dept: PHYSICAL THERAPY | Facility: CLINIC | Age: 72
End: 2022-09-08
Payer: COMMERCIAL

## 2022-09-08 DIAGNOSIS — G89.29 CHRONIC RIGHT SHOULDER PAIN: ICD-10-CM

## 2022-09-08 DIAGNOSIS — Z98.890 S/P RIGHT ROTATOR CUFF REPAIR: Primary | ICD-10-CM

## 2022-09-08 DIAGNOSIS — M25.511 CHRONIC RIGHT SHOULDER PAIN: ICD-10-CM

## 2022-09-08 PROCEDURE — 97110 THERAPEUTIC EXERCISES: CPT | Performed by: PHYSICAL THERAPIST

## 2022-09-08 PROCEDURE — 97140 MANUAL THERAPY 1/> REGIONS: CPT | Performed by: PHYSICAL THERAPIST

## 2022-09-08 PROCEDURE — 97112 NEUROMUSCULAR REEDUCATION: CPT | Performed by: PHYSICAL THERAPIST

## 2022-09-08 NOTE — PROGRESS NOTES
Daily Note    Today's date: 2022  Patient name: Daryn Justin  : 1950  MRN: [id number]  Referring provider: Adriana Witt MD  Dx:   Encounter Diagnosis     ICD-10-CM    1  S/P right rotator cuff repair  Z98 890    2  Chronic right shoulder pain  M25 511     G89 29                   Subjective: Patient reports that she has some soreness today in her shoulder, and she thinks it is from using it too much yesterday and today  Objective: See treatment diary below      Assessment: Patient presents with increased trigger points along upper trap and infraspinatus  She noted less shoulder pain following STM and trigger point release  Instructed patient on self trigger point release at home as needed with tennis ball  Progressed scapular strengthening with addition of prone unilateral shoulder extension (I's)  Patient is progressing well with therapy and requires continued skilled PT to further progress upon deficits and meet long term goals  Plan: Continue per plan of care, follow protocol  Precautions: DOS 22  Follow protocol  EPOC 22         Manuals     Shoulder PROM flexion and ER EVELIO EVELIO EVELIO EVELIO EVELIO EVELIO EVELIO EVELIO EVELIO    STM UT and posterior shld EVELIO EVELIO  EVELIO EVELIO    EVELIO and TPR                 Neuro Re-Ed             Rows with retraction     2x10 rtb 2x10 gtb       Prone UL I         10x5"    shld ext with retraction 2x10 rtb 2x10 rtb 2x10 rtb 2x10 rtb 3x10 rtb 2x10 gtb 3x10 gtb 3x10 palms fwd rtb 3x10 palms fwd rtb    ER with retraction   2x10 rtb 2x10 rtb 2x10 rtb 2x10 rtb 3x10 rtb 3x10 rtb 3x10 rtb    IR with retraction   2x10 rtb 15x rtb 2x10 rtb 2x10 rtb 2x10 gtb 2x10 gtb 3x10 gtb    Ther Ex             Supine flex AAROM 10x5" 10x5" 5x5" 10x5"         Pulley standing             Pt edu on plan of care, pathology, home program  3'           IR PROM with strap 5x10" 5x10" 5x10" 5x10" 5x10"  5x10" 5x10" 10x10"    Wall flex stretch         5x10"    Wand flexion AAROM  10x   10x 10x  10x     Wall slides   5x5" 10x5" B/l 10x 10x 10x 10x 5x    Wall circles      10x 10 ea 10 ea 5 ea    Wand ext AAROM 10x5" 10x5" 10x5" 10x5" 10x5" 10x       Wand abd AAROM   5x5"  10x5" 10x  10x     shld abd dumbbell         10x 1#    shld flex dumbbell        10x 1# 10x 1#    Cross body adduction 5x10" 5x10" 5x10" 5x10" 5x10"  3x15"  3x15"    Ther Activity                                       Gait Training                                       Modalities             Cold pack

## 2022-09-13 ENCOUNTER — EVALUATION (OUTPATIENT)
Dept: PHYSICAL THERAPY | Facility: CLINIC | Age: 72
End: 2022-09-13
Payer: COMMERCIAL

## 2022-09-13 DIAGNOSIS — Z98.890 S/P RIGHT ROTATOR CUFF REPAIR: ICD-10-CM

## 2022-09-13 DIAGNOSIS — M25.511 CHRONIC RIGHT SHOULDER PAIN: ICD-10-CM

## 2022-09-13 DIAGNOSIS — M95.8: Primary | ICD-10-CM

## 2022-09-13 DIAGNOSIS — G89.29 CHRONIC RIGHT SHOULDER PAIN: ICD-10-CM

## 2022-09-13 PROCEDURE — 97110 THERAPEUTIC EXERCISES: CPT | Performed by: PHYSICAL THERAPIST

## 2022-09-13 NOTE — LETTER
2022    Dottie Hutchins MD  [address] [place] [address]    Patient: Deuce Gaitan   YOB: 1950   Date of Visit: 2022     Encounter Diagnosis     ICD-10-CM    1  Acquired deformity of the abdomen  M95 8    2  S/P right rotator cuff repair  Z98 890    3  Chronic right shoulder pain  M25 511     G89 29        Dear Dr Hira Beltre: Thank you for your recent referral of Deuce Gaitan  Please review the attached evaluation summary from [facility]'s recent visit  Please verify that you agree with the plan of care by signing the attached order  If you have any questions or concerns, please do not hesitate to call  I sincerely appreciate the opportunity to share in the care of one of your patients and hope to have another opportunity to work with you in the near future  Sincerely,    ERLINDA Olson, PT      Referring Provider:      I certify that I have read the below Plan of Care and certify the need for these services furnished under this plan of treatment while under my care  Dottie Hutchins MD  [address] [place] [address]  Via Fax: [phone]          PT Evaluation     Today's date: 2022  Patient name: Deuce Gaitan  : 1950  MRN: [id number]  Referring provider: Jorge Velez MD  Dx:   Encounter Diagnosis     ICD-10-CM    1  Acquired deformity of the abdomen  M95 8    2  S/P right rotator cuff repair  Z98 890    3  Chronic right shoulder pain  M25 511     G89 29        Start Time: 1330  Stop Time: 1415  Total time in clinic (min): 45 minutes    Assessment  Assessment details: RE-EVALUATION 22: Patient was evaluated today for abdominal wall strain with prescription from her pulmonologist  Patient presents with decreased lumbar spine ROM, but good core muscle activation and strength based on testing today   Core endurance and posterior lumbar musculature was not assessed yet due to pain provocation last session after lying prone  Symptoms indicate some weakness or lack of core stability with dynamic movements like changing position, dressing, or using B/L's when shaking things dry for completion of household chores  Patient would benefit from skilled PT to improve core strength, motor control, and lumbar mobility as these may be contributing to abdominal muscle spasms  Patient was also re-evaluated today for her right shoulder following RTC repair which she has been treated for 20 visits over the past 2 5 months  Treatment has consisted of manual therapy to improve ROM, therapeutic exercises to improve UE strength and flexibility per protocol, and patient education on home program  From an objective standpoint, patient has demonstrated progress in shoulder/RTC strength  She has also improved active and passive ROM, but moderate restrictions still exist  Functionally, patient is able to performing bathing/dressing ADLs with less difficulty, sleep without significant disturbance, and perform light household chores  Patient is still limited with reaching overhead or any moderate to heavy lifting  Patient would benefit from continued skilled PT over the next 8 weeks to further progress upon deficits and return patient to prior level of function  Impairments: abnormal or restricted ROM, impaired physical strength, lacks appropriate home exercise program, pain with function, scapular dyskinesis and poor posture     Symptom irritability: moderateUnderstanding of Dx/Px/POC: good   Prognosis: good    Goals  Short term goals:  Patient is independent in home program to support plan of care and improve function  - 2 weeks  Patient demonstrates 130 deg  Shoulder flexion PROM so she can get dressed with less pain  - 3 weeks  Patient can tolerate PT session for abdominal wall strain without provocation of symptoms   2 weeks    Long term goals:  Patient demonstrates improvement in community participation with increase in FOTO score by 20%  - 12 weeks  Patient demonstrates at least 75% shoulder AROM for less difficulty reaching overhead and completing ADLs  - 12 weeks  Patient demonstrates at least 4+/5 shoulder strength so she can perform yard work without pain  - 12 weeks  Patient can sleep without waking from pain  - 6 weeks   Patient can perform household cleaning with b/l UE's without provocation of abdominal pain  4 weeks          Plan  Patient would benefit from: skilled physical therapy  Planned modality interventions: cryotherapy  Planned therapy interventions: activity modification, ADL training, body mechanics training, flexibility, functional ROM exercises, home exercise program, therapeutic exercise, therapeutic activities, stretching, strengthening, patient education, neuromuscular re-education, massage, manual therapy and joint mobilization  Frequency: 2x week  Duration in weeks: 8  Plan of Care beginning date: 6/21/2022  Plan of Care expiration date: 11/11/2022  Treatment plan discussed with: patient        Subjective Evaluation    History of Present Illness  Date of onset: 3/1/2022  Date of surgery: 6/9/2022  Mechanism of injury: RE-EVALUATION 9/13/22  Abdominal wall strain: Patient received PT prescription from pulmonologist for abdominal wall strain  Patient sees pulmonologist every 4 months for management of asthma  She reports episodes of severe abdominal pain often after dynamic trunk movements, leading to muscle spasms  She had testing done to rule out cardiac origin after first episode of this approximately 1 year ago  Symptoms typically last 4 days and lead to patient having to use inhaler from SOB accompanying pain  SOB improve with rest and use of inhaler  Recently patient has had abdominal pain following shaking toaster clean, shaking dishes dry, leaning over when dressing, and last week after lying prone in therapy (which also caused LBP)    Additionally, patient has several year history of increased asthma symptoms when wearing any tight fitting clothing or bra  Many year history of LBP with prolonged standing  Shoulder: Patient reports good improvement in shoulder, still mild shoulder pain with too much use for ADLs or when dressing  She still has strength limitations when lifting moderate to heavy objects, I e   putting dishes overhead into cabinet  Patient has prescription for PT following RTC repair on 22  Patient reports 10 year history of shoulder pain, especially when throwing a ball to grandchild  She noted gradual worsening over the past few months  She saw orthopedist and elected to have surgery  She saw surgeon 1 week post op and next follow up 22  Patient was told that she could drive when she felt ready and sleep without sling  Sling for 6 weeks    Symptoms: R shoulder weakness, some difficulty dressing and bathing, and is unable to reach overhead or lift any weight  She is unable to floss teeth currently       PMH: severe asthma   Pain  Current pain ratin  At best pain ratin  At worst pain rating: 10 (for LBP last week, 5/10 abdominal pain)    Patient Goals  Patient goals for therapy: decreased pain and increased motion          Objective     Active Range of Motion   Left Shoulder   Flexion: 150 degrees   Abduction: 160 degrees   External rotation BTH: T2   Internal rotation BTB: T7     Right Shoulder   Flexion: 124 degrees   Abduction: 125 degrees   External rotation BTH: T3   Internal rotation BTB: T12     Lumbar   Flexion:  Restriction level: minimal  Extension:  Restriction level: moderate  Left lateral flexion:  WFL  Right lateral flexion:  WFL  Left rotation:  Restriction level: minimal  Right rotation:  Restriction level: minimal    Passive Range of Motion     Right Shoulder   Flexion: 148 degrees   Abduction: 115 degrees   External rotation 45°: 70 degrees   Internal rotation 45°: 65 degrees     Right Elbow   Flexion: WFL  Extension: WFL  Forearm supination: 70 degrees   Forearm pronation: 80 degrees     Strength/Myotome Testing     Left Shoulder     Planes of Motion   Flexion: 4+   Abduction: 4+   External rotation at 45°: 5   Internal rotation at 45°: 5     Right Shoulder     Planes of Motion   Flexion: 4+   Abduction: 4+   External rotation at 45°: 5   Internal rotation at 45°: 5     Additional Strength Details  S    Muscle Activation     Additional Muscle Activation Details  Core muscle assessment  Transverse abdominus: good  Posterior pelvic tilt: good  Kegel: able to perform  Abdominal curl up: at least 4/5 no provocation of pain  Bridge: good symmetry, able  Seated trunk perturbations: able to tolerate strong resistance with pain  No diastasis recti detected  Diaphragmatic breathing: able             Precautions: DOS 6/9/22  Follow protocol  EPOC 11/11/22         Manuals 8/11 8/16 8/18 8/23 8/25 8/30 9/1 9/6 9/8 9/13   Shoulder PROM flexion and ER EVELIO EVELIO EVELIO EVELIO EVELIO EVELIO EVELIO EVELIO EVELIO    STM UT and posterior shld EVELIO EVELIO  EVELIO EVELIO    EVELIO and TPR                 Neuro Re-Ed             Rows with retraction     2x10 rtb 2x10 gtb       Prone UL I         10x5"    shld ext with retraction 2x10 rtb 2x10 rtb 2x10 rtb 2x10 rtb 3x10 rtb 2x10 gtb 3x10 gtb 3x10 palms fwd rtb 3x10 palms fwd rtb    ER with retraction   2x10 rtb 2x10 rtb 2x10 rtb 2x10 rtb 3x10 rtb 3x10 rtb 3x10 rtb    IR with retraction   2x10 rtb 15x rtb 2x10 rtb 2x10 rtb 2x10 gtb 2x10 gtb 3x10 gtb    Ther Ex             Assessment associated with re-eval          30'   Supine flex AAROM 10x5" 10x5" 5x5" 10x5"         Pulley standing             Pt edu on plan of care, pathology, home program  3'        5'   IR PROM with strap 5x10" 5x10" 5x10" 5x10" 5x10"  5x10" 5x10" 10x10"    Wall flex stretch         5x10" 5x10"   Wand flexion AAROM  10x   10x 10x  10x     Wall slides   5x5" 10x5" B/l 10x 10x 10x 10x 5x    Wall circles      10x 10 ea 10 ea 5 ea    Wand ext AAROM 10x5" 10x5" 10x5" 10x5" 10x5" 10x       Wand abd AAROM   5x5"  10x5" 10x  10x     shld abd dumbbell         10x 1#    shld flex dumbbell        10x 1# 10x 1#    Cross body adduction 5x10" 5x10" 5x10" 5x10" 5x10"  3x15"  3x15" 3x15"   Ther Activity                                       Gait Training                                       Modalities             Cold pack

## 2022-09-13 NOTE — PROGRESS NOTES
PT Evaluation     Today's date: 2022  Patient name: Marcus Gonzalez  : 1950  MRN: [id number]  Referring provider: Maggie Worrell MD  Dx:   Encounter Diagnosis     ICD-10-CM    1  Acquired deformity of the abdomen  M95 8    2  S/P right rotator cuff repair  Z98 890    3  Chronic right shoulder pain  M25 511     G89 29        Start Time: 1330  Stop Time: 1415  Total time in clinic (min): 45 minutes    Assessment  Assessment details: RE-EVALUATION 22: Patient was evaluated today for abdominal wall strain with prescription from her pulmonologist  Patient presents with decreased lumbar spine ROM, but good core muscle activation and strength based on testing today  Core endurance and posterior lumbar musculature was not assessed yet due to pain provocation last session after lying prone  Symptoms indicate some weakness or lack of core stability with dynamic movements like changing position, dressing, or using B/L's when shaking things dry for completion of household chores  Patient would benefit from skilled PT to improve core strength, motor control, and lumbar mobility as these may be contributing to abdominal muscle spasms  Patient was also re-evaluated today for her right shoulder following RTC repair which she has been treated for 20 visits over the past 2 5 months  Treatment has consisted of manual therapy to improve ROM, therapeutic exercises to improve UE strength and flexibility per protocol, and patient education on home program  From an objective standpoint, patient has demonstrated progress in shoulder/RTC strength  She has also improved active and passive ROM, but moderate restrictions still exist  Functionally, patient is able to performing bathing/dressing ADLs with less difficulty, sleep without significant disturbance, and perform light household chores  Patient is still limited with reaching overhead or any moderate to heavy lifting   Patient would benefit from continued skilled PT over the next 8 weeks to further progress upon deficits and return patient to prior level of function  Impairments: abnormal or restricted ROM, impaired physical strength, lacks appropriate home exercise program, pain with function, scapular dyskinesis and poor posture     Symptom irritability: moderateUnderstanding of Dx/Px/POC: good   Prognosis: good    Goals  Short term goals:  Patient is independent in home program to support plan of care and improve function  - 2 weeks  Patient demonstrates 130 deg  Shoulder flexion PROM so she can get dressed with less pain  - 3 weeks  Patient can tolerate PT session for abdominal wall strain without provocation of symptoms  2 weeks    Long term goals:  Patient demonstrates improvement in community participation with increase in FOTO score by 20%  - 12 weeks  Patient demonstrates at least 75% shoulder AROM for less difficulty reaching overhead and completing ADLs  - 12 weeks  Patient demonstrates at least 4+/5 shoulder strength so she can perform yard work without pain  - 12 weeks  Patient can sleep without waking from pain  - 6 weeks   Patient can perform household cleaning with b/l UE's without provocation of abdominal pain   4 weeks          Plan  Patient would benefit from: skilled physical therapy  Planned modality interventions: cryotherapy  Planned therapy interventions: activity modification, ADL training, body mechanics training, flexibility, functional ROM exercises, home exercise program, therapeutic exercise, therapeutic activities, stretching, strengthening, patient education, neuromuscular re-education, massage, manual therapy and joint mobilization  Frequency: 2x week  Duration in weeks: 8  Plan of Care beginning date: 6/21/2022  Plan of Care expiration date: 11/11/2022  Treatment plan discussed with: patient        Subjective Evaluation    History of Present Illness  Date of onset: 3/1/2022  Date of surgery: 6/9/2022  Mechanism of injury: RE-EVALUATION 22  Abdominal wall strain: Patient received PT prescription from pulmonologist for abdominal wall strain  Patient sees pulmonologist every 4 months for management of asthma  She reports episodes of severe abdominal pain often after dynamic trunk movements, leading to muscle spasms  She had testing done to rule out cardiac origin after first episode of this approximately 1 year ago  Symptoms typically last 4 days and lead to patient having to use inhaler from SOB accompanying pain  SOB improve with rest and use of inhaler  Recently patient has had abdominal pain following shaking toaster clean, shaking dishes dry, leaning over when dressing, and last week after lying prone in therapy (which also caused LBP)  Additionally, patient has several year history of increased asthma symptoms when wearing any tight fitting clothing or bra  Many year history of LBP with prolonged standing  Shoulder: Patient reports good improvement in shoulder, still mild shoulder pain with too much use for ADLs or when dressing  She still has strength limitations when lifting moderate to heavy objects, I e   putting dishes overhead into cabinet  Patient has prescription for PT following RTC repair on 22  Patient reports 10 year history of shoulder pain, especially when throwing a ball to grandchild  She noted gradual worsening over the past few months  She saw orthopedist and elected to have surgery  She saw surgeon 1 week post op and next follow up 22  Patient was told that she could drive when she felt ready and sleep without sling  Sling for 6 weeks    Symptoms: R shoulder weakness, some difficulty dressing and bathing, and is unable to reach overhead or lift any weight  She is unable to floss teeth currently       PMH: severe asthma   Pain  Current pain ratin  At best pain ratin  At worst pain rating: 10 (for LBP last week, 5/10 abdominal pain)    Patient Goals  Patient goals for therapy: decreased pain and increased motion          Objective     Active Range of Motion   Left Shoulder   Flexion: 150 degrees   Abduction: 160 degrees   External rotation BTH: T2   Internal rotation BTB: T7     Right Shoulder   Flexion: 124 degrees   Abduction: 125 degrees   External rotation BTH: T3   Internal rotation BTB: T12     Lumbar   Flexion:  Restriction level: minimal  Extension:  Restriction level: moderate  Left lateral flexion:  WFL  Right lateral flexion:  WFL  Left rotation:  Restriction level: minimal  Right rotation:  Restriction level: minimal    Passive Range of Motion     Right Shoulder   Flexion: 148 degrees   Abduction: 115 degrees   External rotation 45°: 70 degrees   Internal rotation 45°: 65 degrees     Right Elbow   Flexion: WFL  Extension: WFL  Forearm supination: 70 degrees   Forearm pronation: 80 degrees     Strength/Myotome Testing     Left Shoulder     Planes of Motion   Flexion: 4+   Abduction: 4+   External rotation at 45°: 5   Internal rotation at 45°: 5     Right Shoulder     Planes of Motion   Flexion: 4+   Abduction: 4+   External rotation at 45°: 5   Internal rotation at 45°: 5     Additional Strength Details  S    Muscle Activation     Additional Muscle Activation Details  Core muscle assessment  Transverse abdominus: good  Posterior pelvic tilt: good  Kegel: able to perform  Abdominal curl up: at least 4/5 no provocation of pain  Bridge: good symmetry, able  Seated trunk perturbations: able to tolerate strong resistance with pain  No diastasis recti detected  Diaphragmatic breathing: able             Precautions: DOS 6/9/22  Follow protocol  EPOC 11/11/22         Manuals 8/11 8/16 8/18 8/23 8/25 8/30 9/1 9/6 9/8 9/13   Shoulder PROM flexion and ER EVELIO EVELIO EVELIO EVELIO EVELIO EVELIO EVELIO EVELIO EVELIO    STM UT and posterior shld EVELIO EVELIO  EVELIO EVELIO    EVELIO and TPR                 Neuro Re-Ed             Rows with retraction     2x10 rtb 2x10 gtb       Prone UL I         10x5"    shld ext with retraction 2x10 rtb 2x10 rtb 2x10 rtb 2x10 rtb 3x10 rtb 2x10 gtb 3x10 gtb 3x10 palms fwd rtb 3x10 palms fwd rtb    ER with retraction   2x10 rtb 2x10 rtb 2x10 rtb 2x10 rtb 3x10 rtb 3x10 rtb 3x10 rtb    IR with retraction   2x10 rtb 15x rtb 2x10 rtb 2x10 rtb 2x10 gtb 2x10 gtb 3x10 gtb    Ther Ex             Assessment associated with re-eval          30'   Supine flex AAROM 10x5" 10x5" 5x5" 10x5"         Pulley standing             Pt edu on plan of care, pathology, home program  3'        5'   IR PROM with strap 5x10" 5x10" 5x10" 5x10" 5x10"  5x10" 5x10" 10x10"    Wall flex stretch         5x10" 5x10"   Wand flexion AAROM  10x   10x 10x  10x     Wall slides   5x5" 10x5" B/l 10x 10x 10x 10x 5x    Wall circles      10x 10 ea 10 ea 5 ea    Wand ext AAROM 10x5" 10x5" 10x5" 10x5" 10x5" 10x       Wand abd AAROM   5x5"  10x5" 10x  10x     shld abd dumbbell         10x 1#    shld flex dumbbell        10x 1# 10x 1#    Cross body adduction 5x10" 5x10" 5x10" 5x10" 5x10"  3x15"  3x15" 3x15"   Ther Activity                                       Gait Training                                       Modalities             Cold pack

## 2022-09-14 PROCEDURE — 97164 PT RE-EVAL EST PLAN CARE: CPT | Performed by: PHYSICAL THERAPIST

## 2022-09-15 ENCOUNTER — OFFICE VISIT (OUTPATIENT)
Dept: PHYSICAL THERAPY | Facility: CLINIC | Age: 72
End: 2022-09-15
Payer: COMMERCIAL

## 2022-09-15 DIAGNOSIS — M95.8: Primary | ICD-10-CM

## 2022-09-15 DIAGNOSIS — G89.29 CHRONIC RIGHT SHOULDER PAIN: ICD-10-CM

## 2022-09-15 DIAGNOSIS — M25.511 CHRONIC RIGHT SHOULDER PAIN: ICD-10-CM

## 2022-09-15 DIAGNOSIS — Z98.890 S/P RIGHT ROTATOR CUFF REPAIR: ICD-10-CM

## 2022-09-15 PROCEDURE — 97112 NEUROMUSCULAR REEDUCATION: CPT | Performed by: PHYSICAL THERAPIST

## 2022-09-15 PROCEDURE — 97140 MANUAL THERAPY 1/> REGIONS: CPT | Performed by: PHYSICAL THERAPIST

## 2022-09-15 PROCEDURE — 97110 THERAPEUTIC EXERCISES: CPT | Performed by: PHYSICAL THERAPIST

## 2022-09-15 NOTE — PROGRESS NOTES
Daily Note    Today's date: 9/15/2022  Patient name: Ulysses Tiwari  : 1950  MRN: [id number]  Referring provider: Geneva Landa MD  Dx:   Encounter Diagnosis     ICD-10-CM    1  Acquired deformity of the abdomen  M95 8    2  S/P right rotator cuff repair  Z98 890    3  Chronic right shoulder pain  M25 511     G89 29                   Subjective: Patient reports that she felt fine following evaluation of abdominal wall and re-evaluation of shoulder  She notes some low back pain this morning however and mild abdominal pain  Objective: See treatment diary below      Assessment: Initiated core stability training with transverse abdominus contractions with and without exhale, and diaphragmatic breathing  Patient required cuing for correct muscle recruitment but performed without pain  Held prone shoulder exercises to avoid low back pain  She tolerated all shoulder exercises well  Patient is progressing well with therapy and requires continued skilled PT to further progress upon deficits and meet long term goals  Plan: Continue per plan of care, follow protocol  Precautions: DOS 22  Follow protocol  EPOC 22         Manuals 9/15     8/30 9/1 9/6 9/8 9/13   Shoulder PROM flexion and ER EVELIO     EVELIO EVELIO EVELIO EVELIO    STM UT and posterior shld EVELIO        EVELIO and TPR                 Neuro Re-Ed             Rows with retraction      2x10 gtb       Prone UL I         10x5"    shld ext w/ retraction and core brace 2x10 rtb     2x10 gtb 3x10 gtb 3x10 palms fwd rtb 3x10 palms fwd rtb    ER with retraction 3x10 rtb     2x10 rtb 3x10 rtb 3x10 rtb 3x10 rtb    IR with retraction 3x10 rtb     2x10 rtb 2x10 gtb 2x10 gtb 3x10 gtb    Transverse ab bracing 10x            Brace with exhale 10x            Diaphragmatic breathing 10x            Ther Ex             Supine flex AAROM             Chemo standing             Pt edu on plan of care, pathology, home program          5'   IR PROM with strap 5x10"      5x10" 5x10" 10x10"    Wall flex stretch 5x10"        5x10" 5x10"   Wand flexion AAROM      10x  10x     Wall slides      10x 10x 10x 5x    Wall circles      10x 10 ea 10 ea 5 ea    Wand abd AAROM      10x  10x     shld abd dumbbell 10x 1#        10x 1#    shld flex dumbbell 10x 1#       10x 1# 10x 1#    Doorway ER 5x10"                                      Cross body adduction       3x15"  3x15" 3x15"   Ther Activity                                       Gait Training                                       Modalities             Cold pack

## 2022-09-17 ENCOUNTER — APPOINTMENT (OUTPATIENT)
Dept: RADIOLOGY | Facility: HOSPITAL | Age: 72
End: 2022-09-17
Payer: COMMERCIAL

## 2022-09-17 ENCOUNTER — HOSPITAL ENCOUNTER (EMERGENCY)
Facility: HOSPITAL | Age: 72
Discharge: HOME | End: 2022-09-17
Attending: EMERGENCY MEDICINE
Payer: COMMERCIAL

## 2022-09-17 VITALS
BODY MASS INDEX: 20.77 KG/M2 | HEIGHT: 61 IN | WEIGHT: 110 LBS | OXYGEN SATURATION: 98 % | TEMPERATURE: 98.7 F | RESPIRATION RATE: 18 BRPM | HEART RATE: 70 BPM | DIASTOLIC BLOOD PRESSURE: 74 MMHG | SYSTOLIC BLOOD PRESSURE: 174 MMHG

## 2022-09-17 DIAGNOSIS — R07.89 NON-CARDIAC CHEST PAIN: Primary | ICD-10-CM

## 2022-09-17 LAB
2HR DELTA HS TROPONIN: 1 NG/L
ALBUMIN SERPL BCP-MCNC: 3.8 G/DL (ref 3.5–5)
ALP SERPL-CCNC: 89 U/L (ref 46–116)
ALT SERPL W P-5'-P-CCNC: 27 U/L (ref 12–78)
ANION GAP SERPL CALCULATED.3IONS-SCNC: 7 MMOL/L (ref 4–13)
AST SERPL W P-5'-P-CCNC: 19 U/L (ref 5–45)
ATRIAL RATE: 65 BPM
ATRIAL RATE: 71 BPM
BASOPHILS # BLD AUTO: 0.06 THOUSANDS/ΜL (ref 0–0.1)
BASOPHILS NFR BLD AUTO: 1 % (ref 0–1)
BILIRUB SERPL-MCNC: 0.3 MG/DL (ref 0.2–1)
BUN SERPL-MCNC: 18 MG/DL (ref 5–25)
CALCIUM SERPL-MCNC: 9.3 MG/DL (ref 8.3–10.1)
CARDIAC TROPONIN I PNL SERPL HS: 3 NG/L
CARDIAC TROPONIN I PNL SERPL HS: 4 NG/L
CHLORIDE SERPL-SCNC: 98 MMOL/L (ref 96–108)
CO2 SERPL-SCNC: 28 MMOL/L (ref 21–32)
CREAT SERPL-MCNC: 0.61 MG/DL (ref 0.6–1.3)
EOSINOPHIL # BLD AUTO: 0.08 THOUSAND/ΜL (ref 0–0.61)
EOSINOPHIL NFR BLD AUTO: 1 % (ref 0–6)
ERYTHROCYTE [DISTWIDTH] IN BLOOD BY AUTOMATED COUNT: 12.3 % (ref 11.6–15.1)
GFR SERPL CREATININE-BSD FRML MDRD: 89 ML/MIN/1.73SQ M
GLUCOSE SERPL-MCNC: 95 MG/DL (ref 65–140)
HCT VFR BLD AUTO: 38.9 % (ref 34.8–46.1)
HGB BLD-MCNC: 13.4 G/DL (ref 11.5–15.4)
IMM GRANULOCYTES # BLD AUTO: 0.03 THOUSAND/UL (ref 0–0.2)
IMM GRANULOCYTES NFR BLD AUTO: 0 % (ref 0–2)
LYMPHOCYTES # BLD AUTO: 2 THOUSANDS/ΜL (ref 0.6–4.47)
LYMPHOCYTES NFR BLD AUTO: 28 % (ref 14–44)
MCH RBC QN AUTO: 30.8 PG (ref 26.8–34.3)
MCHC RBC AUTO-ENTMCNC: 34.4 G/DL (ref 31.4–37.4)
MCV RBC AUTO: 89 FL (ref 82–98)
MONOCYTES # BLD AUTO: 0.81 THOUSAND/ΜL (ref 0.17–1.22)
MONOCYTES NFR BLD AUTO: 11 % (ref 4–12)
NEUTROPHILS # BLD AUTO: 4.25 THOUSANDS/ΜL (ref 1.85–7.62)
NEUTS SEG NFR BLD AUTO: 59 % (ref 43–75)
NRBC BLD AUTO-RTO: 0 /100 WBCS
P AXIS: 68 DEGREES
P AXIS: 71 DEGREES
PLATELET # BLD AUTO: 198 THOUSANDS/UL (ref 149–390)
PMV BLD AUTO: 9 FL (ref 8.9–12.7)
POTASSIUM SERPL-SCNC: 4.1 MMOL/L (ref 3.5–5.3)
PR INTERVAL: 164 MS
PR INTERVAL: 166 MS
PROT SERPL-MCNC: 6.7 G/DL (ref 6.4–8.4)
QRS AXIS: 90 DEGREES
QRS AXIS: 92 DEGREES
QRSD INTERVAL: 92 MS
QRSD INTERVAL: 92 MS
QT INTERVAL: 390 MS
QT INTERVAL: 402 MS
QTC INTERVAL: 418 MS
QTC INTERVAL: 423 MS
RBC # BLD AUTO: 4.35 MILLION/UL (ref 3.81–5.12)
SODIUM SERPL-SCNC: 133 MMOL/L (ref 135–147)
T WAVE AXIS: 62 DEGREES
T WAVE AXIS: 68 DEGREES
VENTRICULAR RATE: 65 BPM
VENTRICULAR RATE: 71 BPM
WBC # BLD AUTO: 7.23 THOUSAND/UL (ref 4.31–10.16)

## 2022-09-17 PROCEDURE — 99285 EMERGENCY DEPT VISIT HI MDM: CPT | Performed by: EMERGENCY MEDICINE

## 2022-09-17 PROCEDURE — 93010 ELECTROCARDIOGRAM REPORT: CPT | Performed by: INTERNAL MEDICINE

## 2022-09-17 PROCEDURE — 84484 ASSAY OF TROPONIN QUANT: CPT | Performed by: EMERGENCY MEDICINE

## 2022-09-17 PROCEDURE — 93005 ELECTROCARDIOGRAM TRACING: CPT

## 2022-09-17 PROCEDURE — 85025 COMPLETE CBC W/AUTO DIFF WBC: CPT | Performed by: EMERGENCY MEDICINE

## 2022-09-17 PROCEDURE — 99285 EMERGENCY DEPT VISIT HI MDM: CPT

## 2022-09-17 PROCEDURE — 80053 COMPREHEN METABOLIC PANEL: CPT | Performed by: EMERGENCY MEDICINE

## 2022-09-17 PROCEDURE — 71046 X-RAY EXAM CHEST 2 VIEWS: CPT

## 2022-09-17 PROCEDURE — 36415 COLL VENOUS BLD VENIPUNCTURE: CPT | Performed by: EMERGENCY MEDICINE

## 2022-09-17 NOTE — ED PROVIDER NOTES
History  Chief Complaint   Patient presents with    Chest Pain     Pt was sitting at home doing paperwork at 1440, sharp pain in center of chest going up to neck, last 10 min     Patient with past medical history hiatal hernia, GERD, asthma, hyper lipid, presents with concern for sharp sudden sternal chest pain while watching TV around 2:00 p m  today  Patient states she has been having some upper abdominal pain that a strain for the last few weeks  She has been seeing physical therapy  This was different as this radiated up the sternal area  It lasted about 10 minutes and resolved on its own  It did not radiate to the back  She did not get sweaty or trouble breathing  She had no associated nausea vomiting or belching  Prior to Admission Medications   Prescriptions Last Dose Informant Patient Reported? Taking? Acetaminophen Extra Strength 500 MG tablet  Self Yes No   Alum Hydroxide-Mag Carbonate (GAVISCON EXTRA RELIEF FORMULA PO)  Self Yes No   Breo Ellipta 200-25 MCG/INH inhaler  Self No No   Sig: TAKE 1 PUFF BY MOUTH EVERY DAY   Cholecalciferol 50 MCG (2000 UT) CAPS  Self Yes No   Sig: Take 1 tablet by mouth daily   Influenza Virus Vacc Split PF 0 5 ML YURIDIA  Self Yes No   Sig: inject 0 5 milliliter intramuscularly   Multiple Vitamins-Minerals (PRESERVISION AREDS 2 PO)  Self Yes No   Sig: Take by mouth   Peak Flow Meter NANDA  Self Yes No   Si Act by Does not apply route 2 (two) times a day   albuterol (2 5 mg/3 mL) 0 083 % nebulizer solution  Self No No   Sig: Take 1 vial (2 5 mg total) by nebulization every 6 (six) hours as needed for wheezing or shortness of breath   albuterol (PROVENTIL HFA,VENTOLIN HFA) 90 mcg/act inhaler  Self No No   Sig: Inhale 2 puffs every 4 (four) hours as needed for shortness of breath   budesonide (Pulmicort) 0 5 mg/2 mL nebulizer solution  Self No No   Sig: Take 2 mL (0 5 mg total) by nebulization 2 (two) times a day Rinse mouth after use     conjugated estrogens (PREMARIN) vaginal cream  Self Yes No   Sig: Insert 1 g into the vagina once a week   esomeprazole (NexIUM) 20 mg capsule  Self Yes No   Sig: Take 20 mg by mouth daily at bedtime   famotidine (PEPCID) 10 mg tablet  Self Yes No   Sig: Take 10 mg by mouth daily   fluticasone (VERAMYST) 27 5 MCG/SPRAY nasal spray  Self No No   Si spray into each nostril daily   guaiFENesin (MUCINEX) 600 mg 12 hr tablet  Self Yes No   Sig: Take 600 mg by mouth every 12 (twelve) hours   Patient not taking: Reported on 2022   levothyroxine 50 mcg tablet  Self Yes No   Sig: Take 50 mcg by mouth daily in the early morning   metroNIDAZOLE (METROGEL) 1 % gel  Self Yes No   Sig: Apply 1 application topically daily   polyethylene glycol-propylene glycol (SYSTANE) 0 4-0 3 %  Self Yes No   Sig: Apply 1 drop to eye Three times a day   predniSONE 20 mg tablet  Self No No   Sig: Take 2 tablets (40 mg total) by mouth daily   simvastatin (ZOCOR) 10 mg tablet  Self Yes No   Sig: Take 10 mg by mouth   sucralfate (CARAFATE) 1 g tablet  Self Yes No   Sig: Take 1 g by mouth in the morning and 1 g in the evening  Facility-Administered Medications: None       Past Medical History:   Diagnosis Date    Asthma     Cataract     Disease of thyroid gland     GERD (gastroesophageal reflux disease)     Hyperlipidemia        Past Surgical History:   Procedure Laterality Date    COLONOSCOPY      EYE SURGERY Bilateral     preventative glaucoma surgery    THYROID SURGERY         Family History   Problem Relation Age of Onset    Hypertension Father     Cancer Maternal Aunt         lip cancer     I have reviewed and agree with the history as documented      E-Cigarette/Vaping    E-Cigarette Use Never User      E-Cigarette/Vaping Substances    Nicotine No     THC No     CBD No     Flavoring No     Other No     Unknown No      Social History     Tobacco Use    Smoking status: Never Smoker    Smokeless tobacco: Never Used   Vaping Use    Vaping Use: Never used   Substance Use Topics    Alcohol use: Not Currently    Drug use: Never       Review of Systems   Constitutional: Negative for chills and fever  HENT: Negative for rhinorrhea and sore throat  Respiratory: Negative for shortness of breath  Cardiovascular: Positive for chest pain  Gastrointestinal: Negative for constipation, diarrhea, nausea and vomiting  Genitourinary: Negative for dysuria and frequency  Skin: Negative for rash  All other systems reviewed and are negative  Physical Exam  Physical Exam  Vitals and nursing note reviewed  Constitutional:       Appearance: She is well-developed  HENT:      Head: Normocephalic and atraumatic  Right Ear: External ear normal       Left Ear: External ear normal       Nose: Nose normal    Eyes:      Conjunctiva/sclera: Conjunctivae normal       Pupils: Pupils are equal, round, and reactive to light  Cardiovascular:      Rate and Rhythm: Normal rate and regular rhythm  Heart sounds: Normal heart sounds  Pulmonary:      Effort: Pulmonary effort is normal  No respiratory distress  Breath sounds: Normal breath sounds  No wheezing  Chest:      Chest wall: No tenderness  Abdominal:      General: Bowel sounds are normal  There is no distension or abdominal bruit  Palpations: Abdomen is soft  There is no pulsatile mass  Tenderness: There is no abdominal tenderness  Musculoskeletal:         General: No deformity  Normal range of motion  Cervical back: Normal range of motion and neck supple  No spinous process tenderness  Skin:     General: Skin is warm and dry  Findings: No rash  Neurological:      General: No focal deficit present  Mental Status: She is alert  GCS: GCS eye subscore is 4  GCS verbal subscore is 5  GCS motor subscore is 6  Sensory: No sensory deficit  Psychiatric:         Mood and Affect: Mood is anxious           Vital Signs  ED Triage Vitals [09/17/22 1555]   Temperature Pulse Respirations Blood Pressure SpO2   98 7 °F (37 1 °C) 70 18 (!) 197/76 98 %      Temp Source Heart Rate Source Patient Position - Orthostatic VS BP Location FiO2 (%)   Temporal Monitor Sitting Right arm --      Pain Score       No Pain           Vitals:    09/17/22 1555 09/17/22 1757   BP: (!) 197/76 (!) 174/74   Pulse: 70    Patient Position - Orthostatic VS: Sitting          Visual Acuity      ED Medications  Medications - No data to display    Diagnostic Studies  Results Reviewed     Procedure Component Value Units Date/Time    HS Troponin I 2hr [[id number]]  (Normal) Collected: 09/17/22 1830    Lab Status: Final result Specimen: Blood from Arm, Right Updated: 09/17/22 1858     hs TnI 2hr 4 ng/L      Delta 2hr hsTnI 1 ng/L     HS Troponin 0hr (reflex protocol) [[id number]]  (Normal) Collected: 09/17/22 1605    Lab Status: Final result Specimen: Blood from Arm, Left Updated: 09/17/22 1644     hs TnI 0hr 3 ng/L     Comprehensive metabolic panel [[id number]]  (Abnormal) Collected: 09/17/22 1605    Lab Status: Final result Specimen: Blood from Arm, Left Updated: 09/17/22 1635     Sodium 133 mmol/L      Potassium 4 1 mmol/L      Chloride 98 mmol/L      CO2 28 mmol/L      ANION GAP 7 mmol/L      BUN 18 mg/dL      Creatinine 0 61 mg/dL      Glucose 95 mg/dL      Calcium 9 3 mg/dL      AST 19 U/L      ALT 27 U/L      Alkaline Phosphatase 89 U/L      Total Protein 6 7 g/dL      Albumin 3 8 g/dL      Total Bilirubin 0 30 mg/dL      eGFR 89 ml/min/1 73sq m     Narrative:      [place] guidelines for Chronic Kidney Disease (CKD):     Stage 1 with normal or high GFR (GFR > 90 mL/min/1 73 square meters)    Stage 2 Mild CKD (GFR = 60-89 mL/min/1 73 square meters)    Stage 3A Moderate CKD (GFR = 45-59 mL/min/1 73 square meters)    Stage 3B Moderate CKD (GFR = 30-44 mL/min/1 73 square meters)    Stage 4 Severe CKD (GFR = 15-29 mL/min/1 73 square meters)    Stage 5 End Stage CKD (GFR <15 mL/min/1 73 square meters)  Note: GFR calculation is accurate only with a steady state creatinine    CBC and differential [[id number]] Collected: 09/17/22 1605    Lab Status: Final result Specimen: Blood from Arm, Left Updated: 09/17/22 1621     WBC 7 23 Thousand/uL      RBC 4 35 Million/uL      Hemoglobin 13 4 g/dL      Hematocrit 38 9 %      MCV 89 fL      MCH 30 8 pg      MCHC 34 4 g/dL      RDW 12 3 %      MPV 9 0 fL      Platelets 559 Thousands/uL      nRBC 0 /100 WBCs      Neutrophils Relative 59 %      Immat GRANS % 0 %      Lymphocytes Relative 28 %      Monocytes Relative 11 %      Eosinophils Relative 1 %      Basophils Relative 1 %      Neutrophils Absolute 4 25 Thousands/µL      Immature Grans Absolute 0 03 Thousand/uL      Lymphocytes Absolute 2 00 Thousands/µL      Monocytes Absolute 0 81 Thousand/µL      Eosinophils Absolute 0 08 Thousand/µL      Basophils Absolute 0 06 Thousands/µL                  XR chest pa & lateral   ED Interpretation by Hannah Lancaster DO (09/17 1740)   No acute abnl                 Procedures  ECG 12 Lead Documentation Only    Date/Time: 9/17/2022 6:00 PM  Performed by: Hannah Lancaster DO  Authorized by: Hannah Lancaster DO     Indications / Diagnosis:  Chest pain  ECG reviewed by me, the ED Provider: yes    Patient location:  ED  Previous ECG:     Previous ECG:  Unavailable  Interpretation:     Interpretation: normal    Rate:     ECG rate:  71    ECG rate assessment: normal    Rhythm:     Rhythm: sinus rhythm    Ectopy:     Ectopy: none    QRS:     QRS axis:  Right    QRS intervals:  Normal  Conduction:     Conduction: normal    ST segments:     ST segments:  Normal  T waves:     T waves: normal               ED Course             HEART Risk Score    Flowsheet Row Most Recent Value   Heart Score Risk Calculator    History 0 Filed at: 09/17/2022 1759   ECG 0 Filed at: 09/17/2022 1759   Age 2 Filed at: 09/17/2022 1759   Risk Factors 1 Filed at: 09/17/2022 1759   Troponin 0 Filed at: 09/17/2022 1759   HEART Score 3 Filed at: 09/17/2022 1759                                      [facility]  Number of Diagnoses or Management Options  Non-cardiac chest pain: new and requires workup     Amount and/or Complexity of Data Reviewed  Clinical lab tests: ordered and reviewed  Tests in the radiology section of CPT®: ordered and reviewed    Patient Progress  Patient progress: improved      Disposition  Final diagnoses:   Non-cardiac chest pain     Time reflects when diagnosis was documented in both MDM as applicable and the Disposition within this note     Time User Action Codes Description Comment    9/17/2022  7:21 PM Haresh Media Add [R07 89] Non-cardiac chest pain       ED Disposition     ED Disposition   Discharge    Condition   Stable    Date/Time   Sat Sep 17, 2022  7:20 PM    Comment   Lupe Azul discharge to home/self care                 Follow-up Information     Follow up With Specialties Details Why Contact Info    Jordan Keyes DO Family Medicine Schedule an appointment as soon as possible for a visit   [address] [place] [id number]  [phone]            Discharge Medication List as of 9/17/2022  7:21 PM      CONTINUE these medications which have NOT CHANGED    Details   Acetaminophen Extra Strength 500 MG tablet Starting Thu 6/9/2022, Historical Med      albuterol (2 5 mg/3 mL) 0 083 % nebulizer solution Take 1 vial (2 5 mg total) by nebulization every 6 (six) hours as needed for wheezing or shortness of breath, Starting Sat 4/3/2021, Normal      albuterol (PROVENTIL HFA,VENTOLIN HFA) 90 mcg/act inhaler Inhale 2 puffs every 4 (four) hours as needed for shortness of breath, Starting Tue 7/5/2022, Normal      Alum Hydroxide-Mag Carbonate (GAVISCON EXTRA RELIEF FORMULA PO) Starting Fri 9/10/2010, Historical Med      Breo Ellipta 200-25 MCG/INH inhaler TAKE 1 PUFF BY MOUTH EVERY DAY, Normal      budesonide (Pulmicort) 0 5 mg/2 mL nebulizer solution Take 2 mL (0 5 mg total) by nebulization 2 (two) times a day Rinse mouth after use , Starting Fri 11/19/2021, Normal      Cholecalciferol 50 MCG (2000 UT) CAPS Take 1 tablet by mouth daily, Historical Med      conjugated estrogens (PREMARIN) vaginal cream Insert 1 g into the vagina once a week, Starting Mon 11/27/2017, Until Tue 8/24/2021, Historical Med      esomeprazole (NexIUM) 20 mg capsule Take 20 mg by mouth daily at bedtime, Starting Thu 7/19/2018, Historical Med      famotidine (PEPCID) 10 mg tablet Take 10 mg by mouth daily, Historical Med      fluticasone (VERAMYST) 27 5 MCG/SPRAY nasal spray 1 spray into each nostril daily, Starting Mon 10/19/2020, No Print      guaiFENesin (MUCINEX) 600 mg 12 hr tablet Take 600 mg by mouth every 12 (twelve) hours, Historical Med      Influenza Virus Vacc Split PF 0 5 ML YURIDIA inject 0 5 milliliter intramuscularly, Historical Med      levothyroxine 50 mcg tablet Take 50 mcg by mouth daily in the early morning, Starting Tue 7/31/2018, Historical Med      metroNIDAZOLE (METROGEL) 1 % gel Apply 1 application topically daily, Starting Thu 11/6/2008, Historical Med      Multiple Vitamins-Minerals (PRESERVISION AREDS 2 PO) Take by mouth, Historical Med      Peak Flow Meter NANDA 1 Act by Does not apply route 2 (two) times a day, Starting Tue 2/20/2018, Historical Med      polyethylene glycol-propylene glycol (SYSTANE) 0 4-0 3 % Apply 1 drop to eye Three times a day, Historical Med      predniSONE 20 mg tablet Take 2 tablets (40 mg total) by mouth daily, Starting Tue 2/8/2022, Normal      simvastatin (ZOCOR) 10 mg tablet Take 10 mg by mouth, Starting Fri 3/2/2018, Historical Med      sucralfate (CARAFATE) 1 g tablet Take 1 g by mouth in the morning and 1 g in the evening , Starting Tue 4/26/2022, Historical Med             No discharge procedures on file      PDMP Review     None          ED Provider  Electronically Signed by           Karly Thornton DO  09/17/22 2499

## 2022-09-19 ENCOUNTER — OFFICE VISIT (OUTPATIENT)
Dept: PHYSICAL THERAPY | Facility: CLINIC | Age: 72
End: 2022-09-19
Payer: COMMERCIAL

## 2022-09-19 DIAGNOSIS — Z98.890 S/P RIGHT ROTATOR CUFF REPAIR: ICD-10-CM

## 2022-09-19 DIAGNOSIS — G89.29 CHRONIC RIGHT SHOULDER PAIN: ICD-10-CM

## 2022-09-19 DIAGNOSIS — M95.8: Primary | ICD-10-CM

## 2022-09-19 DIAGNOSIS — M25.511 CHRONIC RIGHT SHOULDER PAIN: ICD-10-CM

## 2022-09-19 PROCEDURE — 97140 MANUAL THERAPY 1/> REGIONS: CPT | Performed by: PHYSICAL THERAPIST

## 2022-09-19 PROCEDURE — 97110 THERAPEUTIC EXERCISES: CPT | Performed by: PHYSICAL THERAPIST

## 2022-09-19 NOTE — PROGRESS NOTES
Daily Note    Today's date: 2022  Patient name: Hina Barnett  : 1950  MRN: [id number]  Referring provider: Shannon Millan MD  Dx:   Encounter Diagnosis     ICD-10-CM    1  Acquired deformity of the abdomen  M95 8    2  S/P right rotator cuff repair  Z98 890    3  Chronic right shoulder pain  M25 511     G89 29                   Subjective: Patient reports that she had some increase in soreness in abdominal area following last session but felt fine during session  On Saturday, she noted pain radiating up chest in area of sternum which was new pain noticed by patient, lasting for 10 minutes  She was nervous about this pain since it was new, so [facility] to [facility] but [facility]  She ended going to ER  She had elevated BP while there, but had extensive testing done and was discharged with instructions to follow up with PCP  Her final diagnosis on ER note said non cardiac chest pain  Patient is going to follow up with PCP today for appointment  She is unsure what exactly caused chest pain as she denies GI symptoms or stress/anxiety at time of pain  Objective: See treatment diary below  Vitals: /78  HR 73  02% 97    Assessment: Held core stability exercises today due to pain over the weekend  Vitals were taken today and were within normal limits  Today's session was mostly focused on manual therapy to R shoulder, but also patient education on effect of stress/anxiety on pain  Patient was also educated on amplified pain and how pain can radiate to proximal areas if nervous system is hypersensitized  Recommended patient follow up with PCP as per ED recommendation  She verbalized understanding  Plan: Continue per plan of care, follow protocol  Precautions: DOS 22  Follow protocol  EPOC 22         Manuals 9/15 9/19    8/30 9/1 9/6 9/8 9/13   Shoulder PROM flexion and ER EVELIO EVELIO    EVELIO EVELIO EVELIO EVELIO    STM UT and posterior shld EVELIO EVELIO       EVELIO and TPR                 Neuro Re-Ed             Rows with retraction      2x10 gtb       Prone UL I         10x5"    shld ext w/ retraction and core brace 2x10 rtb     2x10 gtb 3x10 gtb 3x10 palms fwd rtb 3x10 palms fwd rtb    ER with retraction 3x10 rtb     2x10 rtb 3x10 rtb 3x10 rtb 3x10 rtb    IR with retraction 3x10 rtb     2x10 rtb 2x10 gtb 2x10 gtb 3x10 gtb    Transverse ab bracing 10x            Brace with exhale 10x            Diaphragmatic breathing 10x            Ther Ex             Supine flex AAROM             Chemo standing             Pt edu on plan of care, pathology, home program  10' edu and vital assessment        5'   IR PROM with strap 5x10"      5x10" 5x10" 10x10"    Wall flex stretch 5x10"        5x10" 5x10"   Wand flexion AAROM      10x  10x     Wall slides      10x 10x 10x 5x    Wall circles      10x 10 ea 10 ea 5 ea    Wand abd AAROM      10x  10x     shld abd dumbbell 10x 1#        10x 1#    shld flex dumbbell 10x 1#       10x 1# 10x 1#    Doorway ER 5x10"                                      Cross body adduction       3x15"  3x15" 3x15"   Ther Activity                                       Gait Training                                       Modalities             Cold pack

## 2022-09-22 ENCOUNTER — OFFICE VISIT (OUTPATIENT)
Dept: PHYSICAL THERAPY | Facility: CLINIC | Age: 72
End: 2022-09-22
Payer: COMMERCIAL

## 2022-09-22 DIAGNOSIS — M25.511 CHRONIC RIGHT SHOULDER PAIN: ICD-10-CM

## 2022-09-22 DIAGNOSIS — Z98.890 S/P RIGHT ROTATOR CUFF REPAIR: Primary | ICD-10-CM

## 2022-09-22 DIAGNOSIS — G89.29 CHRONIC RIGHT SHOULDER PAIN: ICD-10-CM

## 2022-09-22 PROCEDURE — 97140 MANUAL THERAPY 1/> REGIONS: CPT | Performed by: PHYSICAL THERAPIST

## 2022-09-22 PROCEDURE — 97110 THERAPEUTIC EXERCISES: CPT | Performed by: PHYSICAL THERAPIST

## 2022-09-22 NOTE — PROGRESS NOTES
Daily Note    Today's date: 2022  Patient name: He Hernandez  : 1950  MRN: [id number]  Referring provider: Loco Sanchez MD  Dx:   Encounter Diagnosis     ICD-10-CM    1  S/P right rotator cuff repair  Z98 890    2  Chronic right shoulder pain  M25 511     G89 29                   Subjective: Patient reports that she has follow up scheduled with PCP next week  She reports improvement in low back and abdominal pain, but not completely resolved  She also notes increased R shoulder discomfort and soreness following changing light bulb yesterday  She also notes some lateral neck/upper trap soreness following changing light bulb  Objective: See treatment diary below      Assessment: Held core stability exercises today again due to resolving pain from flare up over last weekend  She presented with increased stiffness and shoulder soreness compared to previous session, likely due to working overhead yesterday in home  Held strengthening exercises today and emphasis was placed on stretching and ROM exercises due to stiffness  Instructed patient on icing when going home, and engaging in active rest for shoulder and abdominal areas  Plan: Continue per plan of care, follow protocol  Precautions: DOS 22  Follow protocol  EPOC 22         Manuals 9/15 9/19 9/22   8/30 9/1 9/6 9/8 9/13   Shoulder PROM flexion and ER EVELIO EVELIO EVELIO   EVELIO EVELIO EVELIO EVELIO    STM UT and posterior shld EVELIO EVELIO EVELIO      EVELIO and TPR                 Neuro Re-Ed             Rows with retraction      2x10 gtb       Prone UL I         10x5"    shld ext w/ retraction and core brace 2x10 rtb     2x10 gtb 3x10 gtb 3x10 palms fwd rtb 3x10 palms fwd rtb    ER with retraction 3x10 rtb     2x10 rtb 3x10 rtb 3x10 rtb 3x10 rtb    IR with retraction 3x10 rtb     2x10 rtb 2x10 gtb 2x10 gtb 3x10 gtb    Transverse ab bracing 10x            Brace with exhale 10x            Diaphragmatic breathing 10x            Ther Ex             Supine flex NAVNEET Daughertyey standing   10x5"          Pt edu on plan of care, pathology, home program  10' edu and vital assessment 5'       5'   IR PROM with strap 5x10"  5x10"    5x10" 5x10" 10x10"    Wall flex stretch 5x10"  5x10"      5x10" 5x10"   Wand flexion AAROM      10x  10x     Wall slides      10x 10x 10x 5x    Wall circles      10x 10 ea 10 ea 5 ea    Wand abd AAROM      10x  10x     shld abd dumbbell 10x 1#        10x 1#    shld flex dumbbell 10x 1#       10x 1# 10x 1#    Doorway ER 5x10"  10x5"                                    Cross body adduction   5x10"    3x15"  3x15" 3x15"   Ther Activity                                       Gait Training                                       Modalities             Cold pack

## 2022-09-23 LAB
ATRIAL RATE: 65 BPM
P AXIS: 68 DEGREES
PR INTERVAL: 166 MS
QRS AXIS: 90 DEGREES
QRSD INTERVAL: 92 MS
QT INTERVAL: 402 MS
QTC INTERVAL: 418 MS
T WAVE AXIS: 62 DEGREES
VENTRICULAR RATE: 65 BPM

## 2022-09-23 PROCEDURE — 93010 ELECTROCARDIOGRAM REPORT: CPT | Performed by: INTERNAL MEDICINE

## 2022-09-26 ENCOUNTER — OFFICE VISIT (OUTPATIENT)
Dept: PHYSICAL THERAPY | Facility: CLINIC | Age: 72
End: 2022-09-26
Payer: COMMERCIAL

## 2022-09-26 DIAGNOSIS — M25.511 CHRONIC RIGHT SHOULDER PAIN: ICD-10-CM

## 2022-09-26 DIAGNOSIS — G89.29 CHRONIC RIGHT SHOULDER PAIN: ICD-10-CM

## 2022-09-26 DIAGNOSIS — Z98.890 S/P RIGHT ROTATOR CUFF REPAIR: Primary | ICD-10-CM

## 2022-09-26 DIAGNOSIS — M95.8: ICD-10-CM

## 2022-09-26 PROCEDURE — 97112 NEUROMUSCULAR REEDUCATION: CPT | Performed by: PHYSICAL THERAPIST

## 2022-09-26 PROCEDURE — 97110 THERAPEUTIC EXERCISES: CPT | Performed by: PHYSICAL THERAPIST

## 2022-09-26 PROCEDURE — 97140 MANUAL THERAPY 1/> REGIONS: CPT | Performed by: PHYSICAL THERAPIST

## 2022-09-26 NOTE — PROGRESS NOTES
Daily Note    Today's date: 2022  Patient name: Sidney Wright  : 1950  MRN: [id number]  Referring provider: Edward Boone MD  Dx:   Encounter Diagnosis     ICD-10-CM    1  S/P right rotator cuff repair  Z98 890    2  Chronic right shoulder pain  M25 511     G89 29    3  Acquired deformity of the abdomen  M95 8                   Subjective: Patient reports improvement in abdominal pain over the past few days, but flare up today when helping to lift heavy window  She reports mild shoulder pain today but still increases with doing small movements like brushing teeth  Objective: See treatment diary below      Assessment: Re-introduced core stability exercises today; patient required cuing for correct technique and performed without pain  Progressed flexibility exercises with addition of wall and pulley abduction stretches  Patient would benefit from continued skilled PT per plan of care  Plan: Continue per plan of care, follow protocol  Precautions: DOS 22  Follow protocol  EPOC 22         Manuals 9/15 9/19 9/22 9/26         Shoulder PROM flexion and ER EVELIO EVELIO EVELIO EVELIO         STM UT and posterior shld EVELIO EVELIO EVELIO                       Neuro Re-Ed             Rows with retraction             Prone UL I             shld ext w/ retraction and core brace 2x10 rtb   3x10 rtb         ER with retraction 3x10 rtb   3x10 rtb         IR with retraction 3x10 rtb            Transverse ab bracing 10x            Bent knee fall out    5 ea         Brace with exhale 10x   10x         Diaphragmatic breathing 10x   10x         Ther Ex             Supine flex AAROM             Pulley flex   10x5" 10x10"         Pulley abd    10x10"         Pt edu on plan of care, pathology, home program  10' edu and vital assessment 5'          IR PROM with strap 5x10"  5x10"          Wall flex stretch 5x10"  5x10" 5x10"         Wall abd stretch    5x10"         Wall circles             Wand abd AAROM             shld abd dumbbell 10x 1#            shld flex dumbbell 10x 1#            Doorway ER 5x10"  10x5"                                    Cross body adduction   5x10"          Ther Activity                                       Gait Training                                       Modalities             Cold pack

## 2022-09-29 ENCOUNTER — OFFICE VISIT (OUTPATIENT)
Dept: PHYSICAL THERAPY | Facility: CLINIC | Age: 72
End: 2022-09-29
Payer: COMMERCIAL

## 2022-09-29 DIAGNOSIS — M25.511 CHRONIC RIGHT SHOULDER PAIN: ICD-10-CM

## 2022-09-29 DIAGNOSIS — Z98.890 S/P RIGHT ROTATOR CUFF REPAIR: Primary | ICD-10-CM

## 2022-09-29 DIAGNOSIS — G89.29 CHRONIC RIGHT SHOULDER PAIN: ICD-10-CM

## 2022-09-29 DIAGNOSIS — M95.8: ICD-10-CM

## 2022-09-29 PROCEDURE — 97112 NEUROMUSCULAR REEDUCATION: CPT | Performed by: PHYSICAL THERAPIST

## 2022-09-29 PROCEDURE — 97140 MANUAL THERAPY 1/> REGIONS: CPT | Performed by: PHYSICAL THERAPIST

## 2022-09-29 PROCEDURE — 97110 THERAPEUTIC EXERCISES: CPT | Performed by: PHYSICAL THERAPIST

## 2022-09-29 NOTE — PROGRESS NOTES
Daily Note    Today's date: 2022  Patient name: Julissa Bennett  : 1950  MRN: [id number]  Referring provider: Carlos Mabry MD  Dx:   Encounter Diagnosis     ICD-10-CM    1  S/P right rotator cuff repair  Z98 890    2  Chronic right shoulder pain  M25 511     G89 29    3  Acquired deformity of the abdomen  M95 8                   Subjective: Patient reports no aggravation of abdominal symptoms or low back pain following last session  She also spoke to physician about history of hiatal hernia, and she did not believe this was contributing to abdominal pain  Patient notes getting flu shot today in R shoulder  She notes some shoulder pain when holding gas pump nozzle in yesterday when fueling her car  Objective: See treatment diary below      Assessment: Shoulder strengthening exercises were held today due to getting flu shot and to avoid aggravation of deltoid muscle  Patient was able to tolerate progression of core stability exercises well without pain, but requiring mild cuing for correct technique and muscle recruitment  Shoulder stretches were tolerated well  Initiated functional/task oriented strengthening with placing cones on high shelf and isometric  holds with proper shoulder posture  Patient requires continued skilled PT to address all impairments and meet long term goals  Plan: Continue per plan of care, follow protocol  Precautions: DOS 22  Follow protocol  EPOC 22         Manuals 9/15 9/19 9/22 9/26 9/29        Shoulder PROM flexion and ER EVELIO EVELIO EVELIO EVELIO EVELIO        STM UT and posterior shld EVELIO EVELIO EVELIO                       Neuro Re-Ed             Rows with retraction             Prone UL I             shld ext w/ retraction and core brace 2x10 rtb   3x10 rtb         ER with retraction 3x10 rtb   3x10 rtb         IR with retraction 3x10 rtb            Transverse ab bracing 10x            Bent knee fall out    5 ea 10 ea        Brace with bridge     10x        Brace with exhale 10x   10x         Diaphragmatic breathing 10x   10x 10x        Ther Ex             Supine flex AAROM             Pulley flex   10x5" 10x10" 10x10"        Pulley abd    10x10" 10x10"        Pt edu on plan of care, pathology, home program  10' edu and vital assessment 5'  2'        IR PROM with strap 5x10"  5x10"  5x10"        Wall flex stretch 5x10"  5x10" 5x10" 5x10"        Wall abd stretch    5x10" 5x10"        Wand abd AAROM             shld abd dumbbell 10x 1#            shld flex dumbbell 10x 1#            Doorway ER 5x10"  10x5"  5x10"                                  Cross body adduction   5x10"  5x10"        Ther Activity             Cones into high shelf     12x        Gripper with proper shoulder posture     2x30" green        Gait Training                                       Modalities             Cold pack

## 2022-10-03 ENCOUNTER — OFFICE VISIT (OUTPATIENT)
Dept: PHYSICAL THERAPY | Facility: CLINIC | Age: 72
End: 2022-10-03
Payer: COMMERCIAL

## 2022-10-03 DIAGNOSIS — Z98.890 S/P RIGHT ROTATOR CUFF REPAIR: Primary | ICD-10-CM

## 2022-10-03 DIAGNOSIS — G89.29 CHRONIC RIGHT SHOULDER PAIN: ICD-10-CM

## 2022-10-03 DIAGNOSIS — M25.511 CHRONIC RIGHT SHOULDER PAIN: ICD-10-CM

## 2022-10-03 DIAGNOSIS — M95.8: ICD-10-CM

## 2022-10-03 PROCEDURE — 97112 NEUROMUSCULAR REEDUCATION: CPT | Performed by: PHYSICAL THERAPIST

## 2022-10-03 PROCEDURE — 97140 MANUAL THERAPY 1/> REGIONS: CPT | Performed by: PHYSICAL THERAPIST

## 2022-10-03 PROCEDURE — 97110 THERAPEUTIC EXERCISES: CPT | Performed by: PHYSICAL THERAPIST

## 2022-10-03 NOTE — PROGRESS NOTES
Daily Note    Today's date: 10/3/2022  Patient name: Sangeetha Salomon  : 1950  MRN: [id number]  Referring provider: Elizabeth Tate MD  Dx:   Encounter Diagnosis     ICD-10-CM    1  S/P right rotator cuff repair  Z98 890    2  Chronic right shoulder pain  M25 511     G89 29    3  Acquired deformity of the abdomen  M95 8                   Subjective: Patient reports that she had some shoulder pain when working on a craft project, involving prolonged writing/coloring, but not severe  She still has difficulty/discomfort with IR stretch behind back  She reports no provocation of abdominal pain following last session  Objective: See treatment diary below      Assessment: Progressed core stability exercises again with addition of bracing and kickouts  Instructed patient to increase hold times with shoulder stretches to maximize effectiveness of exercise  Patient demonstrated good form and range with resisted shoulder external rotation  Patient requires continued skilled PT to address all impairments and meet long term goals  Plan: Continue per plan of care, follow protocol  Precautions: DOS 22  Follow protocol  EPOC 22         Manuals 9/15 9/19 9/22 9/26 9/29 10/3       Shoulder PROM flexion and ER EVELIO EVELIO EVELIO EVELIO EVELIO EVELIO       STM UT and posterior shld EVELIO EVELIO EVELIO                       Neuro Re-Ed             Rows with retraction             Prone UL I             shld ext w/ retraction and core brace 2x10 rtb   3x10 rtb         ER with retraction 3x10 rtb   3x10 rtb  3x10 rtb       IR with retraction 3x10 rtb            Bent knee fall out    5 ea 10 ea 10 ea       Brace with kickout      10 ea       Brace with bridge     10x 10x       Diaphragmatic breathing 10x   10x 10x        Ther Ex             Supine flex AAROM             Pulley flex   10x5" 10x10" 10x10"        Pulley abd    10x10" 10x10"        Pt edu on plan of care, pathology, home program  10' edu and vital assessment 5'  2'        IR PROM with strap 5x10"  5x10"  5x10" 5x10"       Wall flex stretch 5x10"  5x10" 5x10" 5x10" 5x10"       Wall abd stretch    5x10" 5x10" 5x10"       Wand abd AAROM             shld abd dumbbell 10x 1#     10x 1#       shld flex dumbbell 10x 1#     10x 1#       Doorway ER 5x10"  10x5"  5x10" 5x10"                                 Cross body adduction   5x10"  5x10"        Ther Activity             Cones into high shelf     12x 12x       Gripper with proper shoulder posture     2x30" green 2x30" green       Gait Training                                       Modalities             Cold pack

## 2022-10-05 ENCOUNTER — OFFICE VISIT (OUTPATIENT)
Dept: PHYSICAL THERAPY | Facility: CLINIC | Age: 72
End: 2022-10-05
Payer: COMMERCIAL

## 2022-10-05 DIAGNOSIS — M25.511 CHRONIC RIGHT SHOULDER PAIN: ICD-10-CM

## 2022-10-05 DIAGNOSIS — M95.8: ICD-10-CM

## 2022-10-05 DIAGNOSIS — G89.29 CHRONIC RIGHT SHOULDER PAIN: ICD-10-CM

## 2022-10-05 DIAGNOSIS — Z98.890 S/P RIGHT ROTATOR CUFF REPAIR: Primary | ICD-10-CM

## 2022-10-05 PROCEDURE — 97140 MANUAL THERAPY 1/> REGIONS: CPT | Performed by: PHYSICAL THERAPIST

## 2022-10-05 PROCEDURE — 97112 NEUROMUSCULAR REEDUCATION: CPT | Performed by: PHYSICAL THERAPIST

## 2022-10-05 PROCEDURE — 97110 THERAPEUTIC EXERCISES: CPT | Performed by: PHYSICAL THERAPIST

## 2022-10-05 NOTE — PROGRESS NOTES
Daily Note    Today's date: 10/5/2022  Patient name: Kaela Song  : 1950  MRN: [id number]  Referring provider: Marily Novak MD  Dx:   Encounter Diagnosis     ICD-10-CM    1  S/P right rotator cuff repair  Z98 890    2  Chronic right shoulder pain  M25 511     G89 29    3  Acquired deformity of the abdomen  M95 8                   Subjective: Patient reports that she continues to tolerate therapy sessions without aggravation of abdominal pain  She is trying to use her R shoulder more for ADLs as instructed, like reaching behind back when bathing, but is still limited with ROM  Objective: See treatment diary below      Assessment: Patient noted fatigue with core stability exercises and required mild cuing, but no pain reported  Added diaphragm breathing and bent knee fall out to home program  Patient display quiver of deltoid and RTC musculature with flexion/abduction raises but no pain  Patient is progressing well with therapy and requires continued skilled PT to address all impairments and meet long term goals  Plan: Continue per plan of care, follow protocol  Precautions: DOS 22  Follow protocol  EPOC 22         Manuals 9/15 9/19 9/22 9/26 9/29 10/3 10/5      Shoulder PROM flexion and ER EVELIO EVELIO EVELIO EVELIO EVELIO EVELIO EVELIO      STM UT and posterior shld EVELIO EVELIO EVELIO                       Neuro Re-Ed             Rows with retraction             No money       2x10 rtb      shld ext w/ retraction and core brace 2x10 rtb   3x10 rtb   2x10 rtb      ER with retraction 3x10 rtb   3x10 rtb  3x10 rtb       IR with retraction 3x10 rtb            Bent knee fall out    5 ea 10 ea 10 ea 10 ea      Brace with kickout      10 ea 10 ea      Brace with bridge     10x 10x 10x      Diaphragmatic breathing 10x   10x 10x        Ther Ex             Supine flex AAROM             Pulley flex   10x5" 10x10" 10x10"        Pulley abd    10x10" 10x10"        Pt edu on plan of care, pathology, home program  10' edu and vital assessment 5'  2'        IR PROM with strap 5x10"  5x10"  5x10" 5x10" 5x10"      Wall flex stretch 5x10"  5x10" 5x10" 5x10" 5x10" 5x10"      Wall abd stretch    5x10" 5x10" 5x10" 5x10"      Wand abd AAROM             shld abd dumbbell 10x 1#     10x 1# 10x 1#      shld flex dumbbell 10x 1#     10x 1# 10x 1#      Doorway ER 5x10"  10x5"  5x10" 5x10" 5x10"                                Cross body adduction   5x10"  5x10"        Ther Activity             Cones into high shelf     12x 12x       Gripper with proper shoulder posture     2x30" green 2x30" green 3x30" blue      Gait Training                                       Modalities             Cold pack

## 2022-10-11 ENCOUNTER — APPOINTMENT (OUTPATIENT)
Dept: PHYSICAL THERAPY | Facility: CLINIC | Age: 72
End: 2022-10-11

## 2022-10-13 ENCOUNTER — APPOINTMENT (OUTPATIENT)
Dept: PHYSICAL THERAPY | Facility: CLINIC | Age: 72
End: 2022-10-13

## 2022-10-17 ENCOUNTER — OFFICE VISIT (OUTPATIENT)
Dept: PHYSICAL THERAPY | Facility: CLINIC | Age: 72
End: 2022-10-17
Payer: COMMERCIAL

## 2022-10-17 DIAGNOSIS — Z98.890 S/P RIGHT ROTATOR CUFF REPAIR: Primary | ICD-10-CM

## 2022-10-17 DIAGNOSIS — G89.29 CHRONIC RIGHT SHOULDER PAIN: ICD-10-CM

## 2022-10-17 DIAGNOSIS — M25.511 CHRONIC RIGHT SHOULDER PAIN: ICD-10-CM

## 2022-10-17 DIAGNOSIS — M95.8: ICD-10-CM

## 2022-10-17 PROCEDURE — 97112 NEUROMUSCULAR REEDUCATION: CPT | Performed by: PHYSICAL THERAPIST

## 2022-10-17 PROCEDURE — 97110 THERAPEUTIC EXERCISES: CPT | Performed by: PHYSICAL THERAPIST

## 2022-10-17 PROCEDURE — 97140 MANUAL THERAPY 1/> REGIONS: CPT | Performed by: PHYSICAL THERAPIST

## 2022-10-17 NOTE — PROGRESS NOTES
Progress Report    Today's date: 10/17/2022  Patient name: Sanam Cox  : 1950  MRN: [id number]  Referring provider: Candida Lma MD  Dx:   Encounter Diagnosis     ICD-10-CM    1  S/P right rotator cuff repair  Z98 890    2  Chronic right shoulder pain  M25 511     G89 29    3  Acquired deformity of the abdomen  M95 8                   Subjective: Patient reports that she hasn't been able to do as many exercises recently due to having trouble with her asthma  She is going to follow up with her pulmonologist  She reports that she had virtual appointment with surgeon who was pleased with progress and said she can continue with PT as needed, no specific restrictions for shoulder except to do things as tolerated, avoiding provoking shoulder pain  In regard to her shoulder, she reports only mild shoulder pain anymore with certain positions at night  She still avoids lifting heavy things with her shoulder and vacuuming  Otherwise her shoulders are feeling pretty symmetrical  In regard to her low back and abdominal pain, she hasn't had pain lately  No aggravation recently with her abdominal symptoms, but she hasn't tried dynamic activities like shaking dishes dry  Objective: See treatment diary below    R shoulder AROM  T10 IR reach back  T2 ER reach back  135 Flexion   138 abduction    4+ flexion/abduction MMT    Mild restriction with lumbar extension AROM    Good core activation with bridging, TA contraction    Assessment: Patient has been seen for nearly 4 months following RTC repair of R shoulder, and 1 month for abdominal wall pain/strain  Patient has made good objective and functional progress in regard to shoulder, with gradual improvements in shoulder ROM and strength on a weekly basis  She still displays some end range of motion restrictions and mild weakness, which limits with heavier household chores   Patient also demonstrates progress with rehab for abdominal wall strain, tolerating more challenging exercises each session without aggravation of symptoms, and improvement in lumbar AROM  At this time, patient would benefit from 3-4 more weeks of PT to further progress upon impairments and meet longer term goals  Plan: Continue per plan of care, follow protocol  Progress core stability exercises as tolerated  Precautions: DOS 6/9/22  Follow protocol  EPOC 11/11/22         Manuals 9/15 9/19 9/22 9/26 9/29 10/3 10/5 10/17     Shoulder PROM flexion and ER EVELIO EVELIO EVELIO EVELIO EVELIO EVELIO EVELIO EVELIO     STM UT and posterior shld EVELIO EVELIO EVELIO                       Neuro Re-Ed             Rows with retraction             No money       2x10 rtb      shld ext w/ retraction and core brace 2x10 rtb   3x10 rtb   2x10 rtb      ER with retraction 3x10 rtb   3x10 rtb  3x10 rtb  2x10 rtb     IR with retraction 3x10 rtb            Bent knee fall out    5 ea 10 ea 10 ea 10 ea 10 ea     Brace with kickout      10 ea 10 ea 10 ea     paloff press        10x gtb     Brace with bridge     10x 10x 10x 10x     Diaphragmatic breathing 10x   10x 10x        Ther Ex             Supine flex AAROM             Pulley flex   10x5" 10x10" 10x10"        Pulley abd    10x10" 10x10"        Pt edu on plan of care, pathology, home program  10' edu and vital assessment 5'  2'   3'     IR PROM with strap 5x10"  5x10"  5x10" 5x10" 5x10" 5x10"     Wall flex stretch 5x10"  5x10" 5x10" 5x10" 5x10" 5x10" 5x10"     Wall abd stretch    5x10" 5x10" 5x10" 5x10" 5x10  "     Wand abd AAROM             shld abd dumbbell 10x 1#     10x 1# 10x 1#      shld flex dumbbell 10x 1#     10x 1# 10x 1#      Doorway ER 5x10"  10x5"  5x10" 5x10" 5x10"      Assessment associated with progress report        10'                  Cross body adduction   5x10"  5x10"        Ther Activity             Cones into high shelf     12x 12x       Gripper with proper shoulder posture     2x30" green 2x30" green 3x30" blue      Gait Training                                       Modalities Cold pack

## 2022-10-20 ENCOUNTER — OFFICE VISIT (OUTPATIENT)
Dept: PHYSICAL THERAPY | Facility: CLINIC | Age: 72
End: 2022-10-20
Payer: COMMERCIAL

## 2022-10-20 DIAGNOSIS — M25.511 CHRONIC RIGHT SHOULDER PAIN: ICD-10-CM

## 2022-10-20 DIAGNOSIS — Z98.890 S/P RIGHT ROTATOR CUFF REPAIR: Primary | ICD-10-CM

## 2022-10-20 DIAGNOSIS — M95.8: ICD-10-CM

## 2022-10-20 DIAGNOSIS — G89.29 CHRONIC RIGHT SHOULDER PAIN: ICD-10-CM

## 2022-10-20 PROCEDURE — 97112 NEUROMUSCULAR REEDUCATION: CPT | Performed by: PHYSICAL THERAPIST

## 2022-10-20 PROCEDURE — 97110 THERAPEUTIC EXERCISES: CPT | Performed by: PHYSICAL THERAPIST

## 2022-10-20 PROCEDURE — 97140 MANUAL THERAPY 1/> REGIONS: CPT | Performed by: PHYSICAL THERAPIST

## 2022-10-20 NOTE — PROGRESS NOTES
Daily Note    Today's date: 10/20/2022  Patient name: Sharon Boxer  : 1950  MRN: [id number]  Referring provider: Aaliyah Portillo MD  Dx:   Encounter Diagnosis     ICD-10-CM    1  S/P right rotator cuff repair  Z98 890    2  Chronic right shoulder pain  M25 511     G89 29    3  Acquired deformity of the abdomen  M95 8                    Subjective: Patient reports no aggravation of abdominal pain following last session  Objective: See treatment diary below      Assessment: Progressed core strengthening with addition of diagonal med ball lift with core bracing  She noted appropriate muscle fatigue but no pain during  Progressed home program with addition of resisted ER and shld ext with retraction  Patient is progressing well with therapy and would benefit from continued skilled PT per plan of care  Plan: Continue per plan of care, follow protocol  Progress core stability exercises as tolerated  Precautions: DOS 22  Follow protocol  EPOC 22         Manuals 9/15 9/19 9/22 9/26 9/29 10/3 10/5 10/17 10/20    Shoulder PROM flexion and ER EVELIO EVELIO EVELIO EVELIO EVELIO EVELIO EVELIO EVELIO EVELIO    STM UT and posterior shld EVELIO EVELIO EVELIO                       Neuro Re-Ed             Rows with retraction             No money       2x10 rtb      shld ext w/ retraction and core brace 2x10 rtb   3x10 rtb   2x10 rtb  2x10 rtb    ER with retraction 3x10 rtb   3x10 rtb  3x10 rtb  2x10 rtb     IR with retraction 3x10 rtb            Bent knee fall out    5 ea 10 ea 10 ea 10 ea 10 ea     Brace with kickout      10 ea 10 ea 10 ea     paloff press        10x gtb 10x gtb    Diagonal ball raise pnf         5x red ball    Brace with bridge     10x 10x 10x 10x     Diaphragmatic breathing 10x   10x 10x        Ther Ex             Pulley flex   10x5" 10x10" 10x10"        Pulley abd    10x10" 10x10"        Pt edu on plan of care, pathology, home program  8' edu and vital assessment 5'  2'   3'     IR PROM with strap 5x10"  5x10"  5x10" 5x10" 5x10" 5x10" 5x10"    Wall flex stretch 5x10"  5x10" 5x10" 5x10" 5x10" 5x10" 5x10" 5x10"    Wall abd stretch    5x10" 5x10" 5x10" 5x10" 5x10  " 5x10"    shld abd dumbbell 10x 1#     10x 1# 10x 1#  10x 1#    shld flex dumbbell 10x 1#     10x 1# 10x 1#  10x 1#    Doorway ER 5x10"  10x5"  5x10" 5x10" 5x10"                   Cross body adduction   5x10"  5x10"    5x10"    Ther Activity             Cones into high shelf     12x 12x   1# 10x    Gripper with proper shoulder posture     2x30" green 2x30" green 3x30" blue      Gait Training                                       Modalities             Cold pack

## 2022-10-21 ENCOUNTER — TELEPHONE (OUTPATIENT)
Dept: PULMONOLOGY | Facility: CLINIC | Age: 72
End: 2022-10-21

## 2022-10-21 DIAGNOSIS — J45.31 MILD PERSISTENT ASTHMA WITH ACUTE BRONCHITIS AND ACUTE EXACERBATION: Primary | ICD-10-CM

## 2022-10-21 DIAGNOSIS — J20.9 MILD PERSISTENT ASTHMA WITH ACUTE BRONCHITIS AND ACUTE EXACERBATION: Primary | ICD-10-CM

## 2022-10-21 RX ORDER — PREDNISONE 10 MG/1
10 TABLET ORAL DAILY
Qty: 30 TABLET | Refills: 0 | Status: SHIPPED | OUTPATIENT
Start: 2022-10-21 | End: 2022-11-30 | Stop reason: SDUPTHER

## 2022-10-21 NOTE — TELEPHONE ENCOUNTER
Called pt back, she would like to prednisone to be sent to [facility] in [facility], its the 3rd pharmacy on her list

## 2022-10-21 NOTE — TELEPHONE ENCOUNTER
Pt called re: she had her driveway seal coated on 9/24/22 which is when her Asthma started giving her more issues  She is SOB, in the AM she coughs and is more congested  She states she is on Breo once a day and takes Flonase twice a day for her congestion  She's also been using her rescue inhaler 2-3 times a day  She's not sure if she should be doing anything else or something different? She is currently in the [facility] so if medication is needed she will need to provide a different pharmacy    Please advise: [phone]

## 2022-10-24 ENCOUNTER — OFFICE VISIT (OUTPATIENT)
Dept: PHYSICAL THERAPY | Facility: CLINIC | Age: 72
End: 2022-10-24
Payer: COMMERCIAL

## 2022-10-24 DIAGNOSIS — M95.8: ICD-10-CM

## 2022-10-24 DIAGNOSIS — Z98.890 S/P RIGHT ROTATOR CUFF REPAIR: Primary | ICD-10-CM

## 2022-10-24 DIAGNOSIS — G89.29 CHRONIC RIGHT SHOULDER PAIN: ICD-10-CM

## 2022-10-24 DIAGNOSIS — M25.511 CHRONIC RIGHT SHOULDER PAIN: ICD-10-CM

## 2022-10-24 PROCEDURE — 97140 MANUAL THERAPY 1/> REGIONS: CPT | Performed by: PHYSICAL THERAPIST

## 2022-10-24 PROCEDURE — 97110 THERAPEUTIC EXERCISES: CPT | Performed by: PHYSICAL THERAPIST

## 2022-10-24 PROCEDURE — 97112 NEUROMUSCULAR REEDUCATION: CPT | Performed by: PHYSICAL THERAPIST

## 2022-10-24 NOTE — PROGRESS NOTES
Daily Note    Today's date: 10/24/2022  Patient name: Lamont Carson  : 1950  MRN: [id number]  Referring provider: Madi Carranza MD  Dx:   Encounter Diagnosis     ICD-10-CM    1  S/P right rotator cuff repair  Z98 890    2  Chronic right shoulder pain  M25 511     G89 29    3  Acquired deformity of the abdomen  M95 8                    Subjective: Patient reports no shoulder pain when sleeping recently and she has been using it with household chores  She also tried updated home program with theraband ER and shld ext  She also thinks her abdominal area is getting stronger as she hasn't had any flare ups of pain with exercises  Objective: See treatment diary below      Assessment: Patient demonstrated good shoulder mobility with passive stretching today, with no discomfort and only mild restrictions at end range for all directions, greatest mobility to date  Progressed abdominal strengthening with addition of theratubing resistance for pnf patterns  Patient noted fatigue and required cues for correct muscle recruitment, but no pain  Patient is progressing well with therapy and would benefit from continued skilled PT per plan of care  Plan: Continue per plan of care, follow protocol  Progress core stability exercises as tolerated  Transition to 1x per week treatment next week  Precautions: DOS 22  Follow protocol  EPOC 22         Manuals 9/15 9/19 9/22 9/26 9/29 10/3 10/5 10/17 10/20 10/24   Shoulder PROM flexion and ER EVELIO EVELIO EVELIO EVELIO EVELIO EVELIO EVELIO EVELIO EVELIO EVELIO   STM UT and posterior shld EVELIO EVELIO EVELIO                       Neuro Re-Ed             Rows with retraction             No money       2x10 rtb      shld ext w/ retraction and core brace 2x10 rtb   3x10 rtb   2x10 rtb  2x10 rtb 2x10 rtb   ER with retraction 3x10 rtb   3x10 rtb  3x10 rtb  2x10 rtb     IR with retraction 3x10 rtb            Bent knee fall out    5 ea 10 ea 10 ea 10 ea 10 ea     Brace with kickout      10 ea 10 ea 10 ea     paloff press        10x gtb 10x gtb 10x ea gtb   Diagonal low to high          5x ea rtb   Diagonal ball raise pnf         5x red ball 10x ea rtb   Brace with bridge     10x 10x 10x 10x     Diaphragmatic breathing 10x   10x 10x        Ther Ex             Pulley flex   10x5" 10x10" 10x10"        Pulley abd    10x10" 10x10"        Pt edu on plan of care, pathology, home program  8' edu and vital assessment 5'  2'   3'     IR PROM with strap 5x10"  5x10"  5x10" 5x10" 5x10" 5x10" 5x10" 5x10"   Wall flex stretch 5x10"  5x10" 5x10" 5x10" 5x10" 5x10" 5x10" 5x10"    Wall abd stretch    5x10" 5x10" 5x10" 5x10" 5x10  " 5x10"    shld abd dumbbell 10x 1#     10x 1# 10x 1#  10x 1# 2x10 1#   shld flex dumbbell 10x 1#     10x 1# 10x 1#  10x 1# 2x10 1#   Doorway ER 5x10"  10x5"  5x10" 5x10" 5x10"                   Cross body adduction   5x10"  5x10"    5x10"    Ther Activity             Cones into high shelf     12x 12x   1# 10x 2x10 1#   Gripper with proper shoulder posture     2x30" green 2x30" green 3x30" blue      Gait Training                                       Modalities             Cold pack

## 2022-10-25 ENCOUNTER — TELEPHONE (OUTPATIENT)
Dept: PULMONOLOGY | Facility: CLINIC | Age: 72
End: 2022-10-25

## 2022-10-25 NOTE — TELEPHONE ENCOUNTER
Patient called asking when she is due for her next follow up visit  Her last visit was 9/2/22, but it is not noted of when she should return   Please advise time frame so we can get the patient scheduled appropriately

## 2022-10-25 NOTE — TELEPHONE ENCOUNTER
In reviewing the chart, it appears the pt follow ups every 3-4 months  Please schedule pt for December/January   Thank you

## 2022-10-26 DIAGNOSIS — J45.30 MILD PERSISTENT ASTHMA WITHOUT COMPLICATION: ICD-10-CM

## 2022-10-27 ENCOUNTER — OFFICE VISIT (OUTPATIENT)
Dept: PHYSICAL THERAPY | Facility: CLINIC | Age: 72
End: 2022-10-27
Payer: COMMERCIAL

## 2022-10-27 DIAGNOSIS — Z98.890 S/P RIGHT ROTATOR CUFF REPAIR: Primary | ICD-10-CM

## 2022-10-27 DIAGNOSIS — G89.29 CHRONIC RIGHT SHOULDER PAIN: ICD-10-CM

## 2022-10-27 DIAGNOSIS — M25.511 CHRONIC RIGHT SHOULDER PAIN: ICD-10-CM

## 2022-10-27 DIAGNOSIS — M95.8: ICD-10-CM

## 2022-10-27 PROCEDURE — 97110 THERAPEUTIC EXERCISES: CPT | Performed by: PHYSICAL THERAPIST

## 2022-10-27 PROCEDURE — 97140 MANUAL THERAPY 1/> REGIONS: CPT | Performed by: PHYSICAL THERAPIST

## 2022-10-27 PROCEDURE — 97112 NEUROMUSCULAR REEDUCATION: CPT | Performed by: PHYSICAL THERAPIST

## 2022-10-27 NOTE — PROGRESS NOTES
Daily Note    Today's date: 10/27/2022  Patient name: Shantell Segovia  : 1950  MRN: [id number]  Referring provider: Edwin Sawant MD  Dx:   Encounter Diagnosis     ICD-10-CM    1  S/P right rotator cuff repair  Z98 890    2  Chronic right shoulder pain  M25 511     G89 29    3  Acquired deformity of the abdomen  M95 8                    Subjective: Patient reports that her shoulder has been feeling good  However, yesterday she had to carry groceries (about 6lbs worth in each hand) for 5 minutes from car to house  She had provocation of sternum pain, which radiated into upper abdomen, as she has had before, leading to referral to PT  This has mostly subsided but now she still has abdominal pain at level of xiphoid process  Objective: See treatment diary below      Assessment: Instructed patient in proper shoulder posture with farmer's carry with core brace, to simulate and improve tolerance to carrying groceries  Patient tolerated diagonal core strengthening exercises well without provocation of symptoms  Patient is progressing well with therapy and would benefit from continued skilled PT per plan of care  Plan: Continue per plan of care, follow protocol  Progress core stability exercises as tolerated  Transition to 1x per week treatment next week  Precautions: DOS 22  Follow protocol  EPOC 22         Manuals 9/15 9/19 9/22 9/26 9/29 10/3 10/5 10/17 10/20 10/24 10/27   Shoulder PROM flexion and ER EVELIO EVELIO EVELIO EVELIO EVELIO EVELIO EVELIO EVELIO EVELIO EVELIO EVELIO   STM UT and posterior shld EVELIO EVELIO EVELIO                         Neuro Re-Ed              Rows with retraction              No money       2x10 rtb       shld ext w/ retraction and core brace 2x10 rtb   3x10 rtb   2x10 rtb  2x10 rtb 2x10 rtb    ER with retraction 3x10 rtb   3x10 rtb  3x10 rtb  2x10 rtb      Bent knee fall out    5 ea 10 ea 10 ea 10 ea 10 ea      Brace with kickout      10 ea 10 ea 10 ea      paloff press        10x gtb 10x gtb 10x ea gtb 10x ea gtb Diagonal low to high          5x ea rtb 10x ea rtb   Diagonal ball raise pnf         5x red ball 10x ea rtb 10x ea rtb   Brace with bridge     10x 10x 10x 10x      Diaphragmatic breathing 10x   10x 10x      10x   Ther Ex              Pulley flex   10x5" 10x10" 10x10"         Pulley abd    10x10" 10x10"         Pt edu on plan of care, pathology, home program  8' edu and vital assessment 5'  2'   3'   5'   IR PROM with strap 5x10"  5x10"  5x10" 5x10" 5x10" 5x10" 5x10" 5x10" 5x10"   Wall flex stretch 5x10"  5x10" 5x10" 5x10" 5x10" 5x10" 5x10" 5x10"     Wall abd stretch    5x10" 5x10" 5x10" 5x10" 5x10  " 5x10"     shld abd dumbbell 10x 1#     10x 1# 10x 1#  10x 1# 2x10 1# 10x 2#   shld flex dumbbell 10x 1#     10x 1# 10x 1#  10x 1# 2x10 1# 10x 2#   Doorway ER 5x10"  10x5"  5x10" 5x10" 5x10"                     Cross body adduction   5x10"  5x10"    5x10"     Ther Activity              Cones into high shelf     12x 12x   1# 10x 2x10 1#    Gripper with proper shoulder posture     2x30" green 2x30" green 3x30" blue       Gait Training                                          Modalities              Cold pack

## 2022-10-31 ENCOUNTER — APPOINTMENT (OUTPATIENT)
Dept: PHYSICAL THERAPY | Facility: CLINIC | Age: 72
End: 2022-10-31

## 2022-11-03 ENCOUNTER — OFFICE VISIT (OUTPATIENT)
Dept: PHYSICAL THERAPY | Facility: CLINIC | Age: 72
End: 2022-11-03

## 2022-11-03 DIAGNOSIS — Z98.890 S/P RIGHT ROTATOR CUFF REPAIR: Primary | ICD-10-CM

## 2022-11-03 DIAGNOSIS — M25.511 CHRONIC RIGHT SHOULDER PAIN: ICD-10-CM

## 2022-11-03 DIAGNOSIS — G89.29 CHRONIC RIGHT SHOULDER PAIN: ICD-10-CM

## 2022-11-03 DIAGNOSIS — M95.8: ICD-10-CM

## 2022-11-03 NOTE — PROGRESS NOTES
Daily Note    Today's date: 11/3/2022  Patient name: Berta Locke  : 1950  MRN: [id number]  Referring provider: Joaquin Robison MD  Dx:   Encounter Diagnosis     ICD-10-CM    1  S/P right rotator cuff repair  Z98 890    2  Chronic right shoulder pain  M25 511     G89 29    3  Acquired deformity of the abdomen  M95 8                    Subjective: Patient reports that that her shoulder feels normal now  She also reports that she had some abdominal soreness following doing some chores, but if she put ice on it, it would reduce quickly  She feels in general, the abdominal pain has improved since starting PT  Objective: See treatment diary below      Assessment: Instructed patient in final home program for management of RTC repair to maintain and further improve shoulder mobility and strength  She verbalized understanding to home program  Progressed core/abdominal strengthening with [place] motion using 5lb weight  She tolerated diagonal strengthening exercises without provocation of abdominal pain  Patient would benefit from continued treatment of abdominal pain and discharge to home program for shoulder  Plan: Continue per plan of care, follow protocol  Progress core stability exercises as tolerated  Transition to 1x per week treatment next week  Precautions: DOS 22  Follow protocol  EPOC 22         Manuals 11/3       10/17 10/20 10/24 10/27   Shoulder PROM flexion and ER EVELIO       EVELIO EVELIO EVELIO EVELIO   STM UT and posterior shld                            Neuro Re-Ed              Rows with retraction              No money 2x10 rtb             shld ext w/ retraction and core brace         2x10 rtb 2x10 rtb    ER with retraction        2x10 rtb      Bent knee fall out        10 ea      Brace with kickout        10 ea      paloff press 10x rtb       10x gtb 10x gtb 10x ea gtb 10x ea gtb   Diagonal low to high 10x ea rtb         5x ea rtb 10x ea rtb   Diagonal low to high 10x ea rtb        5x red ball 10x ea rtb 10x ea rtb   Brace with bridge        10x      Diaphragmatic breathing           10x   Ther Ex              Pulley flex              Pulley abd              Pt edu on plan of care, pathology, home program 5'       3'   5'   IR PROM with strap 5x10"       5x10" 5x10" 5x10" 5x10"   Wall flex stretch 5x10"       5x10" 5x10"     Wall abd stretch 5x10"       5x10  " 5x10"     shld abd dumbbell 10x 2#        10x 1# 2x10 1# 10x 2#   shld flex dumbbell 10x 2#        10x 1# 2x10 1# 10x 2#   Doorway ER                            Cross body adduction         5x10"     Ther Activity              Cones into high shelf         1# 10x 2x10 1#    Circles at countertop with # 5 ea 5#             Gripper with proper shoulder posture              Gait Training                                          Modalities              Cold pack

## 2022-11-10 ENCOUNTER — OFFICE VISIT (OUTPATIENT)
Dept: PHYSICAL THERAPY | Facility: CLINIC | Age: 72
End: 2022-11-10

## 2022-11-10 DIAGNOSIS — M95.8: Primary | ICD-10-CM

## 2022-11-10 NOTE — PROGRESS NOTES
Daily Note    Today's date: 11/10/2022  Patient name: Yu Veloz  : 1950  MRN: [id number]  Referring provider: Pratima Choudhary MD  Dx:   Encounter Diagnosis     ICD-10-CM    1  Acquired deformity of the abdomen  M95 8                    Subjective: Patient reports that she hasn't had any abdominal pain over the past week  She has been consistent with home exercises  She feels comfortable with discharge to home program at this time  Objective: See treatment diary below      Assessment: Progressed core exercises with addition of farmer's carry and body blade with core bracing  Patient tolerated well with appropriate muscle fatigue  Patient demonstrated good form and breathing technique with home program  Instructed patient in proper body mechanics and core bracing for activities like shaking toaster or vacumming, final home program for abdominal strain, and discharge recommendations  She verbalized understanding to all instructions and is pleased with her care  At this time, patient is appropriate for discharge to home program to maintain improvements and further progress upon impairments  Plan: Discharge to home program     Precautions: DOS 22  Follow protocol  EPOC 22         Manuals 11/3 11/10      10/17 10/20 10/24 10/27                               Neuro Re-Ed              Body blade horizontal  1x30"            Body blade vertical  2x20"            Bent knee fall out        10 ea      Brace with kickout        10 ea      paloff press 10x rtb 10x rtb      10x gtb 10x gtb 10x ea gtb 10x ea gtb   Diagonal low to high 10x ea rtb 10x ea rtb        5x ea rtb 10x ea rtb   Diagonal low to high 10x ea rtb 10x ea rtb       5x red ball 10x ea rtb 10x ea rtb   Dead bug lvl 2  10 ea            Brace with bridge  15x      10x      Diaphragmatic breathing  15x         10x   Ther Ex                                          Pt edu on plan of care, pathology, home program 5' 10'      3'   5' Ther Activity              Circles at countertop with # 5 ea 5# 5 ea 5#            UL farmer's carry  2x30 ft 5#            Gait Training                                          Modalities              Cold pack

## 2022-11-11 ENCOUNTER — TELEPHONE (OUTPATIENT)
Dept: PULMONOLOGY | Facility: CLINIC | Age: 72
End: 2022-11-11

## 2022-11-14 NOTE — TELEPHONE ENCOUNTER
I attempted to initiate a prior auth but was prompted that the medication is covered  I called pharmacy and was informed that he medication is covered and they are preparing it for the pt   I called the pt and left her a VM informing her

## 2022-11-18 NOTE — TELEPHONE ENCOUNTER
Arabella Post  to Tati Ji DO    CD      12:34 PM  Dr Blair James,  I have needed to use my Albuterol inhaler once or twice for the last nine days  I used it again this morning at 10:24 after using Flonase at 9:39 and Breo Ellipta 200/25 at 9:21  I still feel short of breath and will use Albuterol again after 4 hours  In the past, when my breathing was like this, I've needed to use Albuterol every four hours when I'm having breathing problems to have improvement  I am very congested  I am trying not to talk much today because talking affects my breathing  On February 15, I am scheduled to have a 24 hour PH and Manometry test to hopefully identify why my acid reflux has become severe when I sleep  During the day, I don't have reflux symptoms unless I bend over  The test prep requires that I don't drink or eat anything the midnight before the test, for morning testing  I always seem to be congested and drink lots of water especially after using Flonase Sensimist or 12 hour Mucinex  What procedure do you think I should follow to improve? I felt the same yesterday and hoped I'd feel better today  Lupe Azul               12:36 PM  You routed this conversation to [employer],      February 3, 2022    DO waleska Singh Cecelia          11:40 AM  I would suggest using the albuterol more frequently for the about 72 hours  If still no improvement we may need to add prednisone for a few days  Perhaps the acid reflux is making the asthma worse, see what the testing shows  Last read by Gloria Rice at 10:47 AM on 2/8/2022 
Call patient  No answer  Left message on machine stating that she can take Zofran with the prednisone  Also advised to take prednisone with food to help prevent nausea 
Called and spoke with Geovanny Mcnally  She said she did send in an update last week and has been following Dr Salomón Allen' recommendations  She increased use of her Albuterol inhaler, but she is still having SOB  The inhaler is helping her breathing for a short time, but she needs to use the inhaler every 4 hours  She has a little chest tightness when she becomes SOB  Denies cough and wheeze  She said Dr Saloómn Allen said she may need prednisone  Please advise 
Called in and stated she is still having shortness of breath when using the Albuterol inhaler 3 times a day  Also taking Breo 200/25  Mona Russo Has been like this for more then a few weeks, She is going to send a message through the portal to give you a more in depth description of what is going on  But she was wondering if she should get prescribed Prednisone and try that  Please advise 
Patient aware 
Please let patient know I sent prednisone 40 mg x 5 days to the pharmacy for her
Pt called back stating she started taking the prednisone and last night she started with an upset stomach  She still has one today and she stated that this happens to her when taking certain medications  She said last time this happened her PCP prescribed Zofran and wanted to know if we could send a script for Zofran to [facility] in [place]? Please advise: [phone]    10:22AM: Pt called back and stated she found an old script of the Zofran w/ 7 pills left so she wouldn't need a refill  She would like to know if she could take the Zofran while taking the prednisone?   Please advise: [phone]
(3) no apparent problem

## 2022-11-30 ENCOUNTER — TELEPHONE (OUTPATIENT)
Dept: PULMONOLOGY | Facility: CLINIC | Age: 72
End: 2022-11-30

## 2022-11-30 DIAGNOSIS — J20.9 MILD PERSISTENT ASTHMA WITH ACUTE BRONCHITIS AND ACUTE EXACERBATION: ICD-10-CM

## 2022-11-30 DIAGNOSIS — J45.31 MILD PERSISTENT ASTHMA WITH ACUTE BRONCHITIS AND ACUTE EXACERBATION: ICD-10-CM

## 2022-11-30 RX ORDER — PREDNISONE 10 MG/1
10 TABLET ORAL DAILY
Qty: 30 TABLET | Refills: 0 | Status: SHIPPED | OUTPATIENT
Start: 2022-11-30 | End: 2023-01-17

## 2022-11-30 NOTE — TELEPHONE ENCOUNTER
Patient calling saying she is having issues with her asthma again and is more short of breath even using her inhalers and nebulizer's  Please advise

## 2022-11-30 NOTE — TELEPHONE ENCOUNTER
[facility] would like a return call regarding     What is the reason for the call/chief complaint? ASTHMA    What additional symptoms are present or absent? SOB, yes   Are they on O2? No Pulse O2 restingN/A When walkingN/A   chest pain/tightness, yes  wheezing, no  Cough, yes  mucous production,no  What color is it N/A  fevers/chills, no  weight gain, no  Swelling no  Pain no, does it hurt when breathing or all the time? NO    When did they start/how long have they been going on? 11/18/2022    Constant or intermittent; if intermittent describe yes? What makes it better or worse? N/A    Have you been exposed to anyone that is sick? No    Have you been tested for COVID recently? no    Check current pulmonary medications INHALER   frequency of use DAILY    Have they had any other treatment or testing for this problem elsewhere? NO    Recent steroids? No    Recent Antibiotics? No     Last office visit? 9/2/2022    Patient pharmacy?  [facility]/pharmacy #[id number] - [address] ,[address]   [facility]   Phone:  [phone]  Fax:  [phone]    30 or 90 day supply

## 2022-11-30 NOTE — TELEPHONE ENCOUNTER
Please advise patient prednisone was sent to the pharmacy and to call back if no improvement in next 24-48 H

## 2022-12-12 ENCOUNTER — HOSPITAL ENCOUNTER (OUTPATIENT)
Dept: RADIOLOGY | Facility: HOSPITAL | Age: 72
Discharge: HOME | End: 2022-12-12

## 2022-12-12 ENCOUNTER — OFFICE VISIT (OUTPATIENT)
Dept: PULMONOLOGY | Facility: HOSPITAL | Age: 72
End: 2022-12-12

## 2022-12-12 VITALS
HEART RATE: 88 BPM | TEMPERATURE: 98.8 F | BODY MASS INDEX: 21.83 KG/M2 | WEIGHT: 115.6 LBS | OXYGEN SATURATION: 98 % | SYSTOLIC BLOOD PRESSURE: 150 MMHG | HEIGHT: 61 IN | DIASTOLIC BLOOD PRESSURE: 64 MMHG

## 2022-12-12 DIAGNOSIS — J45.31 MILD PERSISTENT ASTHMA WITH ACUTE BRONCHITIS AND ACUTE EXACERBATION: Primary | ICD-10-CM

## 2022-12-12 DIAGNOSIS — J20.9 MILD PERSISTENT ASTHMA WITH ACUTE BRONCHITIS AND ACUTE EXACERBATION: Primary | ICD-10-CM

## 2022-12-12 DIAGNOSIS — R06.02 SHORTNESS OF BREATH: ICD-10-CM

## 2022-12-12 DIAGNOSIS — J45.50 SEVERE PERSISTENT ASTHMA WITHOUT COMPLICATION: ICD-10-CM

## 2022-12-12 RX ORDER — RALOXIFENE HYDROCHLORIDE 60 MG/1
60 TABLET, FILM COATED ORAL DAILY
COMMUNITY
Start: 2022-10-04

## 2022-12-12 RX ORDER — RALOXIFENE HYDROCHLORIDE 60 MG/1
60 TABLET, FILM COATED ORAL DAILY
COMMUNITY
Start: 2022-11-01

## 2022-12-12 RX ORDER — DOXYCYCLINE HYCLATE 20 MG
TABLET ORAL
COMMUNITY
Start: 2022-12-08

## 2022-12-12 RX ORDER — MONTELUKAST SODIUM 10 MG/1
10 TABLET ORAL
Qty: 30 TABLET | Refills: 6 | Status: SHIPPED | OUTPATIENT
Start: 2022-12-12

## 2022-12-12 NOTE — PROGRESS NOTES
Assessment & Plan:      1  Mild persistent asthma with acute bronchitis and acute exacerbation  CBC and differential    montelukast (SINGULAIR) 10 mg tablet      2  Severe persistent asthma without complication        3  Shortness of breath  XR chest pa & lateral        Patient presenting for sick visit  She is not actively wheezing on exam and recently completed course of steroids  Patient is hesitant to go on any antibiotics given history of different medication sensitivities  Seeing as she is stable, we can monitor off steroids or antibiotics at this time  I do recommend a chest x-ray given her prolonged symptoms (at least 4 weeks)  It seems that her trigger is likely allergic in nature, will start Singulair  Potential side effects discussed  Continue Breo 1 puff daily, albuterol as needed  Obtain CBC with differential once off steroids for at least 2 weeks to evaluate absolute eosinophil levels  Continue GERD treatment per PCP/GI  We discussed at uncontrolled acid reflux could contribute to worsening asthmatic symptoms  Subjective:     Patient ID: Shanon Aaron is a 74 y o  female  Chief Complaint:    Shital Espinosa is a 78-year-old female presenting for sick visit  She says her breathing has been worse since the end of October when she had her driveway paid and was exposed to strong chemical fumes  She was treated with a course of steroids at that time with some initial improvement but then again worsened  She notes that she is frequently triggered if she is exposed to strong fragrances or smells  Her issues can also be seasonal   She reports compliance with sucralfate  The following portions of the patient's history were reviewed in this encounter and updated as appropriate: Past medical, social, surgical, family, allergies    Review of Systems   Respiratory: Positive for cough and shortness of breath  All other systems reviewed and are negative          Objective:    Physical Exam  Vitals reviewed  Constitutional:       General: She is not in acute distress  Appearance: She is not toxic-appearing  HENT:      Head: Normocephalic and atraumatic  Eyes:      General: No scleral icterus  Cardiovascular:      Rate and Rhythm: Normal rate and regular rhythm  Pulmonary:      Effort: Pulmonary effort is normal       Breath sounds: Normal breath sounds  Skin:     General: Skin is warm and dry  Neurological:      General: No focal deficit present  Mental Status: She is alert  Mental status is at baseline  Psychiatric:         Mood and Affect: Mood is anxious  Lab Review:   No visits with results within 2 Month(s) from this visit     Latest known visit with results is:   Admission on 09/17/2022, Discharged on 09/17/2022   Component Date Value   • WBC 09/17/2022 7 23    • RBC 09/17/2022 4 35    • Hemoglobin 09/17/2022 13 4    • Hematocrit 09/17/2022 38 9    • MCV 09/17/2022 89    • MCH 09/17/2022 30 8    • MCHC 09/17/2022 34 4    • RDW 09/17/2022 12 3    • MPV 09/17/2022 9 0    • Platelets 74/41/8317 198    • nRBC 09/17/2022 0    • Neutrophils Relative 09/17/2022 59    • Immat GRANS % 09/17/2022 0    • Lymphocytes Relative 09/17/2022 28    • Monocytes Relative 09/17/2022 11    • Eosinophils Relative 09/17/2022 1    • Basophils Relative 09/17/2022 1    • Neutrophils Absolute 09/17/2022 4 25    • Immature Grans Absolute 09/17/2022 0 03    • Lymphocytes Absolute 09/17/2022 2 00    • Monocytes Absolute 09/17/2022 0 81    • Eosinophils Absolute 09/17/2022 0 08    • Basophils Absolute 09/17/2022 0 06    • Sodium 09/17/2022 133 (L)    • Potassium 09/17/2022 4 1    • Chloride 09/17/2022 98    • CO2 09/17/2022 28    • ANION GAP 09/17/2022 7    • BUN 09/17/2022 18    • Creatinine 09/17/2022 0 61    • Glucose 09/17/2022 95    • Calcium 09/17/2022 9 3    • AST 09/17/2022 19    • ALT 09/17/2022 27    • Alkaline Phosphatase 09/17/2022 89    • Total Protein 09/17/2022 6 7    • Albumin 09/17/2022 3  8    • Total Bilirubin 09/17/2022 0 30    • eGFR 09/17/2022 89    • hs TnI 0hr 09/17/2022 3    • Ventricular Rate 09/17/2022 71    • Atrial Rate 09/17/2022 71    • AR Interval 09/17/2022 164    • QRSD Interval 09/17/2022 92    • QT Interval 09/17/2022 390    • QTC Interval 09/17/2022 423    • P Axis 09/17/2022 71    • QRS Axis 09/17/2022 92    • T Wave Axis 09/17/2022 68    • hs TnI 2hr 09/17/2022 4    • Delta 2hr hsTnI 09/17/2022 1    • Ventricular Rate 09/17/2022 65    • Atrial Rate 09/17/2022 65    • AR Interval 09/17/2022 166    • QRSD Interval 09/17/2022 92    • QT Interval 09/17/2022 402    • QTC Interval 09/17/2022 418    • P Axis 09/17/2022 68    • QRS Axis 09/17/2022 90    • T Wave Axis 09/17/2022 62

## 2022-12-28 ENCOUNTER — APPOINTMENT (OUTPATIENT)
Dept: LAB | Facility: HOSPITAL | Age: 72
End: 2022-12-28

## 2022-12-28 DIAGNOSIS — J20.9 MILD PERSISTENT ASTHMA WITH ACUTE BRONCHITIS AND ACUTE EXACERBATION: ICD-10-CM

## 2022-12-28 DIAGNOSIS — J45.31 MILD PERSISTENT ASTHMA WITH ACUTE BRONCHITIS AND ACUTE EXACERBATION: ICD-10-CM

## 2022-12-28 LAB
BASOPHILS # BLD AUTO: 0.04 THOUSANDS/ÂΜL (ref 0–0.1)
BASOPHILS NFR BLD AUTO: 1 % (ref 0–1)
EOSINOPHIL # BLD AUTO: 0.09 THOUSAND/ÂΜL (ref 0–0.61)
EOSINOPHIL NFR BLD AUTO: 2 % (ref 0–6)
ERYTHROCYTE [DISTWIDTH] IN BLOOD BY AUTOMATED COUNT: 13.1 % (ref 11.6–15.1)
HCT VFR BLD AUTO: 42.3 % (ref 34.8–46.1)
HGB BLD-MCNC: 13.9 G/DL (ref 11.5–15.4)
IMM GRANULOCYTES # BLD AUTO: 0.04 THOUSAND/UL (ref 0–0.2)
IMM GRANULOCYTES NFR BLD AUTO: 1 % (ref 0–2)
LYMPHOCYTES # BLD AUTO: 2.02 THOUSANDS/ÂΜL (ref 0.6–4.47)
LYMPHOCYTES NFR BLD AUTO: 34 % (ref 14–44)
MCH RBC QN AUTO: 30.6 PG (ref 26.8–34.3)
MCHC RBC AUTO-ENTMCNC: 32.9 G/DL (ref 31.4–37.4)
MCV RBC AUTO: 93 FL (ref 82–98)
MONOCYTES # BLD AUTO: 0.72 THOUSAND/ÂΜL (ref 0.17–1.22)
MONOCYTES NFR BLD AUTO: 12 % (ref 4–12)
NEUTROPHILS # BLD AUTO: 2.97 THOUSANDS/ÂΜL (ref 1.85–7.62)
NEUTS SEG NFR BLD AUTO: 50 % (ref 43–75)
NRBC BLD AUTO-RTO: 0 /100 WBCS
PLATELET # BLD AUTO: 225 THOUSANDS/UL (ref 149–390)
PMV BLD AUTO: 9.3 FL (ref 8.9–12.7)
RBC # BLD AUTO: 4.54 MILLION/UL (ref 3.81–5.12)
WBC # BLD AUTO: 5.88 THOUSAND/UL (ref 4.31–10.16)

## 2022-12-30 ENCOUNTER — TELEPHONE (OUTPATIENT)
Dept: PULMONOLOGY | Facility: CLINIC | Age: 72
End: 2022-12-30

## 2022-12-30 NOTE — TELEPHONE ENCOUNTER
Patient calling saying since starting Singulair, she has been having visual migraines  She states she had an appt with another doctor of hers and they advised her to contact our office right away and to stop the Singulair  She states she has had these migraines before but she would get 1 or 2 in a month but since starting the Singulair, she has had 4 of them in the month  She would like to know what she should do  Please advise

## 2023-01-17 ENCOUNTER — OFFICE VISIT (OUTPATIENT)
Dept: PULMONOLOGY | Facility: CLINIC | Age: 73
End: 2023-01-17

## 2023-01-17 VITALS
OXYGEN SATURATION: 99 % | HEART RATE: 88 BPM | DIASTOLIC BLOOD PRESSURE: 60 MMHG | HEIGHT: 61 IN | SYSTOLIC BLOOD PRESSURE: 142 MMHG | RESPIRATION RATE: 18 BRPM | TEMPERATURE: 98 F | BODY MASS INDEX: 21.5 KG/M2 | WEIGHT: 113.9 LBS

## 2023-01-17 DIAGNOSIS — J45.50 SEVERE PERSISTENT ASTHMA WITHOUT COMPLICATION: Primary | ICD-10-CM

## 2023-01-17 NOTE — PROGRESS NOTES
Assessment/Plan:    Severe persistent asthma without complication  Jeana Vasquez seems to have more difficulty controlling her asthma over the past 3 months  Symptoms started after her driveway was paved but she is been on 2 doses of steroids and used her rescue inhaler much more frequently  She denies any coughing or wheezing which makes me concerned of a nonpulmonary source of her dyspnea  We have ordered a stress echocardiogram to start and I reviewed his 6-minute walk today which showed no exertional hypoxia  In addition to her Breo 200 I added Spiriva 2 puffs once daily  She will let me know how she is responding to this in the next week  Diagnoses and all orders for this visit:    Severe persistent asthma without complication  -     POCT 6 minute walk  -     Echo stress test, dobutamine; Future          Subjective:      Patient ID: Luisito Reid is a 74 y o  female  Jeana Vasquez has been having more trouble breathing over the past 3 months  She notices she is increased her rescue inhaler to use it almost every day  This is a change for her baseline  It all started when her dry weight was 3 paved in September and she has had 2 courses of steroids since  primary symptoms  Associated symptoms include chest pain  Pertinent negatives include no fever, headaches, myalgias or sore throat  The following portions of the patient's history were reviewed and updated as appropriate: allergies, current medications, past family history, past medical history, past social history, past surgical history and problem list     Review of Systems   Constitutional: Negative for appetite change and fever  HENT: Positive for postnasal drip  Negative for ear pain, rhinorrhea, sneezing, sore throat and trouble swallowing  Respiratory: Positive for shortness of breath  Cardiovascular: Positive for chest pain  Musculoskeletal: Negative for myalgias  Neurological: Negative for headaches  Objective:      /60 (BP Location: Left arm, Patient Position: Sitting, Cuff Size: Standard)   Pulse 88   Temp 98 °F (36 7 °C) (Tympanic)   Resp 18   Ht 5' 1" (1 549 m)   Wt 51 7 kg (113 lb 14 4 oz)   SpO2 99%   BMI 21 52 kg/m²          Physical Exam  Constitutional:       Appearance: She is well-developed  HENT:      Head: Normocephalic  Eyes:      Pupils: Pupils are equal, round, and reactive to light  Cardiovascular:      Rate and Rhythm: Normal rate  Heart sounds: No murmur heard  Pulmonary:      Effort: Pulmonary effort is normal  No respiratory distress  Breath sounds: Normal breath sounds  No wheezing or rales  Abdominal:      Palpations: Abdomen is soft  Musculoskeletal:         General: Normal range of motion  Cervical back: Normal range of motion and neck supple  Skin:     General: Skin is warm and dry  Neurological:      Mental Status: She is alert and oriented to person, place, and time           Answers for HPI/ROS submitted by the patient on 1/15/2023  Do you have chest tightness?: Yes  Do you experience frequent throat clearing?: Yes  When did you first notice your symptoms?: 1 to 4 weeks ago  How often do your symptoms occur?: daily  Since you first noticed this problem, how has it changed?: waxing and waning  Do you have shortness of breath that occurs with effort or exertion?: Yes  Do you have ear congestion?: No  Do you have heartburn?: No  Do you have fatigue?: Yes  Do you have nasal congestion?: No  Do you have shortness of breath when lying flat?: No  Do you have shortness of breath when you wake up?: No  Do you have sweats?: No  Have you experienced weight loss?: No  Which of the following makes your symptoms worse?: change in weather, climbing stairs, exercise, exposure to fumes, exposure to smoke, strenuous activity  Which of the following makes your symptoms better?: rest

## 2023-01-17 NOTE — ASSESSMENT & PLAN NOTE
Ludmila Matson seems to have more difficulty controlling her asthma over the past 3 months  Symptoms started after her driveway was paved but she is been on 2 doses of steroids and used her rescue inhaler much more frequently  She denies any coughing or wheezing which makes me concerned of a nonpulmonary source of her dyspnea  We have ordered a stress echocardiogram to start and I reviewed his 6-minute walk today which showed no exertional hypoxia  In addition to her Breo 200 I added Spiriva 2 puffs once daily  She will let me know how she is responding to this in the next week

## 2023-01-19 ENCOUNTER — TELEPHONE (OUTPATIENT)
Dept: PULMONOLOGY | Facility: CLINIC | Age: 73
End: 2023-01-19

## 2023-01-19 NOTE — TELEPHONE ENCOUNTER
Patient calling saying Dr Dmitriy Dickey prescribed Spiriva and she thinks she is having fabian effects  She states she is lightheaded and nauseous and her head feels full  Please advise

## 2023-01-23 ENCOUNTER — OFFICE VISIT (OUTPATIENT)
Dept: URGENT CARE | Facility: CLINIC | Age: 73
End: 2023-01-23

## 2023-01-23 VITALS
SYSTOLIC BLOOD PRESSURE: 120 MMHG | HEIGHT: 61 IN | OXYGEN SATURATION: 98 % | WEIGHT: 112.5 LBS | DIASTOLIC BLOOD PRESSURE: 60 MMHG | BODY MASS INDEX: 21.24 KG/M2 | TEMPERATURE: 98.8 F | HEART RATE: 89 BPM | RESPIRATION RATE: 16 BRPM

## 2023-01-23 DIAGNOSIS — B37.0 ORAL THRUSH: Primary | ICD-10-CM

## 2023-01-23 NOTE — TELEPHONE ENCOUNTER
Patient calling saying she used her inhaler and forgot to rinse her mouth out  She states her mouth is sore and red and she is not sure if she has thrush and/or will need medication  Please advise

## 2023-01-23 NOTE — TELEPHONE ENCOUNTER
Patricia Kristina is white plaques on the roof of mouth and inside of cheeks    If she does not have that the soreness is just a sensitivity from not rinsing

## 2023-01-23 NOTE — PATIENT INSTRUCTIONS
Oral Candidiasis   WHAT YOU NEED TO KNOW:   Oral candidiasis, or thrush, is a fungal infection that affects the inside of your mouth  DISCHARGE INSTRUCTIONS:   Return to the emergency department if:   You have trouble swallowing and your jaw and neck are stiff  You are dizzy, thirsty, or have a dry mouth  You are urinating little or not at all  You cannot eat or drink because of the pain  Contact your healthcare provider if:   You have a fever  You have nausea, vomiting, or diarrhea  Your signs and symptoms get worse, even after treatment  You have questions or concerns about your condition or care  Medicines:   Antifungal medicine  helps kill the fungus that caused your oral candidiasis  This medicine may be a pill or a solution that you gargle  Remove dentures before you gargle  Take your medicine as directed  Contact your healthcare provider if you think your medicine is not helping or if you have side effects  Tell him of her if you are allergic to any medicine  Keep a list of the medicines, vitamins, and herbs you take  Include the amounts, and when and why you take them  Bring the list or the pill bottles to follow-up visits  Carry your medicine list with you in case of an emergency  Prevent oral candidiasis:  Brush your teeth, gums, and tongue after you eat and before you go to sleep  Use a toothbrush with soft bristles  See your dentist for regular exams  Remove your dentures when you sleep, or at least 6 hours each day  Clean your dentures and soak them in denture   Let them air dry after soaking  Follow up with your doctor as directed:  Write down your questions so you remember to ask them during your visits  © Copyright [employer] 2022 Information is for End User's use only and may not be sold, redistributed or otherwise used for commercial purposes   All illustrations and images included in CareNotes® are the copyrighted property of [employer]  or [employer] [facility]  The above information is an  only  It is not intended as medical advice for individual conditions or treatments  Talk to your doctor, nurse or pharmacist before following any medical regimen to see if it is safe and effective for you

## 2023-01-24 NOTE — PROGRESS NOTES
[facility] [facility]        NAME: Samuel Porter is a 74 y o  female  : 1950    MRN: [id number]  DATE: 2023  TIME: 10:02 AM    Assessment and Plan   Oral thrush [B37 0]  1  Oral thrush  nystatin (MYCOSTATIN) 500,000 units/5 mL suspension            Patient Instructions     Pt has what appears to be oral thrush which I will treat with a 2 wk course of Nystatin suspension  Follow up with PCP in 3-5 days  Proceed to  ER if symptoms worsen  Chief Complaint     Chief Complaint   Patient presents with   • Sore Throat     Pt reports throat pain this morning when waking  States some white patches on left side  Currently managing symptoms with salt water rinses  History of Present Illness       Pt presents with oral/throat pain x 1 day  Concerned it may be thrush from her inhaler  Has white patches B/L L>R  Denies fever or any other significant symptoms  Has been rinsing with salt water  Sore Throat         Review of Systems   Review of Systems   Constitutional: Negative  HENT: Positive for sore throat  Respiratory: Negative  Cardiovascular: Negative  Gastrointestinal: Negative  Genitourinary: Negative            Current Medications       Current Outpatient Medications:   •  albuterol (PROVENTIL HFA,VENTOLIN HFA) 90 mcg/act inhaler, Inhale 2 puffs every 4 (four) hours as needed for shortness of breath, Disp: 18 g, Rfl: 5  •  Cholecalciferol 50 MCG ( UT) CAPS, Take 1 tablet by mouth daily, Disp: , Rfl:   •  fluticasone (VERAMYST) 27 5 MCG/SPRAY nasal spray, 1 spray into each nostril daily, Disp: , Rfl:   •  fluticasone-vilanterol (Breo Ellipta) 200-25 MCG/INH inhaler, Inhale 1 puff daily Rinse mouth after use , Disp: 60 blister, Rfl: 3  •  levothyroxine 50 mcg tablet, Take 50 mcg by mouth daily in the early morning, Disp: , Rfl:   •  metroNIDAZOLE (METROGEL) 1 % gel, Apply 1 application topically daily, Disp: , Rfl:   •  Multiple Vitamins-Minerals (PRESERVISION AREDS 2 PO), Take by mouth, Disp: , Rfl:   •  nystatin (MYCOSTATIN) 500,000 units/5 mL suspension, Apply 5 mL (500,000 Units total) to the mouth or throat 4 (four) times a day for 14 days, Disp: 280 mL, Rfl: 0  •  Peak Flow Meter NANDA, 1 Act by Does not apply route 2 (two) times a day, Disp: , Rfl:   •  polyethylene glycol-propylene glycol (SYSTANE) 0 4-0 3 %, Apply 1 drop to eye Three times a day, Disp: , Rfl:   •  simvastatin (ZOCOR) 10 mg tablet, Take 10 mg by mouth, Disp: , Rfl:   •  sucralfate (CARAFATE) 1 g tablet, Take 1 g by mouth in the morning and 1 g in the evening , Disp: , Rfl:   •  Acetaminophen Extra Strength 500 MG tablet, , Disp: , Rfl:   •  albuterol (2 5 mg/3 mL) 0 083 % nebulizer solution, Take 1 vial (2 5 mg total) by nebulization every 6 (six) hours as needed for wheezing or shortness of breath (Patient not taking: Reported on 12/12/2022), Disp: 120 vial, Rfl: 6  •  budesonide (Pulmicort) 0 5 mg/2 mL nebulizer solution, Take 2 mL (0 5 mg total) by nebulization 2 (two) times a day Rinse mouth after use   (Patient not taking: Reported on 12/12/2022), Disp: 240 mL, Rfl: 5  •  conjugated estrogens (PREMARIN) vaginal cream, Insert 1 g into the vagina once a week, Disp: , Rfl:   •  doxycycline (PERIOSTAT) 20 MG tablet, , Disp: , Rfl:   •  Influenza Virus Vacc Split PF 0 5 ML YURIDIA, inject 0 5 milliliter intramuscularly, Disp: , Rfl:   •  raloxifene (EVISTA) 60 mg tablet, Take 60 mg by mouth daily (Patient not taking: Reported on 12/12/2022), Disp: , Rfl:   •  raloxifene (EVISTA) 60 mg tablet, Take 60 mg by mouth daily (Patient not taking: Reported on 1/23/2023), Disp: , Rfl:     Current Allergies     Allergies as of 01/23/2023 - Reviewed 01/23/2023   Allergen Reaction Noted   • Amoxicillin Abdominal Pain, GI Intolerance, and Nausea Only 12/30/2020   • Clotrimazole Vomiting 10/03/2019   • Cyclobenzaprine Nausea Only 05/24/2021   • Naproxen Nausea Only 07/15/2019   • Other  02/01/2018   • Tramadol Nausea Only 07/15/2019            The following portions of the patient's history were reviewed and updated as appropriate: allergies, current medications, past family history, past medical history, past social history, past surgical history and problem list      Past Medical History:   Diagnosis Date   • Asthma    • Cataract    • Disease of thyroid gland    • GERD (gastroesophageal reflux disease)    • Hyperlipidemia    • Melanoma ([facility] [address] ) 2022       Past Surgical History:   Procedure Laterality Date   • COLONOSCOPY     • EYE SURGERY Bilateral     preventative glaucoma surgery   • ROTATOR CUFF REPAIR Right     6/2022   • THYROID SURGERY         Family History   Problem Relation Age of Onset   • Hypertension Father    • Cancer Maternal Aunt         lip cancer         Medications have been verified  Objective   /60   Pulse 89   Temp 98 8 °F (37 1 °C)   Resp 16   Ht 5' 1" (1 549 m)   Wt 51 kg (112 lb 8 oz)   SpO2 98%   BMI 21 26 kg/m²   No LMP recorded  Patient is postmenopausal        Physical Exam     Physical Exam  Vitals reviewed  Constitutional:       General: She is not in acute distress  Appearance: She is well-developed  HENT:      Mouth/Throat:      Comments: White patches present on buccal mucosa and soft palate resembling oral candidiasis/thrush  No tonsillar or posterior pharyngeal exudate  Tongue appears normal    Musculoskeletal:      Cervical back: Neck supple  Lymphadenopathy:      Cervical: No cervical adenopathy  Neurological:      Mental Status: She is alert and oriented to person, place, and time

## 2023-02-06 ENCOUNTER — EVALUATION (OUTPATIENT)
Dept: PHYSICAL THERAPY | Facility: CLINIC | Age: 73
End: 2023-02-06

## 2023-02-06 DIAGNOSIS — M54.2 NECK PAIN ON RIGHT SIDE: Primary | ICD-10-CM

## 2023-02-06 DIAGNOSIS — M25.511 ACUTE PAIN OF RIGHT SHOULDER: ICD-10-CM

## 2023-02-06 NOTE — PROGRESS NOTES
PT Evaluation     Today's date: 2023  Patient name: Cheryl Edwards  : 1950  MRN: [id number]  Referring provider: Heidi Albarran DO  Dx:   Encounter Diagnosis     ICD-10-CM    1  Neck pain on right side  M54 2       2  Acute pain of right shoulder  M25 511                       Assessment  Assessment details: Patient is a 74 y o  female with PT prescription for right shoulder and right sided neck pain  Patient presents with decreased cervical spine AROM with pain provocation during extension and right rotation, decreased joint mobility of cervical spine, soft tissue restrictions along lateral neck musculature, some comorbid shoulder ROM restrictions, mild postural deficits, and decreased postural stabilization  These impairments cause constant pain and limit patient with sleeping, turning head while driving or looking up, and completing household chores without pain  To address impairments and improve function, patient would benefit from skilled PT consisting of manual therapy to improve cervical spine ROM and joint mobility, to reduce soft tissue restrictions along cervical spine, therapeutic exercises to improve shoulder flexibility, neuromuscular reeducation to improve postural stabilization, and patient education on proper posture and home program     Impairments: abnormal muscle firing, abnormal or restricted ROM, impaired physical strength, lacks appropriate home exercise program, pain with function, poor posture  and poor body mechanics    Symptom irritability: moderateUnderstanding of Dx/Px/POC: good   Prognosis: good    Goals  Short term goals:  Patient is independent in home program to support plan of care and improve function  - 2 weeks  Patient demonstrates at least 70 deg  with cervical rotation AROM so she can drive with less pain  -2 weeks  Patient demonstrates proper cervical spine posture without cues so she can perform ADLs with less pain   - 2 weeks    Long term goals:  Patient demonstrates improvement in community participation with increase in FOTO score by 15%  - 8 weeks  Patient can get out of bed without pain for improved quality of life  - 6 weeks  Patient demonstrates 90% cervical AROM without pain so she can perform all ADLs/IADLs without difficulty  8 weeks      Plan  Patient would benefit from: skilled physical therapy  Planned therapy interventions: activity modification, joint mobilization, manual therapy, massage, neuromuscular re-education, patient education, postural training, strengthening, stretching, body mechanics training, therapeutic exercise, flexibility, functional ROM exercises, home exercise program and therapeutic activities  Frequency: 2x week  Duration in weeks: 8  Plan of Care beginning date: 2023  Plan of Care expiration date: 4/3/2023  Treatment plan discussed with: patient        Subjective Evaluation    History of Present Illness  Date of onset: 2023  Mechanism of injury: Patient has prescription for right sided neck and shoulder pain  She reports she slept on R shoulder about 1 month ago and woke up with pain in upper trap area  She messaged PA-C from shoulder surgeon and has follow up next week  She was at neurologist for another complaint, and mentioned symptoms, and received PT script  She reports slight improvement in pain after doing chin tucks  Symptoms: right upper trapezius pain  Patient is limited with waking in morning without pain, turning head to look up or to the side without pain, right UE use for ADLs and chores without pain  Patient denies UE parathesias  Employment: retired    PMH: asthma, h/o R RTC repair    Pain  Current pain ratin  At best pain ratin  At worst pain ratin    Patient Goals  Patient goals for therapy: decreased pain and increased motion          Objective     Tenderness     Additional Tenderness Details  Mild tenderness R suboccipitals    Active Range of Motion   Cervical/Thoracic Spine Cervical    Flexion: 55 degrees  WFL  Extension: 40 degrees     with pain Restriction level: moderate  Left lateral flexion: 20 degrees     Restriction level: moderate  Right lateral flexion: 18 degrees     Restriction level moderate  Left rotation: 50 degrees Restriction level: moderate  Right rotation: 62 degrees    with pain Restriction level: moderate  Left Shoulder   External rotation BTH: T4   Internal rotation BTB: T4     Right Shoulder   Flexion: 140 (inferior shoulder pain) degrees with pain  Abduction: 130 (inferior shoulder pain) degrees with pain  External rotation BTH: T2 (inferior shoulder pain) with pain  Internal rotation BTB: T10     Passive Range of Motion     Right Shoulder   Flexion: 135 degrees   Abduction: 115 degrees   External rotation 45°: 85 degrees   Internal rotation 45°: 75 degrees     Joint Play     Comments: Moderate joint restriction/hypomobility with lower cervical spine with upglide rotation   Moderate joint restriction/hypomobility lower cervical spine on R, throughout cervical spine on L with side glide mobilization    Strength/Myotome Testing     Right Shoulder     Planes of Motion   Flexion: 4+   Abduction: 4+   External rotation at 45°: 5   Internal rotation at 45°: 5   Horizontal abduction: 4     Tests   Cervical   Positive neck flexor muscle endurance test   Negative alar ligament test and transverse ligament test      Additional Tests Details  30 seconds with deep neck flexor endurance test, but decreased stability and pain after 10 seconds             Precautions: asthma, h/o R RTC repair, EPOC 4/3/23    Manuals 2/6            Distraction/sub occipital release             Upglides and side glides cervical spine gr 3-4             [facility] UT                          Neuro Re-Ed                          Chin tuck             Scap retraction             Chin tuck with rotation                          Ther Ex             UT stretch             Doorway pec stretch Pt edu on plan of care, pathology, home program 8'            Cervical rotation AROM                                                                                                                                  Ther Activity                                       Gait Training                                       Modalities             Moist hot pack

## 2023-02-07 ENCOUNTER — OFFICE VISIT (OUTPATIENT)
Dept: PHYSICAL THERAPY | Facility: CLINIC | Age: 73
End: 2023-02-07

## 2023-02-07 DIAGNOSIS — M25.511 ACUTE PAIN OF RIGHT SHOULDER: ICD-10-CM

## 2023-02-07 DIAGNOSIS — M54.2 NECK PAIN ON RIGHT SIDE: Primary | ICD-10-CM

## 2023-02-07 NOTE — PROGRESS NOTES
Daily Note     Today's date: 2023  Patient name: Taibtha Barakat  : 1950  MRN: [id number]  Referring provider: Kd Gan DO  Dx:   Encounter Diagnosis     ICD-10-CM    1  Neck pain on right side  M54 2       2  Acute pain of right shoulder  M25 511                      Subjective: patient reports a little soreness following evaluation  She also reports that she gets R UT pain when typing on computer  Objective: See treatment diary below      Assessment: Began session with moist hot pack to reduce pain and irritability of cervical spine  Performed manual therapy to improve soft tissue restrictions along UT and joint mobility of cervical spine  Patient tolerated well with improved mobility of cervical spine following  Initiated therapeutic exercises to improve UE flexbility, and neuromuscular reeducation to improve postural muscle stabilization  Patient would benefit from continued skilled PT per plan of care to address impairments and improve function  Plan: Continue per plan of care        Precautions: asthma, h/o R RTC repair, EPOC 4/3/23    Manuals             Distraction/sub occipital release EVELIO            Upglides and side glides cervical spine gr 3-4 EVELIO            STM UT EVELIO                         Neuro Re-Ed                          Supine Chin tuck 10x5"            Scap retraction 10x5"            Chin tuck with rotation 5x5" ea                         Ther Ex             UT stretch 3x15" ea            Doorway pec major stretch 3x15"            Doorway pec minor stretch 3x15"            Pt edu on home program, pillow positioning 8'            Supine wand flex AAROM 4x10"            Cross body adduction 4x10"                                                                                                                    Ther Activity                                       Gait Training                                       Modalities             Moist hot pack start 8'

## 2023-02-10 DIAGNOSIS — J45.30 MILD PERSISTENT ASTHMA WITHOUT COMPLICATION: ICD-10-CM

## 2023-02-14 ENCOUNTER — OFFICE VISIT (OUTPATIENT)
Dept: PHYSICAL THERAPY | Facility: CLINIC | Age: 73
End: 2023-02-14

## 2023-02-14 DIAGNOSIS — M25.511 ACUTE PAIN OF RIGHT SHOULDER: ICD-10-CM

## 2023-02-14 DIAGNOSIS — M54.2 NECK PAIN ON RIGHT SIDE: Primary | ICD-10-CM

## 2023-02-14 NOTE — PROGRESS NOTES
Daily Note     Today's date: 2023  Patient name: Karolyn Harding  : 1950  MRN: [id number]  Referring provider: Tiffanie Hall DO  Dx:   Encounter Diagnosis     ICD-10-CM    1  Neck pain on right side  M54 2       2  Acute pain of right shoulder  M25 511                      Subjective: patient reports that she tried using towel roll to support neck but had increased head ache and neck pain the next morning  She also has long history of tinnitus but this increases when she has more neck pain  Objective: See treatment diary below      Assessment: Instructed patient in use of cervical roll to support neck when sleeping  Patient demonstrated improvement in cervical spine joint mobility with upglides and sideglides today  Patient demonstrated good muscle recruitment with chin tucks  Patient would benefit from continued skilled PT per plan of care to address impairments and improve function  Plan: Continue per plan of care        Precautions: asthma, h/o R RTC repair, EPOC 4/3/23    Manuals            Distraction/sub occipital release EVELIO EVELIO           Upglides and side glides cervical spine gr 3-4 EVELIO EVELIO           STM UT EVELIO EVELIO                        Neuro Re-Ed                          Supine Chin tuck 10x5" 10x5"           Scap retraction 10x5"            Chin tuck with rotation 5x5" ea 5x5" ea                        Ther Ex             UT stretch 3x15" ea 3x15" ea           Doorway pec major stretch 3x15"            Doorway pec minor stretch 3x15"            Pt edu on home program, pillow positioning 8' 3'           Supine wand flex AAROM 4x10" 5x10"           Cross body adduction 4x10"            Cervical AROM rotation supine  5x5"                                                                                                      Ther Activity                                       Gait Training                                       Modalities             Moist hot pack start 8' 8'

## 2023-02-21 ENCOUNTER — OFFICE VISIT (OUTPATIENT)
Dept: PHYSICAL THERAPY | Facility: CLINIC | Age: 73
End: 2023-02-21

## 2023-02-21 DIAGNOSIS — M25.511 ACUTE PAIN OF RIGHT SHOULDER: ICD-10-CM

## 2023-02-21 DIAGNOSIS — M54.2 NECK PAIN ON RIGHT SIDE: Primary | ICD-10-CM

## 2023-02-21 NOTE — PROGRESS NOTES
Daily Note     Today's date: 2023  Patient name: Deuce Gaitan  : 1950  MRN: [id number]  Referring provider: Megan Garcia DO  Dx:   Encounter Diagnosis     ICD-10-CM    1  Neck pain on right side  M54 2       2  Acute pain of right shoulder  M25 511                      Subjective: patient reports that she saw her orthopedic surgeon  She was recommended to continue stretching her shoulder but to hold off on resistance band exercises at this time  She also reports that she got Olimpia cervical roll and this has been helping her night pain a little  Objective: See treatment diary below    145 deg  Flexion AROM on R    Assessment: Began session with moist hot pack again to improve soft tissue restrictions and reduce pain  Patient demonstrated significant improvement in R shoulder AROM compared to start of care  She displayed good muscle recruitment with chin tucks and tuck with rotation  No significant provocation of UT pain with exercises today  Patient would benefit from continued skilled PT per plan of care to address impairments and improve function  Plan: Continue per plan of care  Precautions: asthma, h/o R RTC repair, (per shoulder surgeon, no shoulder strengthening for now)   EPOC 4/3/23    Manuals           Distraction/sub occipital release EVELIO EVELIO EVELIO          Upglides and side glides cervical spine gr 3-4 EVELIO EVELIO EVELIO          STM UT EVELIO EVELIO EVELIO                       Neuro Re-Ed                          Supine Chin tuck 10x5" 10x5" 10x5"          Scap retraction 10x5"            Chin tuck with rotation 5x5" ea 5x5" ea 5x5" ea                       Ther Ex             UT stretch 3x15" ea 3x15" ea 3x15" ea          Doorway pec major stretch 3x15"            Doorway pec minor stretch 3x15"            Pt edu on home program, pillow positioning 8' 3'           Supine wand flex AAROM 4x10" 5x10" 10x10"          Cross body adduction 4x10"            Cervical AROM rotation supine  5x5" 5x5"                                                                                                     Ther Activity                                       Gait Training                                       Modalities             Moist hot pack start 8' 8'

## 2023-02-22 ENCOUNTER — HOSPITAL ENCOUNTER (OUTPATIENT)
Dept: NON INVASIVE DIAGNOSTICS | Facility: HOSPITAL | Age: 73
Discharge: HOME | End: 2023-02-22
Attending: INTERNAL MEDICINE

## 2023-02-22 DIAGNOSIS — J45.50 SEVERE PERSISTENT ASTHMA WITHOUT COMPLICATION: ICD-10-CM

## 2023-02-22 DIAGNOSIS — R94.39 ABNORMAL CARDIOVASCULAR STRESS TEST: Primary | ICD-10-CM

## 2023-02-22 LAB
AV REGURGITATION PRESSURE HALF TIME: 364 MS
CHEST PAIN STATEMENT: NORMAL
E WAVE DECELERATION TIME: 250 MS
MAX DIASTOLIC BP: 78 MMHG
MAX HEART RATE: 169 BPM
MAX HR PERCENT: 115 %
MAX HR: 169 BPM
MAX PREDICTED HEART RATE: 146 BPM
MAX. SYSTOLIC BP: 160 MMHG
MV E'TISSUE VEL-LAT: 11 CM/S
MV E'TISSUE VEL-SEP: 9 CM/S
MV PEAK A VEL: 1.06 M/S
MV PEAK E VEL: 103 CM/S
MV STENOSIS PRESSURE HALF TIME: 73 MS
MV VALVE AREA P 1/2 METHOD: 3.01 CM2
PROTOCOL NAME: NORMAL
RATE PRESSURE PRODUCT: NORMAL
REASON FOR TERMINATION: NORMAL
SL CV AV DECELERATION TIME RETROGRADE: 1255 MS
SL CV AV PEAK GRADIENT RETROGRADE: 45 MMHG
SL CV LV EF: 55
SL CV STRESS RECOVERY BP: NORMAL MMHG
SL CV STRESS RECOVERY HR: 86 BPM
SL CV STRESS RECOVERY O2 SAT: 98 %
SL CV STRESS STAGE REACHED: 2
STRESS ANGINA INDEX: 0
STRESS BASELINE BP: 140 MMHG
STRESS BASELINE HR: 79 BPM
STRESS DUKE TREADMILL SCORE: 1
STRESS O2 SAT REST: 100 %
STRESS PEAK HR: 122 BPM
STRESS POST ESTIMATED WORKLOAD: 7 METS
STRESS POST EXERCISE DUR MIN: 6 MIN
STRESS POST EXERCISE DUR SEC: 1 SEC
STRESS POST O2 SAT PEAK: 98 %
STRESS POST PEAK BP: 147 MMHG
STRESS ST ELEVATION: 1 MM
TARGET HR FORMULA: NORMAL
TEST INDICATION: NORMAL
TIME IN EXERCISE PHASE: NORMAL
TR MAX PG: 32 MMHG
TR PEAK VELOCITY: 2.8 M/S
TRICUSPID ANNULAR PLANE SYSTOLIC EXCURSION: 1.9 CM
TRICUSPID VALVE PEAK REGURGITATION VELOCITY: 2.81 M/S

## 2023-02-22 NOTE — RESULT ENCOUNTER NOTE
I spoke with Ellen Chandrakant regarding the positive results of her stress test  All of her questions were answered  Ambulatory referral was placed to [facility] and I personally called the office to convey the need for the patient's urgent visit; however, call went to answering service  I will call again tomorrow morning to ensure receipt of referral  In the meantime, I advised the patient to stop PT until seen by Cardiology and if she develops any chest pain, she needs to call 911 to go to the ER  She verbalized understanding  She is aware she can call the office at any time with any questions or concerns      Gissell Anton

## 2023-02-23 ENCOUNTER — APPOINTMENT (OUTPATIENT)
Dept: PHYSICAL THERAPY | Facility: CLINIC | Age: 73
End: 2023-02-23

## 2023-02-23 NOTE — RESULT ENCOUNTER NOTE
I spoke with Corinna Bhagat and confirmed receipt of consult and made them aware of urgent nature due to positive stress test

## 2023-02-27 ENCOUNTER — HOSPITAL ENCOUNTER (EMERGENCY)
Facility: HOSPITAL | Age: 73
Discharge: HOME | End: 2023-02-27
Attending: EMERGENCY MEDICINE

## 2023-02-27 ENCOUNTER — TELEPHONE (OUTPATIENT)
Dept: CARDIOLOGY CLINIC | Facility: CLINIC | Age: 73
End: 2023-02-27

## 2023-02-27 ENCOUNTER — APPOINTMENT (EMERGENCY)
Dept: RADIOLOGY | Facility: HOSPITAL | Age: 73
End: 2023-02-27

## 2023-02-27 ENCOUNTER — CONSULT (OUTPATIENT)
Dept: CARDIOLOGY CLINIC | Facility: CLINIC | Age: 73
End: 2023-02-27

## 2023-02-27 VITALS
OXYGEN SATURATION: 99 % | HEIGHT: 61 IN | DIASTOLIC BLOOD PRESSURE: 65 MMHG | BODY MASS INDEX: 21.14 KG/M2 | SYSTOLIC BLOOD PRESSURE: 141 MMHG | WEIGHT: 112 LBS | TEMPERATURE: 98.5 F | HEART RATE: 89 BPM | RESPIRATION RATE: 18 BRPM

## 2023-02-27 VITALS
DIASTOLIC BLOOD PRESSURE: 60 MMHG | WEIGHT: 113.4 LBS | SYSTOLIC BLOOD PRESSURE: 126 MMHG | HEART RATE: 84 BPM | BODY MASS INDEX: 21.41 KG/M2 | HEIGHT: 61 IN

## 2023-02-27 DIAGNOSIS — I25.9 CHEST PAIN DUE TO MYOCARDIAL ISCHEMIA, UNSPECIFIED ISCHEMIC CHEST PAIN TYPE: Primary | ICD-10-CM

## 2023-02-27 DIAGNOSIS — R07.9 CHEST PAIN: Primary | ICD-10-CM

## 2023-02-27 DIAGNOSIS — R94.39 ABNORMAL CARDIOVASCULAR STRESS TEST: ICD-10-CM

## 2023-02-27 LAB
2HR DELTA HS TROPONIN: 2 NG/L
ALBUMIN SERPL BCP-MCNC: 4.6 G/DL (ref 3.5–5)
ALP SERPL-CCNC: 75 U/L (ref 34–104)
ALT SERPL W P-5'-P-CCNC: 16 U/L (ref 7–52)
ANION GAP SERPL CALCULATED.3IONS-SCNC: 9 MMOL/L (ref 4–13)
AST SERPL W P-5'-P-CCNC: 17 U/L (ref 13–39)
BASOPHILS # BLD AUTO: 0.04 THOUSANDS/ÂΜL (ref 0–0.1)
BASOPHILS NFR BLD AUTO: 1 % (ref 0–1)
BILIRUB SERPL-MCNC: 0.51 MG/DL (ref 0.2–1)
BUN SERPL-MCNC: 26 MG/DL (ref 5–25)
CALCIUM SERPL-MCNC: 10.1 MG/DL (ref 8.4–10.2)
CARDIAC TROPONIN I PNL SERPL HS: 5 NG/L
CARDIAC TROPONIN I PNL SERPL HS: 7 NG/L
CHLORIDE SERPL-SCNC: 104 MMOL/L (ref 96–108)
CO2 SERPL-SCNC: 26 MMOL/L (ref 21–32)
CREAT SERPL-MCNC: 0.65 MG/DL (ref 0.6–1.3)
EOSINOPHIL # BLD AUTO: 0.1 THOUSAND/ÂΜL (ref 0–0.61)
EOSINOPHIL NFR BLD AUTO: 1 % (ref 0–6)
ERYTHROCYTE [DISTWIDTH] IN BLOOD BY AUTOMATED COUNT: 12.5 % (ref 11.6–15.1)
GFR SERPL CREATININE-BSD FRML MDRD: 87 ML/MIN/1.73SQ M
GLUCOSE SERPL-MCNC: 94 MG/DL (ref 65–140)
HCT VFR BLD AUTO: 44 % (ref 34.8–46.1)
HGB BLD-MCNC: 15 G/DL (ref 11.5–15.4)
IMM GRANULOCYTES # BLD AUTO: 0.06 THOUSAND/UL (ref 0–0.2)
IMM GRANULOCYTES NFR BLD AUTO: 1 % (ref 0–2)
LIPASE SERPL-CCNC: 70 U/L (ref 11–82)
LYMPHOCYTES # BLD AUTO: 2.56 THOUSANDS/ÂΜL (ref 0.6–4.47)
LYMPHOCYTES NFR BLD AUTO: 32 % (ref 14–44)
MCH RBC QN AUTO: 31.1 PG (ref 26.8–34.3)
MCHC RBC AUTO-ENTMCNC: 34.1 G/DL (ref 31.4–37.4)
MCV RBC AUTO: 91 FL (ref 82–98)
MONOCYTES # BLD AUTO: 0.78 THOUSAND/ÂΜL (ref 0.17–1.22)
MONOCYTES NFR BLD AUTO: 10 % (ref 4–12)
NEUTROPHILS # BLD AUTO: 4.51 THOUSANDS/ÂΜL (ref 1.85–7.62)
NEUTS SEG NFR BLD AUTO: 55 % (ref 43–75)
NRBC BLD AUTO-RTO: 0 /100 WBCS
PLATELET # BLD AUTO: 204 THOUSANDS/UL (ref 149–390)
PMV BLD AUTO: 9.3 FL (ref 8.9–12.7)
POTASSIUM SERPL-SCNC: 4 MMOL/L (ref 3.5–5.3)
PROT SERPL-MCNC: 7 G/DL (ref 6.4–8.4)
RBC # BLD AUTO: 4.82 MILLION/UL (ref 3.81–5.12)
SODIUM SERPL-SCNC: 139 MMOL/L (ref 135–147)
WBC # BLD AUTO: 8.05 THOUSAND/UL (ref 4.31–10.16)

## 2023-02-27 RX ORDER — ASPIRIN 81 MG/1
81 TABLET ORAL DAILY
Qty: 30 TABLET | Refills: 3 | Status: SHIPPED | OUTPATIENT
Start: 2023-02-27

## 2023-02-27 RX ORDER — NITROGLYCERIN 0.4 MG/1
0.4 TABLET SUBLINGUAL
Qty: 30 TABLET | Refills: 0 | Status: SHIPPED | OUTPATIENT
Start: 2023-02-27

## 2023-02-27 RX ORDER — ROSUVASTATIN CALCIUM 20 MG/1
20 TABLET, COATED ORAL DAILY
Qty: 30 TABLET | Refills: 3 | Status: SHIPPED | OUTPATIENT
Start: 2023-02-27

## 2023-02-27 RX ADMIN — SODIUM CHLORIDE 1000 ML: 0.9 INJECTION, SOLUTION INTRAVENOUS at 01:47

## 2023-02-27 NOTE — PROGRESS NOTES
[address] [facility]iology  Office Consultation  Luke Mcnulty 74 y o  female MRN: [id number]        Chief Complaint    Chief Complaint   Patient presents with   • Consult     Referred by urgent care    • Chest Pain     Sharp to squeezing randomly    • Shortness of Breath     Has been occurring since stress test occurs at rest   • Rapid Heart Rate     Fast and hard- occurs randomly       Referring Provider: ERICH Maya    Impression & Plan:    1  Abnormal cardiovascular stress test  - Ambulatory Referral to Cardiology  - Case Request Cath Lab: Cardiac Coronary Angiogram; Standing  - Case Request Cath Lab: Cardiac Coronary Angiogram  - aspirin (ECOTRIN LOW STRENGTH) 81 mg EC tablet; Take 1 tablet (81 mg total) by mouth daily  Dispense: 30 tablet; Refill: 3    2  Chest pain due to myocardial ischemia, unspecified ischemic chest pain type  - metoprolol tartrate (LOPRESSOR) 25 mg tablet; Take 1 tablet (25 mg total) by mouth every 12 (twelve) hours  Dispense: 60 tablet; Refill: 3  - rosuvastatin (CRESTOR) 20 MG tablet; Take 1 tablet (20 mg total) by mouth daily  Dispense: 30 tablet; Refill: 3  - nitroglycerin (NITROSTAT) 0 4 mg SL tablet; Place 1 tablet (0 4 mg total) under the tongue every 5 (five) minutes as needed for chest pain  Dispense: 30 tablet; Refill: 0  - Case Request Cath Lab: Cardiac Coronary Angiogram; Standing  - Case Request Cath Lab: Cardiac Coronary Angiogram  - aspirin (ECOTRIN LOW STRENGTH) 81 mg EC tablet; Take 1 tablet (81 mg total) by mouth daily  Dispense: 30 tablet; Refill: 3      Ms Azul has typical anginal symptoms with a positive exercise stress echocardiogram   Her result is at least moderate if not high risk for multivessel disease due to apparent failure of the left ventricle to augment properly with exercise  The only inducible regional wall motion abnormality was the inferior wall      We will start medical antianginal therapy with metoprolol tartrate 25 mg twice daily, and sublingual nitroglycerin as needed  Start aspirin 81 mg daily  Start rosuvastatin 20 mg daily  With a low risk stress result, it would be reasonable to trial medical therapy prior to proceeding with cardiac catheterization  However, due to her moderate to high risk stress result we will proceed with cardiac catheterization at this time  We will see Vicky Myers back in 2 months for routine follow-up  HPI: Vicky Myers is a 74y o  year old female with asthma presenting for further evaluation of chest pain  She has been having exertional shortness of breath, typical of her asthma, but lately accompanied by a tightness in her chest radiating down into her abdomen  She has had these symptoms for a few years  However, lately she has noticed that the symptoms occur with lower level of exertion during her walks and she shared this with her pulmonologist who ordered a stress test for further evaluation       Last night, she woke up to use the bathroom and she felt soreness through the left side of her chest  She put on her pulse oximeter and her heart was racing in the 120s  She presented to [address] [place] [address] emergency department where she was ruled out for ACS by EKG and serial troponins  She was discharged to outpatient follow-up      Her ophthalmologist has previously noted that she had sclerotic changes of a retinal artery and advised that she follow-up with PCP to rule out possible cardiovascular or metabolic contributions       I personally reviewed and independently reinterpreted the echo stress from 2/22/2023; patient exercised for 6 minutes 1 second, 169 bpm, 115% MPHR, 7 0 METS limited by chest pain  There are horizontal, 1 mm ST depressions in the inferior leads II, III, aVF, precordial ST segment changes do not meet diagnostic criteria for ischemia  EKG is overall consistent with ischemia    The baseline rest echocardiogram shows normal ejection fraction 55 to 60% with no regional wall motion abnormalities; at peak stress there is akinesis of the basal to mid inferior wall as well as a failure of the LV to properly augment  Cardiac testing:       EKG reviewed personally:   n/a      Relevant Laboratory Studies:  (Laboratory studies personally reviewed)  Lipid panel 9/28/22 -- reviewed,  mg/dL, HDL 76 mg/dL, TG 81 mg/dL, LDL 70 mg/dL  CMP 2/27/2023 reviewed--normal renal function, within normal limits  High-sensitivity troponin 2/27/2023 reviewed--5 at 0-hour, 7 at 2-hour  CBC 2/27/2023 reviewed--WNL        Review of Systems   Constitutional: Negative for activity change and fatigue  HENT: Negative for facial swelling  Eyes: Negative for visual disturbance  Respiratory: Negative for chest tightness and shortness of breath  Cardiovascular: Negative for chest pain, palpitations and leg swelling  Gastrointestinal: Negative for nausea and vomiting  Musculoskeletal: Negative for myalgias  Skin: Negative for pallor  Allergic/Immunologic: Negative for immunocompromised state  Neurological: Negative for dizziness, syncope and light-headedness  Psychiatric/Behavioral: Negative for agitation and confusion  The patient is nervous/anxious            Past Medical History:   Diagnosis Date   • Asthma    • Cataract    • Disease of thyroid gland    • GERD (gastroesophageal reflux disease)    • Hyperlipidemia    • Kidney stone    • Melanoma ([facility] [address] ) 2022     Past Surgical History:   Procedure Laterality Date   • COLONOSCOPY     • EYE SURGERY Bilateral     preventative glaucoma surgery   • ROTATOR CUFF REPAIR Right     6/2022   • THYROID SURGERY       Social History     Substance and Sexual Activity   Alcohol Use Not Currently     Social History     Substance and Sexual Activity   Drug Use Never     Social History     Tobacco Use   Smoking Status Never   • Passive exposure: Past   Smokeless Tobacco Never     Family History   Problem Relation Age of Onset   • Hypertension Father    • Cancer Maternal Aunt         lip cancer       Allergies: Allergies   Allergen Reactions   • Amoxicillin Abdominal Pain, GI Intolerance and Nausea Only   • Clotrimazole Vomiting   • Cyclobenzaprine Nausea Only   • Naproxen Nausea Only   • Nystatin Nausea Only   • Other      Very sensitive to strong perfumes, sprays, smoke and exercise etc   Recently dx with asthma   • Tramadol Nausea Only       Medications (as of START of this encounter): Outpatient Medications Prior to Visit   Medication Sig Dispense Refill   • albuterol (PROVENTIL HFA,VENTOLIN HFA) 90 mcg/act inhaler Inhale 2 puffs every 4 (four) hours as needed for shortness of breath 18 g 5   • Breo Ellipta 200-25 MCG/ACT inhaler INHALE 1 PUFF DAILY RINSE MOUTH AFTER USE  60 each 3   • Cholecalciferol 50 MCG (2000 UT) CAPS Take 1 tablet by mouth daily     • fluticasone (VERAMYST) 27 5 MCG/SPRAY nasal spray 1 spray into each nostril daily     • Influenza Virus Vacc Split PF 0 5 ML YURIDIA inject 0 5 milliliter intramuscularly     • levothyroxine 50 mcg tablet Take 50 mcg by mouth daily in the early morning     • metroNIDAZOLE (METROGEL) 1 % gel Apply 1 application topically daily     • Multiple Vitamins-Minerals (PRESERVISION AREDS 2 PO) Take by mouth     • Peak Flow Meter NANDA 1 Act by Does not apply route 2 (two) times a day     • polyethylene glycol-propylene glycol (SYSTANE) 0 4-0 3 % Apply 1 drop to eye Three times a day     • raloxifene (EVISTA) 60 mg tablet Take 60 mg by mouth daily     • sucralfate (CARAFATE) 1 g tablet Take 1 g by mouth in the morning and 1 g in the evening       • simvastatin (ZOCOR) 10 mg tablet Take 10 mg by mouth     • Acetaminophen Extra Strength 500 MG tablet  (Patient not taking: Reported on 12/12/2022)     • albuterol (2 5 mg/3 mL) 0 083 % nebulizer solution Take 1 vial (2 5 mg total) by nebulization every 6 (six) hours as needed for wheezing or shortness of breath (Patient not taking: Reported on 12/12/2022) 120 vial 6 • budesonide (Pulmicort) 0 5 mg/2 mL nebulizer solution Take 2 mL (0 5 mg total) by nebulization 2 (two) times a day Rinse mouth after use  (Patient not taking: Reported on 12/12/2022) 240 mL 5   • conjugated estrogens (PREMARIN) vaginal cream Insert 1 g into the vagina once a week     • doxycycline (PERIOSTAT) 20 MG tablet  (Patient not taking: Reported on 12/12/2022)     • raloxifene (EVISTA) 60 mg tablet Take 60 mg by mouth daily (Patient not taking: Reported on 1/23/2023)       No facility-administered medications prior to visit  Vitals:    02/27/23 0821   BP: 126/60   Pulse: 84     Weight (last 2 days)     Date/Time Weight    02/27/23 0821 51 4 (113 4)          General: Marianne Hernadez is a well appearing female, in no acute distress, sitting comfortably  HEENT: moist mucous membranes, EOMI  Neck:  No JVD, supple, trachea midline   Cardiovascular: unremarkable S1/S2, regular rate and rhythm, no murmurs, rubs or gallops   Pulmonary: normal respiratory effort, CTAB   Abdomen: soft and nondistended  Extremities: No lower extremity edema  Warm and well perfused extremities  Neuro: no focal motor deficits, AAOx3 (person, place, time)  Psych: Normal mood and affect, cooperative        Time Spent:  Total time (face-to-face and non-face-to-face) spent on today's visit was 40 minutes  This includes preparation for the visits (i e  reviewing test results), performance of a medically appropriate history and examination, and orders for medications, tests or other procedures  This time is exclusive of procedures performed and time spent teaching  Francisco Hilario MD    This note was completed in part utilizing [facility] recognition software  Grammatical errors, random word insertion, spelling mistakes, occasional wrong word or "sound-alike" substitutions and incomplete sentences may be an occasional consequence of the system secondary to software limitations, ambient noise and hardware issues   At the time of dictation, efforts were made to edit, clarify and /or correct errors  Please read the chart carefully and recognize, using context, where substitutions have occurred  If you have any questions or concerns about the context, text or information contained within the body of this dictation, please contact myself, the provider, for further clarification

## 2023-02-27 NOTE — ED PROVIDER NOTES
History  Chief Complaint   Patient presents with   • Chest Pain     EMS from home; pt with pain and SOB for past few days, had stress test Wednesday; tonight was lying in bed when pain started, 4-81mg aspirin given by EMS     68-year-old female past medical history of asthma presents for evaluation of left-sided sharp chest pain which happened approximately 40 minutes prior to arrival, lasted approximately 40 minutes and now somewhat subsided, she also checked her pulse at that time she was having pain and it was elevated to 120s  Denies any shortness of breath, last took her albuterol approximately 4 hours ago  Chart review appears patient had a stress echo done on  and was told that it was abnormal, she was able to get an emergency appointment with cardiology tomorrow morning however developed the chest pain this evening and came in for evaluation  No history of heart attacks in the past, no history of DVT or PE  Did receive 324 mg of chewable aspirin on EMS ride in  She will EKG from EMS providers reviewed with no ischemic changes    On chart review noted stress induced stress echo with ST depressions in inferolateral lead with 1 mm of ST elevation in aVR this resolved with rest   Otherwise EF 50 to 60% with no regional wall motion abnormalities  Prior to Admission Medications   Prescriptions Last Dose Informant Patient Reported? Taking? Acetaminophen Extra Strength 500 MG tablet   Yes No   Patient not taking: Reported on 2022   Breo Ellipta 200-25 MCG/ACT inhaler   No No   Sig: INHALE 1 PUFF DAILY RINSE MOUTH AFTER USE     Cholecalciferol 50 MCG ( UT) CAPS   Yes No   Sig: Take 1 tablet by mouth daily   Influenza Virus Vacc Split PF 0 5 ML YURIDIA   Yes No   Sig: inject 0 5 milliliter intramuscularly   Multiple Vitamins-Minerals (PRESERVISION AREDS 2 PO)   Yes No   Sig: Take by mouth   Peak Flow Meter NANDA   Yes No   Si Act by Does not apply route 2 (two) times a day   albuterol (2 5 mg/3 mL) 0 083 % nebulizer solution   No No   Sig: Take 1 vial (2 5 mg total) by nebulization every 6 (six) hours as needed for wheezing or shortness of breath   Patient not taking: Reported on 2022   albuterol (PROVENTIL HFA,VENTOLIN HFA) 90 mcg/act inhaler   No No   Sig: Inhale 2 puffs every 4 (four) hours as needed for shortness of breath   budesonide (Pulmicort) 0 5 mg/2 mL nebulizer solution   No No   Sig: Take 2 mL (0 5 mg total) by nebulization 2 (two) times a day Rinse mouth after use  Patient not taking: Reported on 2022   conjugated estrogens (PREMARIN) vaginal cream   Yes No   Sig: Insert 1 g into the vagina once a week   doxycycline (PERIOSTAT) 20 MG tablet   Yes No   Patient not taking: Reported on 2022   fluticasone (VERAMYST) 27 5 MCG/SPRAY nasal spray   No No   Si spray into each nostril daily   levothyroxine 50 mcg tablet   Yes No   Sig: Take 50 mcg by mouth daily in the early morning   metroNIDAZOLE (METROGEL) 1 % gel   Yes No   Sig: Apply 1 application topically daily   polyethylene glycol-propylene glycol (SYSTANE) 0 4-0 3 %   Yes No   Sig: Apply 1 drop to eye Three times a day   raloxifene (EVISTA) 60 mg tablet   Yes No   Sig: Take 60 mg by mouth daily   Patient not taking: Reported on 2022   raloxifene (EVISTA) 60 mg tablet   Yes No   Sig: Take 60 mg by mouth daily   Patient not taking: Reported on 2023   simvastatin (ZOCOR) 10 mg tablet   Yes No   Sig: Take 10 mg by mouth   sucralfate (CARAFATE) 1 g tablet   Yes No   Sig: Take 1 g by mouth in the morning and 1 g in the evening        Facility-Administered Medications: None       Past Medical History:   Diagnosis Date   • Asthma    • Cataract    • Disease of thyroid gland    • GERD (gastroesophageal reflux disease)    • Hyperlipidemia    • Kidney stone    • Melanoma ([facility] [address] )        Past Surgical History:   Procedure Laterality Date   • COLONOSCOPY     • EYE SURGERY Bilateral     preventative glaucoma surgery • ROTATOR CUFF REPAIR Right     6/2022   • THYROID SURGERY         Family History   Problem Relation Age of Onset   • Hypertension Father    • Cancer Maternal Aunt         lip cancer     I have reviewed and agree with the history as documented      E-Cigarette/Vaping   • E-Cigarette Use Never User      E-Cigarette/Vaping Substances   • Nicotine No    • THC No    • CBD No    • Flavoring No    • Other No    • Unknown No      Social History     Tobacco Use   • Smoking status: Never     Passive exposure: Past   • Smokeless tobacco: Never   Vaping Use   • Vaping Use: Never used   Substance Use Topics   • Alcohol use: Not Currently   • Drug use: Never       Review of Systems    Physical Exam  Physical Exam    Vital Signs  ED Triage Vitals [02/27/23 0136]   Temperature Pulse Respirations Blood Pressure SpO2   98 5 °F (36 9 °C) 81 18 (!) 173/70 100 %      Temp Source Heart Rate Source Patient Position - Orthostatic VS BP Location FiO2 (%)   Temporal Monitor Lying Left arm --      Pain Score       No Pain           Vitals:    02/27/23 0400 02/27/23 0430 02/27/23 0500 02/27/23 0553   BP: 126/60 129/63 158/69 141/65   Pulse: 78 76 80 89   Patient Position - Orthostatic VS:   Lying Lying         Visual Acuity      ED Medications  Medications   sodium chloride 0 9 % bolus 1,000 mL (0 mL Intravenous Stopped 2/27/23 0423)       Diagnostic Studies  Results Reviewed     Procedure Component Value Units Date/Time    HS Troponin I 2hr [[id number]]  (Normal) Collected: 02/27/23 0508    Lab Status: Final result Specimen: Blood from Line, Venous Updated: 02/27/23 0536     hs TnI 2hr 7 ng/L      Delta 2hr hsTnI 2 ng/L     HS Troponin 0hr (reflex protocol) [[id number]]  (Normal) Collected: 02/27/23 0148    Lab Status: Final result Specimen: Blood from Arm, Right Updated: 02/27/23 0226     hs TnI 0hr 5 ng/L     Comprehensive metabolic panel [[id number]]  (Abnormal) Collected: 02/27/23 0148    Lab Status: Final result Specimen: Blood from Arm, Right Updated: 02/27/23 0219     Sodium 139 mmol/L      Potassium 4 0 mmol/L      Chloride 104 mmol/L      CO2 26 mmol/L      ANION GAP 9 mmol/L      BUN 26 mg/dL      Creatinine 0 65 mg/dL      Glucose 94 mg/dL      Calcium 10 1 mg/dL      AST 17 U/L      ALT 16 U/L      Alkaline Phosphatase 75 U/L      Total Protein 7 0 g/dL      Albumin 4 6 g/dL      Total Bilirubin 0 51 mg/dL      eGFR 87 ml/min/1 73sq m     Narrative:      National Kidney Disease Foundation guidelines for Chronic Kidney Disease (CKD):   •  Stage 1 with normal or high GFR (GFR > 90 mL/min/1 73 square meters)  •  Stage 2 Mild CKD (GFR = 60-89 mL/min/1 73 square meters)  •  Stage 3A Moderate CKD (GFR = 45-59 mL/min/1 73 square meters)  •  Stage 3B Moderate CKD (GFR = 30-44 mL/min/1 73 square meters)  •  Stage 4 Severe CKD (GFR = 15-29 mL/min/1 73 square meters)  •  Stage 5 End Stage CKD (GFR <15 mL/min/1 73 square meters)  Note: GFR calculation is accurate only with a steady state creatinine    Lipase [[id number]]  (Normal) Collected: 02/27/23 0148    Lab Status: Final result Specimen: Blood from Arm, Right Updated: 02/27/23 0219     Lipase 70 u/L     CBC and differential [[id number]] Collected: 02/27/23 0148    Lab Status: Final result Specimen: Blood from Arm, Right Updated: 02/27/23 0201     WBC 8 05 Thousand/uL      RBC 4 82 Million/uL      Hemoglobin 15 0 g/dL      Hematocrit 44 0 %      MCV 91 fL      MCH 31 1 pg      MCHC 34 1 g/dL      RDW 12 5 %      MPV 9 3 fL      Platelets 309 Thousands/uL      nRBC 0 /100 WBCs      Neutrophils Relative 55 %      Immat GRANS % 1 %      Lymphocytes Relative 32 %      Monocytes Relative 10 %      Eosinophils Relative 1 %      Basophils Relative 1 %      Neutrophils Absolute 4 51 Thousands/µL      Immature Grans Absolute 0 06 Thousand/uL      Lymphocytes Absolute 2 56 Thousands/µL      Monocytes Absolute 0 78 Thousand/µL      Eosinophils Absolute 0 10 Thousand/µL      Basophils Absolute 0 04 Thousands/µL XR chest portable    (Results Pending)              Procedures  Procedures         ED Course  ED Course as of 02/27/23 0609   [facility] Feb 27, 2023   0147 Procedure Note: EKG  Date/Time: 02/27/23 1:47 AM   Performed by: Julia Michael  Authorized by: Julia Michael  Indications / Diagnosis: CP  ECG reviewed by me, the ED Provider: yes   The EKG demonstrates:  Rhythm: normal sinus  Intervals: normal intervals  Axis: normal axis  QRS/Blocks: normal QRS  ST Changes:No acute ST Changes, no STD/OMAR        0500 Feeling better, no active chest pain at this time,Awaiting delta troponin, patient has an appointment to follow-up with [address] [place] [address] cardiology at 8 AM, will discuss with cardiology in regards to safety of outpatient follow-up versus admission for further inpatient evaluation and possible catheterization   0547 Discussed with cardiology In regards to admission for further evaluation however as troponin is essentially negative, EKG without acute ischemic changes at this time patient okay to follow-up as an outpatient and attend the appointment this morning at 8 AM careful return precautions discussed with patient             HEART Risk Score    Flowsheet Row Most Recent Value   Heart Score Risk Calculator    History 1 Filed at: 02/27/2023 0538   ECG 1 Filed at: 02/27/2023 3889   Age 2 Filed at: 02/27/2023 0221   Risk Factors 0 Filed at: 02/27/2023 0539   Troponin 0 Filed at: 02/27/2023 2752   HEART Score 5 Filed at: 02/27/2023 [address] Making  49-year-old female with left-sided chest pain differential diagnosis includes ACS, angina, asthma exacerbation, musculoskeletal pain, anxiety  PE less likely given no shortness of breath, no pleuritic chest pain, normal O2 saturation    Patient did have elevated heart rate in 120s, will obtain EKG to evaluate for arrhythmia, acute ischemic changes, will obtain lab work will reevaluate    Amount and/or Complexity of Data Reviewed  Labs: ordered  Radiology: ordered  Disposition  Final diagnoses:   Chest pain     Time reflects when diagnosis was documented in both MDM as applicable and the Disposition within this note     Time User Action Codes Description Comment    2/27/2023  5:49 AM Quyen Radha Add [R07 9] Chest pain       ED Disposition     ED Disposition   Discharge    Condition   Stable    Date/Time   Mon Feb 27, 2023  5:50 AM    Comment   Lupe Azul discharge to home/self care  Follow-up Information     Follow up With Specialties Details Why Contact Info Additional Information    Destiny Allen DO Family Medicine Schedule an appointment as soon as possible for a visit   [address] [place] [phone]        [address] Emergency Department Emergency Medicine  If symptoms worsen [address] [place],[address]D [address]  [address] [facility] Emergency Department, [address] [place] [address] 10    [facility] Cardiology Go today  [place] [id number]  [address] [place] [address], [id number] [phone]          Patient's Medications   Discharge Prescriptions    No medications on file       No discharge procedures on file      PDMP Review     None          ED Provider  Electronically Signed by           Bhavna Veloz DO  02/27/23 0132

## 2023-02-27 NOTE — TELEPHONE ENCOUNTER
Progress Notes by Jaspal Lozano MD2/27/2023  Signed    1  Abnormal cardiovascular stress test  - Ambulatory Referral to Cardiology  - Case Request Cath Lab: Cardiac Coronary Angiogram; Standing  - Case Request Cath Lab: Cardiac Coronary Angiogram  - aspirin (ECOTRIN LOW STRENGTH) 81 mg EC tablet; Take 1 tablet (81 mg total) by mouth daily Dispense: 30 tablet; Refill: 3     nitroglycerin (NITROSTAT) 0 4 mg SL tablet; Place 1 tablet (0 4 mg total) under the tongue every 5 (five) minutes as needed for chest pain Dispense: 30 tablet; Refill: 0  - Case Request Cath Lab: Cardiac Coronary Angiogram; Standing  - Case Request Cath Lab: Cardiac Coronary Angiogram  - aspirin (ECOTRIN LOW STRENGTH) 81 mg EC tablet; Take 1 tablet (81 mg total) by mouth daily Dispense: 30 tablet; Refill: 3       With a low risk stress result, it would be reasonable to trial medical therapy prior to proceeding with cardiac catheterization  However, due to her moderate to high risk stress result we will proceed with cardiac catheterization at this time

## 2023-02-27 NOTE — PROGRESS NOTES
She has been having exertional shortness of breath, typical of her asthma, but lately accompanied by a tightness in her chest radiating down into her abdomen  She has had these symptoms for a few years  However, lately she has noticed that the symptoms occur with lower level of exertion during her walks and she shared this with her pulmonologist who ordered a stress test for further evaluation  Last night, she woke up to use the bathroom and she felt soreness through the left side of her chest  She put on her pulse oximeter and her heart was racing in the 120s  Her ophthalmologist has previously noted that she had sclerotic changes of a retinal artery and advised that she follow-up with PCP to rule out possible cardiovascular or metabolic contributions       Lipid panel 9/28/22 -- reviewed,  mg/dL, HDL 76 mg/dL, TG 81 mg/dL, LDL 70 mg/dL

## 2023-02-27 NOTE — DISCHARGE INSTRUCTIONS
Please go to your outpatient appointment this morning, if you develop worsening symptoms please return to the emergency department for evaluation

## 2023-02-27 NOTE — H&P (VIEW-ONLY)
[facility]  Office Consultation  Samuel Garcia 74 y o  female MRN: [id number]        Chief Complaint    Chief Complaint   Patient presents with   • Consult     Referred by urgent care    • Chest Pain     Sharp to squeezing randomly    • Shortness of Breath     Has been occurring since stress test occurs at rest   • Rapid Heart Rate     Fast and hard- occurs randomly       Referring Provider: ERICH Romero    Impression & Plan:    1  Abnormal cardiovascular stress test  - Ambulatory Referral to Cardiology  - Case Request Cath Lab: Cardiac Coronary Angiogram; Standing  - Case Request Cath Lab: Cardiac Coronary Angiogram  - aspirin (ECOTRIN LOW STRENGTH) 81 mg EC tablet; Take 1 tablet (81 mg total) by mouth daily  Dispense: 30 tablet; Refill: 3    2  Chest pain due to myocardial ischemia, unspecified ischemic chest pain type  - metoprolol tartrate (LOPRESSOR) 25 mg tablet; Take 1 tablet (25 mg total) by mouth every 12 (twelve) hours  Dispense: 60 tablet; Refill: 3  - rosuvastatin (CRESTOR) 20 MG tablet; Take 1 tablet (20 mg total) by mouth daily  Dispense: 30 tablet; Refill: 3  - nitroglycerin (NITROSTAT) 0 4 mg SL tablet; Place 1 tablet (0 4 mg total) under the tongue every 5 (five) minutes as needed for chest pain  Dispense: 30 tablet; Refill: 0  - Case Request Cath Lab: Cardiac Coronary Angiogram; Standing  - Case Request Cath Lab: Cardiac Coronary Angiogram  - aspirin (ECOTRIN LOW STRENGTH) 81 mg EC tablet; Take 1 tablet (81 mg total) by mouth daily  Dispense: 30 tablet; Refill: 3      Ms Azul has typical anginal symptoms with a positive exercise stress echocardiogram   Her result is at least moderate if not high risk for multivessel disease due to apparent failure of the left ventricle to augment properly with exercise  The only inducible regional wall motion abnormality was the inferior wall      We will start medical antianginal therapy with metoprolol tartrate 25 mg twice daily, and sublingual nitroglycerin as needed  Start aspirin 81 mg daily  Start rosuvastatin 20 mg daily  With a low risk stress result, it would be reasonable to trial medical therapy prior to proceeding with cardiac catheterization  However, due to her moderate to high risk stress result we will proceed with cardiac catheterization at this time  We will see Marielena Malcolm back in 2 months for routine follow-up  HPI: Marielena Malcolm is a 74y o  year old female with asthma presenting for further evaluation of chest pain  She has been having exertional shortness of breath, typical of her asthma, but lately accompanied by a tightness in her chest radiating down into her abdomen  She has had these symptoms for a few years  However, lately she has noticed that the symptoms occur with lower level of exertion during her walks and she shared this with her pulmonologist who ordered a stress test for further evaluation       Last night, she woke up to use the bathroom and she felt soreness through the left side of her chest  She put on her pulse oximeter and her heart was racing in the 120s  She presented to [address] [place] [address] emergency department where she was ruled out for ACS by EKG and serial troponins  She was discharged to outpatient follow-up      Her ophthalmologist has previously noted that she had sclerotic changes of a retinal artery and advised that she follow-up with PCP to rule out possible cardiovascular or metabolic contributions       I personally reviewed and independently reinterpreted the echo stress from 2/22/2023; patient exercised for 6 minutes 1 second, 169 bpm, 115% MPHR, 7 0 METS limited by chest pain  There are horizontal, 1 mm ST depressions in the inferior leads II, III, aVF, precordial ST segment changes do not meet diagnostic criteria for ischemia  EKG is overall consistent with ischemia    The baseline rest echocardiogram shows normal ejection fraction 55 to 60% with no regional wall motion abnormalities; at peak stress there is akinesis of the basal to mid inferior wall as well as a failure of the LV to properly augment  Cardiac testing:       EKG reviewed personally:   n/a      Relevant Laboratory Studies:  (Laboratory studies personally reviewed)  Lipid panel 9/28/22 -- reviewed,  mg/dL, HDL 76 mg/dL, TG 81 mg/dL, LDL 70 mg/dL  CMP 2/27/2023 reviewed--normal renal function, within normal limits  High-sensitivity troponin 2/27/2023 reviewed--5 at 0-hour, 7 at 2-hour  CBC 2/27/2023 reviewed--WNL        Review of Systems   Constitutional: Negative for activity change and fatigue  HENT: Negative for facial swelling  Eyes: Negative for visual disturbance  Respiratory: Negative for chest tightness and shortness of breath  Cardiovascular: Negative for chest pain, palpitations and leg swelling  Gastrointestinal: Negative for nausea and vomiting  Musculoskeletal: Negative for myalgias  Skin: Negative for pallor  Allergic/Immunologic: Negative for immunocompromised state  Neurological: Negative for dizziness, syncope and light-headedness  Psychiatric/Behavioral: Negative for agitation and confusion  The patient is nervous/anxious            Past Medical History:   Diagnosis Date   • Asthma    • Cataract    • Disease of thyroid gland    • GERD (gastroesophageal reflux disease)    • Hyperlipidemia    • Kidney stone    • Melanoma ([facility] [address] ) 2022     Past Surgical History:   Procedure Laterality Date   • COLONOSCOPY     • EYE SURGERY Bilateral     preventative glaucoma surgery   • ROTATOR CUFF REPAIR Right     6/2022   • THYROID SURGERY       Social History     Substance and Sexual Activity   Alcohol Use Not Currently     Social History     Substance and Sexual Activity   Drug Use Never     Social History     Tobacco Use   Smoking Status Never   • Passive exposure: Past   Smokeless Tobacco Never     Family History   Problem Relation Age of Onset   • Hypertension Father    • Cancer Maternal Aunt         lip cancer       Allergies: Allergies   Allergen Reactions   • Amoxicillin Abdominal Pain, GI Intolerance and Nausea Only   • Clotrimazole Vomiting   • Cyclobenzaprine Nausea Only   • Naproxen Nausea Only   • Nystatin Nausea Only   • Other      Very sensitive to strong perfumes, sprays, smoke and exercise etc   Recently dx with asthma   • Tramadol Nausea Only       Medications (as of START of this encounter): Outpatient Medications Prior to Visit   Medication Sig Dispense Refill   • albuterol (PROVENTIL HFA,VENTOLIN HFA) 90 mcg/act inhaler Inhale 2 puffs every 4 (four) hours as needed for shortness of breath 18 g 5   • Breo Ellipta 200-25 MCG/ACT inhaler INHALE 1 PUFF DAILY RINSE MOUTH AFTER USE  60 each 3   • Cholecalciferol 50 MCG (2000 UT) CAPS Take 1 tablet by mouth daily     • fluticasone (VERAMYST) 27 5 MCG/SPRAY nasal spray 1 spray into each nostril daily     • Influenza Virus Vacc Split PF 0 5 ML YURIDIA inject 0 5 milliliter intramuscularly     • levothyroxine 50 mcg tablet Take 50 mcg by mouth daily in the early morning     • metroNIDAZOLE (METROGEL) 1 % gel Apply 1 application topically daily     • Multiple Vitamins-Minerals (PRESERVISION AREDS 2 PO) Take by mouth     • Peak Flow Meter NANDA 1 Act by Does not apply route 2 (two) times a day     • polyethylene glycol-propylene glycol (SYSTANE) 0 4-0 3 % Apply 1 drop to eye Three times a day     • raloxifene (EVISTA) 60 mg tablet Take 60 mg by mouth daily     • sucralfate (CARAFATE) 1 g tablet Take 1 g by mouth in the morning and 1 g in the evening       • simvastatin (ZOCOR) 10 mg tablet Take 10 mg by mouth     • Acetaminophen Extra Strength 500 MG tablet  (Patient not taking: Reported on 12/12/2022)     • albuterol (2 5 mg/3 mL) 0 083 % nebulizer solution Take 1 vial (2 5 mg total) by nebulization every 6 (six) hours as needed for wheezing or shortness of breath (Patient not taking: Reported on 12/12/2022) 120 vial 6 • budesonide (Pulmicort) 0 5 mg/2 mL nebulizer solution Take 2 mL (0 5 mg total) by nebulization 2 (two) times a day Rinse mouth after use  (Patient not taking: Reported on 12/12/2022) 240 mL 5   • conjugated estrogens (PREMARIN) vaginal cream Insert 1 g into the vagina once a week     • doxycycline (PERIOSTAT) 20 MG tablet  (Patient not taking: Reported on 12/12/2022)     • raloxifene (EVISTA) 60 mg tablet Take 60 mg by mouth daily (Patient not taking: Reported on 1/23/2023)       No facility-administered medications prior to visit  Vitals:    02/27/23 0821   BP: 126/60   Pulse: 84     Weight (last 2 days)     Date/Time Weight    02/27/23 0821 51 4 (113 4)          General: Luisito Reid is a well appearing female, in no acute distress, sitting comfortably  HEENT: moist mucous membranes, EOMI  Neck:  No JVD, supple, trachea midline   Cardiovascular: unremarkable S1/S2, regular rate and rhythm, no murmurs, rubs or gallops   Pulmonary: normal respiratory effort, CTAB   Abdomen: soft and nondistended  Extremities: No lower extremity edema  Warm and well perfused extremities  Neuro: no focal motor deficits, AAOx3 (person, place, time)  Psych: Normal mood and affect, cooperative        Time Spent:  Total time (face-to-face and non-face-to-face) spent on today's visit was 40 minutes  This includes preparation for the visits (i e  reviewing test results), performance of a medically appropriate history and examination, and orders for medications, tests or other procedures  This time is exclusive of procedures performed and time spent teaching  Nawaf Alcocer MD    This note was completed in part utilizing [facility] [employer] recognition software  Grammatical errors, random word insertion, spelling mistakes, occasional wrong word or "sound-alike" substitutions and incomplete sentences may be an occasional consequence of the system secondary to software limitations, ambient noise and hardware issues   At the time of dictation, efforts were made to edit, clarify and /or correct errors  Please read the chart carefully and recognize, using context, where substitutions have occurred  If you have any questions or concerns about the context, text or information contained within the body of this dictation, please contact myself, the provider, for further clarification

## 2023-02-27 NOTE — TELEPHONE ENCOUNTER
Patient scheduled for Left Heart CATH on 3/13/23 at [facility] with Dr Maryam Blackwood  Instructions sent to patient through [address]  Patient aware of all general instructions  Medication holds:   N/A    Labs to be done by:  N/A ( patient already did CMP / CBC on 2/27/23)    Insurance: Greg CHRISTIANSON     Please obtain auth       Thank you,  Keyshawn Oakes

## 2023-02-27 NOTE — PATIENT INSTRUCTIONS
Nitroglycerin Tablets (0 04 mg tablets) -- Dissolvable Under the Tongue    1  If you have an episode of chest pain/pressure that does not quickly resolve with rest, dissolve one nitroglycerin tablet under your tongue  2  Wait 3-5 minutes  3  If pain/pressure has not resolved, begin dissolving a second tablet under your tongue  4  Repeat, if you are starting a third tablet, call 9-1-1 or go to the nearest emergency room  Nitroglycerin (By mouth)   Nitroglycerin (uni-uorr-VKNH-er-in)  Treats or prevents angina (chest pain)  This medicine is a nitrate  Brand Name(s): Nitro-Time, Nitrostat   There may be other brand names for this medicine  When This Medicine Should Not Be Used: This medicine is not right for everyone  Do not use it if you had an allergic reaction to nitroglycerin or similar medicines  How to Use This Medicine:   Long Acting Capsule, Tablet, Long Acting Tablet  Your doctor will tell you how much medicine to use  Do not use more than directed  Sit down before you take this medicine, because it could make you lightheaded  Most people use this medicine for only part of the day or as needed  Extended-release capsule or extended-release tablet:   Take on an empty stomach with a full glass of water  Swallow whole  Do not crush, break, or chew it  Buccal tablet:   Place it between your gum and upper cheek or upper lip  Let the tablet dissolve slowly in your mouth over several hours  Do not chew, crush, or swallow the tablet or put it under your tongue  Avoid drinking anything hot while the tablet is in your mouth and do not touch the tablet with your tongue  Do not go to sleep with a buccal tablet in your mouth  Sublingual tablet:   Wet the tablet with saliva and place it under your tongue or inside your cheek  Let the tablet dissolve  Do not chew, crush, or swallow the tablet  Wait 5 minutes  If you still have pain, take a second tablet  Do not take more than 3 tablets in 15 minutes   If you still have pain, this is an emergency  Call 911  Do not drive yourself to the hospital   [facility] may use this medicine 5 to 10 minutes before an activity that can cause angina  This may help prevent the attack  Read and follow the patient instructions that come with this medicine  Talk to your doctor or pharmacist if you have any questions  Missed dose: Take a dose as soon as you remember  If it is almost time for your next dose, wait until then and take a regular dose  Do not take extra medicine to make up for a missed dose  Store the medicine in a closed container at room temperature, away from heat, moisture, and direct light  Store the sublingual tablets at room temperature in the original glass container, away from heat, moisture, and direct light  How to Store and Dispose of This Medicine:   Store the medicine at room temperature in a closed container, away from heat, moisture, and direct light  Ask your pharmacist, doctor, or health caregiver about the best way to dispose of any outdated medicine or medicine no longer needed  Keep all medicine out of the reach of children and never share your medicine with anyone  Drugs and Foods to Avoid:   Ask your doctor or pharmacist before using any other medicine, including over-the-counter medicines, vitamins, and herbal products  Do not use this medicine if you are also using avanafil, riociguat, sildenafil, tadalafil, or vardenafil  Some medicines can affect how nitroglycerin works  Tell your doctor if you are using any of the following: Alteplase, aspirin, heparin  Blood pressure medicine  Diuretic (water pill)  Ergot medicine  Tell your doctor if you are using any medicine that makes your mouth dry, such as medicines that treat depression  Do not drink alcohol while you are using this medicine    Warnings While Using This Medicine:   Tell your doctor if you are pregnant or breastfeeding, or if you have kidney disease, anemia, congestive heart failure, enlarged heart, low blood pressure, or a recent heart attack  Tell your doctor if you have a history of a head injury  This medicine may make you dizzy or lightheaded  Do not drive or do anything else that could be dangerous until you know how this medicine affects you  These symptoms may be worse if you drink alcohol  Medicines that treat chest pain sometimes cause headaches when you first start using it  This is normal  Do not stop using the medicine or change the time you use it to avoid headaches  Ask your doctor if you can take aspirin or acetaminophen to treat the headache  Tell any doctor or dentist who treats you that you are using this medicine  This medicine may affect certain medical test results  Keep all medicine out of the reach of children  Never share your medicine with anyone  Possible Side Effects While Using This Medicine:   Call your doctor right away if you notice any of these side effects: Allergic reaction: Itching or hives, swelling in your face or hands, swelling or tingling in your mouth or throat, chest tightness, trouble breathing  Blurred vision, dry mouth  Increased chest pain, fast or slow heartbeat  Severe or ongoing dizziness, lightheadedness, or fainting  Throbbing, severe, or ongoing headache, confusion, low fever, or trouble seeing  Trouble breathing, cold sweat, blue skin, lips, or nails  Warmth or redness in your face, neck, arms, or upper chest  If you notice these less serious side effects, talk with your doctor:   Nausea, vomiting, weakness  If you notice other side effects that you think are caused by this medicine, tell your doctor  Call your doctor for medical advice about side effects  You may report side effects to FDA at 8-253-FDA-4883    © Copyright [employer] 2022 Information is for End User's use only and may not be sold, redistributed or otherwise used for commercial purposes  The above information is an  only   It is not intended as medical advice for individual conditions or treatments  Talk to your doctor, nurse or pharmacist before following any medical regimen to see if it is safe and effective for you

## 2023-02-27 NOTE — TELEPHONE ENCOUNTER
Left message w/ Silvino Kraus to have patient call and schedule Left Heart Cath procedure       Thanks,  Jennifer

## 2023-02-28 ENCOUNTER — TELEPHONE (OUTPATIENT)
Dept: CARDIOLOGY CLINIC | Facility: CLINIC | Age: 73
End: 2023-02-28

## 2023-02-28 ENCOUNTER — OFFICE VISIT (OUTPATIENT)
Dept: PHYSICAL THERAPY | Facility: CLINIC | Age: 73
End: 2023-02-28

## 2023-02-28 DIAGNOSIS — M54.2 NECK PAIN ON RIGHT SIDE: Primary | ICD-10-CM

## 2023-02-28 DIAGNOSIS — M25.511 ACUTE PAIN OF RIGHT SHOULDER: ICD-10-CM

## 2023-02-28 LAB
ATRIAL RATE: 83 BPM
P AXIS: 61 DEGREES
PR INTERVAL: 156 MS
QRS AXIS: 81 DEGREES
QRSD INTERVAL: 86 MS
QT INTERVAL: 370 MS
QTC INTERVAL: 434 MS
T WAVE AXIS: 45 DEGREES
VENTRICULAR RATE: 83 BPM

## 2023-02-28 NOTE — TELEPHONE ENCOUNTER
Per [facility] [id number], Bing Nageotte is not required for [address] [place] [address] for this [facility] plan

## 2023-02-28 NOTE — TELEPHONE ENCOUNTER
Kelsy Garcia called stating cardiac rehab needs to see something in writing that she may return  Dale fax# [phone]

## 2023-02-28 NOTE — PROGRESS NOTES
Daily Note     Today's date: 2023  Patient name: Luisito Reid  : 1950  MRN: [id number]  Referring provider: Bety Trinidad DO  Dx:   Encounter Diagnosis     ICD-10-CM    1  Neck pain on right side  M54 2       2  Acute pain of right shoulder  M25 511         ADDENDUM 3/2/23: Patient called to cancel appointments for the next two weeks due to having chest soreness following last session  She reports that pain was not severe, but lasted for several hours  No symptoms currently  She thought it could have been related to asthma like she has had in the past  Recommended patient call cardiologist to update them on her symptoms  She verbalized understanding  Subjective: patient returns to therapy after cancellation from temporary hold put on PT from pulmonologist after patient had abnormal stress test  She saw cardiologist yesterday and was given verbal clearance to return to PT for neck/shoulder pain  She reports that she has catheterization procedure scheduled in march  She hasn't been having much upper trap pain lately  Objective: See treatment diary below      Assessment: Patient tolerated joint mobilizations to cervical spine and STM to upper trap without complaint of pain  She tolerated shoulder ROM and pec stretching well  Patient would benefit from continued skilled PT per plan of care to address impairments and improve function  Plan: Continue per plan of care  Precautions: asthma, h/o R RTC repair, (per shoulder surgeon, no shoulder strengthening for now)   EPOC 4/3/23    Manuals          Distraction/sub occipital release EVELIO EVELIO EVELIO EVELIO         Upglides and side glides cervical spine gr 3-4 EVELIO EVELIO EVELIO EVELIO         STM UT EVELIO EVELIO EVELIO EVELIO                      Neuro Re-Ed                          Supine Chin tuck 10x5" 10x5" 10x5" 10x5"         Scap retraction 10x5"            Chin tuck with rotation 5x5" ea 5x5" ea 5x5" ea 5x5" ea                      Ther Ex UT stretch 3x15" ea 3x15" ea 3x15" ea 3x15" ea         Doorway pec major stretch 3x15"   3x15"         Doorway pec minor stretch 3x15"   3x15"         Pt edu on home program, pillow positioning 8' 3'           Supine wand flex AAROM 4x10" 5x10" 10x10"          Cross body adduction 4x10"   5x10"         Cervical AROM rotation supine  5x5" 5x5" 5x5"                                                                                                    Ther Activity                                       Gait Training                                       Modalities             Moist hot pack start 8' 8'

## 2023-03-02 ENCOUNTER — APPOINTMENT (OUTPATIENT)
Dept: PHYSICAL THERAPY | Facility: CLINIC | Age: 73
End: 2023-03-02

## 2023-03-03 ENCOUNTER — TELEPHONE (OUTPATIENT)
Dept: CARDIOLOGY CLINIC | Facility: CLINIC | Age: 73
End: 2023-03-03

## 2023-03-03 NOTE — TELEPHONE ENCOUNTER
Take metoprolol 25 mg three times daily until cath   OK to hold PT    Spoke to Lucio Hernandez and advised she currently taking it bid, are you increasing to TID due to not being able to do PT? Also the morning of Cath, she gets very nausea when taking medication/cardiac meds without food  Can she hold metoprolol, aspirin,crestor?     Please advise

## 2023-03-03 NOTE — TELEPHONE ENCOUNTER
P/C left a message that had started PT for neck and shoulder and on the ride home had chest discomfit  Pt didn't  use her nitroglycerin tablets, she used her albuterol and it eventually went away  Pt states never knows if it is her asthma or cardiac  Pt has decided not to do PT until after her procedure 3/14/23      Please advise

## 2023-03-07 ENCOUNTER — APPOINTMENT (OUTPATIENT)
Dept: PHYSICAL THERAPY | Facility: CLINIC | Age: 73
End: 2023-03-07

## 2023-03-09 ENCOUNTER — APPOINTMENT (OUTPATIENT)
Dept: PHYSICAL THERAPY | Facility: CLINIC | Age: 73
End: 2023-03-09

## 2023-03-12 ENCOUNTER — NURSE TRIAGE (OUTPATIENT)
Dept: OTHER | Facility: OTHER | Age: 73
End: 2023-03-12

## 2023-03-12 NOTE — TELEPHONE ENCOUNTER
Regarding: medication question  ----- Message from Mariana Foster sent at 3/12/2023 10:49 AM EDT -----  " I have a test tomorrow and I wanted to know if it's okay for me to take one zofran if I continue to feel nauseous  "

## 2023-03-12 NOTE — TELEPHONE ENCOUNTER
Reason for Disposition  • Unexplained nausea    Answer Assessment - Initial Assessment Questions  1  NAUSEA SEVERITY: "How bad is the nausea?" (e g , mild, moderate, severe; dehydration, weight loss)    - MILD: loss of appetite without change in eating habits    - MODERATE: decreased oral intake without significant weight loss, dehydration, or malnutrition    - SEVERE: inadequate caloric or fluid intake, significant weight loss, symptoms of dehydration      mild  2  ONSET: "When did the nausea begin?"      This morning  3  VOMITING: "Any vomiting?" If Yes, ask: "How many times today?"      none  4  RECURRENT SYMPTOM: "Have you had nausea before?" If Yes, ask: "When was the last time?" "What happened that time?"      occasional nausea prescribed prn basis    5  CAUSE: "What do you think is causing the nausea?"      She had a piece of chocolate cake last night and has mild nausea  since this morning    Protocols used: NAUSEA-ADULT-

## 2023-03-13 ENCOUNTER — HOSPITAL ENCOUNTER (OUTPATIENT)
Facility: HOSPITAL | Age: 73
Setting detail: OUTPATIENT SURGERY
Discharge: HOME | End: 2023-03-13
Attending: INTERNAL MEDICINE | Admitting: INTERNAL MEDICINE

## 2023-03-13 ENCOUNTER — NURSE TRIAGE (OUTPATIENT)
Dept: OTHER | Facility: OTHER | Age: 73
End: 2023-03-13

## 2023-03-13 VITALS
DIASTOLIC BLOOD PRESSURE: 61 MMHG | WEIGHT: 112 LBS | TEMPERATURE: 98.8 F | RESPIRATION RATE: 18 BRPM | OXYGEN SATURATION: 100 % | HEIGHT: 61 IN | HEART RATE: 69 BPM | BODY MASS INDEX: 21.14 KG/M2 | SYSTOLIC BLOOD PRESSURE: 121 MMHG

## 2023-03-13 DIAGNOSIS — R94.39 ABNORMAL CARDIOVASCULAR STRESS TEST: ICD-10-CM

## 2023-03-13 DIAGNOSIS — I25.9 CHEST PAIN DUE TO MYOCARDIAL ISCHEMIA, UNSPECIFIED ISCHEMIC CHEST PAIN TYPE: Primary | ICD-10-CM

## 2023-03-13 PROBLEM — Z03.89 CORONARY ARTERY DISEASE EXCLUDED: Status: ACTIVE | Noted: 2023-03-13

## 2023-03-13 LAB
ATRIAL RATE: 71 BPM
CHOLEST SERPL-MCNC: 139 MG/DL
EST. AVERAGE GLUCOSE BLD GHB EST-MCNC: 103 MG/DL
HBA1C MFR BLD: 5.2 %
HDLC SERPL-MCNC: 89 MG/DL
LDLC SERPL CALC-MCNC: 36 MG/DL (ref 0–100)
LDLC SERPL DIRECT ASSAY-MCNC: 42 MG/DL (ref 0–100)
NONHDLC SERPL-MCNC: 50 MG/DL
P AXIS: 54 DEGREES
PR INTERVAL: 148 MS
QRS AXIS: 52 DEGREES
QRSD INTERVAL: 92 MS
QT INTERVAL: 392 MS
QTC INTERVAL: 425 MS
T WAVE AXIS: 40 DEGREES
TRIGL SERPL-MCNC: 72 MG/DL
VENTRICULAR RATE: 71 BPM

## 2023-03-13 DEVICE — ANGIO-SEAL VIP VASCULAR CLOSURE DEVICE
Type: IMPLANTABLE DEVICE | Site: GROIN | Status: FUNCTIONAL
Brand: ANGIO-SEAL

## 2023-03-13 RX ORDER — ONDANSETRON 2 MG/ML
INJECTION INTRAMUSCULAR; INTRAVENOUS CODE/TRAUMA/SEDATION MEDICATION
Status: DISCONTINUED | OUTPATIENT
Start: 2023-03-13 | End: 2023-03-13 | Stop reason: HOSPADM

## 2023-03-13 RX ORDER — SODIUM CHLORIDE 9 MG/ML
75 INJECTION, SOLUTION INTRAVENOUS CONTINUOUS
Status: DISCONTINUED | OUTPATIENT
Start: 2023-03-13 | End: 2023-03-13 | Stop reason: HOSPADM

## 2023-03-13 RX ORDER — AMLODIPINE BESYLATE 2.5 MG/1
2.5 TABLET ORAL DAILY
Qty: 90 TABLET | Refills: 3 | Status: SHIPPED | OUTPATIENT
Start: 2023-03-13 | End: 2023-03-24 | Stop reason: SINTOL

## 2023-03-13 RX ORDER — FENTANYL CITRATE 50 UG/ML
INJECTION, SOLUTION INTRAMUSCULAR; INTRAVENOUS CODE/TRAUMA/SEDATION MEDICATION
Status: DISCONTINUED | OUTPATIENT
Start: 2023-03-13 | End: 2023-03-13 | Stop reason: HOSPADM

## 2023-03-13 RX ORDER — NITROGLYCERIN 20 MG/100ML
INJECTION INTRAVENOUS CODE/TRAUMA/SEDATION MEDICATION
Status: DISCONTINUED | OUTPATIENT
Start: 2023-03-13 | End: 2023-03-13 | Stop reason: HOSPADM

## 2023-03-13 RX ORDER — ACETAMINOPHEN 325 MG/1
650 TABLET ORAL EVERY 6 HOURS PRN
Status: DISCONTINUED | OUTPATIENT
Start: 2023-03-13 | End: 2023-03-13 | Stop reason: HOSPADM

## 2023-03-13 RX ORDER — VERAPAMIL HYDROCHLORIDE 2.5 MG/ML
INJECTION, SOLUTION INTRAVENOUS CODE/TRAUMA/SEDATION MEDICATION
Status: DISCONTINUED | OUTPATIENT
Start: 2023-03-13 | End: 2023-03-13 | Stop reason: HOSPADM

## 2023-03-13 RX ORDER — LIDOCAINE HYDROCHLORIDE 10 MG/ML
INJECTION, SOLUTION EPIDURAL; INFILTRATION; INTRACAUDAL; PERINEURAL CODE/TRAUMA/SEDATION MEDICATION
Status: DISCONTINUED | OUTPATIENT
Start: 2023-03-13 | End: 2023-03-13 | Stop reason: HOSPADM

## 2023-03-13 RX ORDER — ONDANSETRON 2 MG/ML
4 INJECTION INTRAMUSCULAR; INTRAVENOUS EVERY 8 HOURS PRN
Status: DISCONTINUED | OUTPATIENT
Start: 2023-03-13 | End: 2023-03-13 | Stop reason: HOSPADM

## 2023-03-13 RX ORDER — ASPIRIN 81 MG/1
324 TABLET, CHEWABLE ORAL ONCE
Status: COMPLETED | OUTPATIENT
Start: 2023-03-13 | End: 2023-03-13

## 2023-03-13 RX ORDER — SODIUM CHLORIDE 9 MG/ML
125 INJECTION, SOLUTION INTRAVENOUS CONTINUOUS
Status: DISCONTINUED | OUTPATIENT
Start: 2023-03-13 | End: 2023-03-13

## 2023-03-13 RX ORDER — MIDAZOLAM HYDROCHLORIDE 2 MG/2ML
INJECTION, SOLUTION INTRAMUSCULAR; INTRAVENOUS CODE/TRAUMA/SEDATION MEDICATION
Status: DISCONTINUED | OUTPATIENT
Start: 2023-03-13 | End: 2023-03-13 | Stop reason: HOSPADM

## 2023-03-13 RX ADMIN — ASPIRIN 243 MG: 81 TABLET, CHEWABLE ORAL at 08:23

## 2023-03-13 RX ADMIN — SODIUM CHLORIDE 125 ML/HR: 0.9 INJECTION, SOLUTION INTRAVENOUS at 08:24

## 2023-03-13 NOTE — DISCHARGE INSTR - AVS FIRST PAGE
1  Please see the post cardiac catheterization dishcarge instructions  No heavy lifting, greater than 10 lbs  or strenuous  activity for 48 hrs  2 Remove band aid tomorrow  Shower and wash area- wrist /groin gently with soap and water- beginning tomorrow  Rinse and pat dry  Apply new water seal band aid  Repeat this process for 5 days  No powders, creams lotions or antibiotic ointments  for 5 days  No tub baths, hot tubs or swimming for 5 days  3  Please call our office ([phone]) if you have any fever, redness, swelling, discharge from your wrist/groin access site  4 No driving for 2 days    5    Angioseal booklet

## 2023-03-13 NOTE — TELEPHONE ENCOUNTER
HEATHER on call provider; advised to contact on call for [facility] due to patient having procedure done there today  Advised patient can continue to observe for now  If she develops significant swelling, donn bleeding, or pain, to go to the ED tonight  Advised to remove dressing in the morning and inspect site and apply a bandaid  Patient wanted to send in pic; sent it to on call provider  Advised ok to monitor for now; went over ED precautions with patient and to call back with any further questions or concerns

## 2023-03-13 NOTE — TELEPHONE ENCOUNTER
Regarding: post procedure / blood issue  ----- Message from Hernandez Milligan sent at 3/13/2023  5:47 PM EDT -----  "I had a Cardiac Coronary Angiogram done today and I have noticed that the bandage by my groin is completely saturated with blood I wanted to see if this was normal  Dr Ryder

## 2023-03-13 NOTE — INTERVAL H&P NOTE
H&P reviewed  After examining the patient I find no changes in the patients condition since the H&P had been written      Vitals:    03/13/23 0837   BP: 154/69   Pulse: 73   Resp: 16   Temp: 99 °F (37 2 °C)   SpO2: 99%     For abnormal stress echo  -2/22 echo stress: 6 min for 7 METs, no CP after 115% MPHR, inferolateral ST depression at peak; possible basal-mid inferior WMA but suboptimal window  -2/22 baseline echo: LVEF 60%, no WMA, no DD, normal RV size & function, 1+ AI, RVSP 35 mmHg    ECG 03/13/23  Normal sinus rhythm, HR 71        Irene Russell MD / 03/13/23 / 9:04 AM

## 2023-03-13 NOTE — TELEPHONE ENCOUNTER
Answer Assessment - Initial Assessment Questions  1  SYMPTOM: "What's the main symptom you're concerned about?" (e g , pain, fever, vomiting)      Right groin  Had cardiac cath today; was unable to have it done from her arm  Patient stated that there is no bleeding to arm; patient stated that her dressing has gauze and it is completely red/saturated  Patient didn't look at her dressing until she just went to the bathroom  Patient does seem some blood on one of the edges of gauze  Patient had her procedure done around 10am  Patient did a few steps going into the house  Denies any oozing outside of the bandage  Patient stated the entire bandage is pink red  Denies any bruising or purple around bandage on skin  2  ONSET: "When did symptoms start?"      Just noticed the bandage now  3  SURGERY: "What surgery was performed?"      Cardiac cath  4  DATE of SURGERY: "When was surgery performed?"       3/13  6  PAIN: "Is there any pain?" If Yes, ask: "How bad is it?"  (Scale 1-10; or mild, moderate, severe)      Denies  7  FEVER: "Do you have a fever?" If Yes, ask: "What is your temperature, how was it measured, and when did it start?"      Denies  8  VOMITING: "Is there any vomiting?" If yes, ask: "How many times?"      Denies  9  BLEEDING: "Is there any bleeding?" If Yes, ask: "How much?" and "Where?"      Dressing is pink/red with blood; has some blood on the one corner     10  OTHER SYMPTOMS: "Do you have any other symptoms?" (e g , drainage from wound, painful urination, constipation)        Denies    Protocols used: POST-OP SYMPTOMS AND QUESTIONS-[facility]

## 2023-03-13 NOTE — INTERVAL H&P NOTE
H&P reviewed  After examining the patient I find no changes in the patients condition since the H&P had been written      Vitals:    03/13/23 0837   BP: 154/69   Pulse: 73   Resp: 16   Temp: 99 °F (37 2 °C)   SpO2: 99%     For abnormal stress echo  -2/22 echo stress: 6 min for 7 METs, no CP after 115% MPHR, inferolateral ST depression at peak; possible basal-mid inferior WMA but suboptimal window  -2/22 baseline echo: LVEF 60%, no WMA, no DD, normal RV size & function, 1+ AI, RVSP 35 mmHg     ECG 03/13/23  Normal sinus rhythm, HR 71         Panchito Reed MD / 03/13/23 / 9:40 AM

## 2023-03-13 NOTE — TELEPHONE ENCOUNTER
Reason for Disposition  • [1] Caller has URGENT question AND [2] triager unable to answer question    Protocols used: POST-OP SYMPTOMS AND QUESTIONS-ADULT-

## 2023-03-14 ENCOUNTER — APPOINTMENT (OUTPATIENT)
Dept: PHYSICAL THERAPY | Facility: CLINIC | Age: 73
End: 2023-03-14

## 2023-03-15 ENCOUNTER — TELEPHONE (OUTPATIENT)
Dept: CARDIOLOGY CLINIC | Facility: CLINIC | Age: 73
End: 2023-03-15

## 2023-03-15 NOTE — TELEPHONE ENCOUNTER
P/C had a cardiac angiogram on Monday tried to use her wrist unable ended up going through the groin  Noticed puffiness In her pinky finger  Called Lupe back and advised would watch for any additional swelling,drainage,fever,redness or warmth  Denies any S/S of infection  Advised take a few days, monitor and call if notices no change or worsen   Pt verbally understood

## 2023-03-16 ENCOUNTER — DOCUMENTATION (OUTPATIENT)
Dept: CARDIOLOGY CLINIC | Facility: CLINIC | Age: 73
End: 2023-03-16

## 2023-03-16 ENCOUNTER — APPOINTMENT (OUTPATIENT)
Dept: PHYSICAL THERAPY | Facility: CLINIC | Age: 73
End: 2023-03-16

## 2023-03-20 ENCOUNTER — TELEPHONE (OUTPATIENT)
Dept: CARDIOLOGY CLINIC | Facility: CLINIC | Age: 73
End: 2023-03-20

## 2023-03-20 NOTE — TELEPHONE ENCOUNTER
Has the patient to hold amlodipine if her systolic blood pressure is less than 115 ask    Called and spoke to pt and advised, pt verbally understood   Advised if no change call office, verbally understood

## 2023-03-20 NOTE — TELEPHONE ENCOUNTER
Hi, my name is Jose Amador and Doctor Dianelys Schumacher did an angiogram on me at [facility] [place] [facility] [place] and I'm calling because on Sunday yesterday when I got up I felt lightheaded and I took my blood pressure and it was 108 / 50  I was concerned about that  I don't know if I need to be or should be but I wanted to call and check and see if that is OK with the diastolic number of 50  My and I also started feeling nauseous during the night  I nausea is what I've been having off and on, mostly on and I still feel that way now  So I wanted to call and leave the that message and get advice if there's anything if I need to do or if anything needs to be changed  OK  Thank you very much  My phone number is [phone]  Marilu  Called pt and she advised that prior to starting the amlodipine medication   Was 131/57 HR 61,105/52 HR 66, 118/54 Hr 65,124/51 HR 64 that amlodipine   Next morning BP 11/54 HR 66, 106/53 HR 66,119/52 Hr 62, has been noticing lightheaded, nausea  Pt has not fainted, no chest pains, no edema LL       Amlodipine 2 5 mg qd  Aspirin 81 mg qd  Metoprolol 25 mg q 12 hr  Crestor 20 mg qd    appt 5/2/23     Please advise

## 2023-03-21 ENCOUNTER — OFFICE VISIT (OUTPATIENT)
Dept: PHYSICAL THERAPY | Facility: CLINIC | Age: 73
End: 2023-03-21

## 2023-03-21 DIAGNOSIS — M25.511 ACUTE PAIN OF RIGHT SHOULDER: ICD-10-CM

## 2023-03-21 DIAGNOSIS — M54.2 NECK PAIN ON RIGHT SIDE: Primary | ICD-10-CM

## 2023-03-21 NOTE — PROGRESS NOTES
Daily Note     Today's date: 3/21/2023  Patient name: Sanam Cox  : 1950  MRN: [id number]  Referring provider: Joe Gilliland DO  Dx:   Encounter Diagnosis     ICD-10-CM    1  Neck pain on right side  M54 2       2  Acute pain of right shoulder  M25 511           Subjective: patient returns to therapy after cardiac coronary angiogram  She was given medical clearance for return to therapy (see attached in chart)  She resumed some of her neck exercises recently, and noted some abdominal soreness following, which she was treated in PT for before  She thinks it may have been from tensing these muscles while working her neck  She had some upper trap/shoulder pain after procedure, but was able to sleep without neck/shoulder pain lately  Objective: See treatment diary below    25 deg  B/l cervical side bending  60 R, 50 L cervical rotation    Assessment: Patient presents to therapy following several week absence due to medical hold from having cardiac procedure during  She reports minimal neck/shoulder pain lately, but she has been more focused on other issues  She demonstrates improved joint mobility of cervical spine and side bending AROM  She continues to be limited with rotation AROM  Patient tolerated session well and would benefit from continued skilled PT per plan of care to address impairments and improve function  Plan: Continue per plan of care  Precautions: asthma, h/o R RTC repair, (per shoulder surgeon, no shoulder strengthening for now)   EPOC 4/3/23    Manuals 2/7 2/14 2/21 2/28 3/21        Distraction/sub occipital release EVELIO EVELIO EVELIO EVELIO EVELIO        Upglides and side glides cervical spine gr 3-4 EVELIO EVELIO EVELIO EVELIO EVELIO        [facility] EVELIO EVELIO EVELIO EVELIO EVELIO                     Neuro Re-Ed                          Supine Chin tuck 10x5" 10x5" 10x5" 10x5" 10x5"        Scap retraction 10x5"            Chin tuck with rotation 5x5" ea 5x5" ea 5x5" ea 5x5" ea                      Ther Ex             UT stretch 3x15" ea 3x15" ea 3x15" ea 3x15" ea 3x15" ea        Doorway pec major stretch 3x15"   3x15"         Doorway pec minor stretch 3x15"   3x15"         Pt edu on home program, pillow positioning 8' 3'   2'        Supine wand flex AAROM 4x10" 5x10" 10x10"  10x10"        Cross body adduction 4x10"   5x10" 5x10"        Cervical AROM rotation supine  5x5" 5x5" 5x5" 5x5"                                                                                                   Ther Activity                                       Gait Training                                       Modalities             Moist hot pack start 8' 8'

## 2023-03-22 ENCOUNTER — TELEPHONE (OUTPATIENT)
Dept: CARDIOLOGY CLINIC | Facility: CLINIC | Age: 73
End: 2023-03-22

## 2023-03-22 NOTE — TELEPHONE ENCOUNTER
Spoke to pt scheduled for Friday 24 th at [address], is this ok with you or sooner like tomorrow   Pt aware to stop amlodipine     Please advise

## 2023-03-22 NOTE — TELEPHONE ENCOUNTER
P/C having dizziness, lightheadedness when sitting,standing,walking  Pt does have a hx of tinnitus but it definitely seems more intense since started amlodipine  Sunday /50 HR 64, held amlodipine  Monday /53 HR 70   Tuesday /53 HR 63  Wednesday /48 HR 64  Pt will not be driving today, she feels like she should be in bed  Amlodipine 2 5 mg qd per Dr Azael Borrego was covering when off to hold if systolic is below 833      Aspirin 81 mg qd  Metoprolol 25 mg q 12 hrs     Advised to keep hydrated, rest    Please advise

## 2023-03-23 ENCOUNTER — OFFICE VISIT (OUTPATIENT)
Dept: PHYSICAL THERAPY | Facility: CLINIC | Age: 73
End: 2023-03-23

## 2023-03-23 DIAGNOSIS — M54.2 NECK PAIN ON RIGHT SIDE: Primary | ICD-10-CM

## 2023-03-23 DIAGNOSIS — M25.511 ACUTE PAIN OF RIGHT SHOULDER: ICD-10-CM

## 2023-03-23 NOTE — PROGRESS NOTES
Daily Note     Today's date: 3/23/2023  Patient name: Regis Gan  : 1950  MRN: [id number]  Referring provider: Nieves Gordillo DO  Dx:   Encounter Diagnosis     ICD-10-CM    1  Neck pain on right side  M54 2       2  Acute pain of right shoulder  M25 511           Subjective: patient reports no neck or shoulder pain lately  She had dizziness yesterday which she attributes to new medication  She stopped taking and feels better today  She notified cardiologist and was recommended to hold on medication and has follow up tomorrow  Objective: See treatment diary below      Assessment: Patient presented with less soft tissue restriction along upper trap today  No pain noted with any exercises today  Recommended patient continue UT and cervical rotation stretching at home  Modified pec stretching to unilateral ER on wall, to avoid strain to chest  Patient tolerated session well and would benefit from continued skilled PT per plan of care to address impairments and improve function  Plan: Continue per plan of care  Precautions: asthma, h/o R RTC repair, (per shoulder surgeon, no shoulder strengthening for now)   EPOC 4/3/23    Manuals 2/7 2/14 2/21 2/28 3/21 3/23       Distraction/sub occipital release EVELIO EVELIO EVELIO EVELIO EVELIO EVELIO       Upglides and side glides cervical spine gr 3-4 EVELIO EVELIO EVELIO EVELIO EVELIO EVELIO       [facility] EVELIO EVELIO EVELIO EVELIO EVELIO EVELIO                    Neuro Re-Ed                          Supine Chin tuck 10x5" 10x5" 10x5" 10x5" 10x5" 10x5"       Scap retraction 10x5"            Chin tuck with rotation 5x5" ea 5x5" ea 5x5" ea 5x5" ea                      Ther Ex             UT stretch 3x15" ea 3x15" ea 3x15" ea 3x15" ea 3x15" ea 3x15"       Doorway pec major stretch 3x15"   3x15"         Doorway pec minor stretch 3x15"   3x15"         Pt edu on home program, pillow positioning 8' 3'   2'        Supine wand flex AAROM 4x10" 5x10" 10x10"  10x10" 10x10"       Cross body adduction 4x10"   5x10" 5x10" 5x10" Cervical AROM rotation supine  5x5" 5x5" 5x5" 5x5" 5x5"       ER doorway stretch      5x10"                                                                                     Ther Activity                                       Gait Training                                       Modalities             Moist hot pack start 8' 8'

## 2023-03-24 ENCOUNTER — OFFICE VISIT (OUTPATIENT)
Dept: CARDIOLOGY CLINIC | Facility: CLINIC | Age: 73
End: 2023-03-24

## 2023-03-24 VITALS
DIASTOLIC BLOOD PRESSURE: 68 MMHG | SYSTOLIC BLOOD PRESSURE: 162 MMHG | HEIGHT: 61 IN | HEART RATE: 72 BPM | WEIGHT: 116 LBS | BODY MASS INDEX: 21.9 KG/M2

## 2023-03-24 DIAGNOSIS — R94.39 ABNORMAL CARDIOVASCULAR STRESS TEST: Primary | ICD-10-CM

## 2023-03-24 DIAGNOSIS — I20.8 CARDIAC SYNDROME X (HCC): ICD-10-CM

## 2023-03-24 DIAGNOSIS — R07.9 CHEST PAIN, UNSPECIFIED TYPE: ICD-10-CM

## 2023-03-24 RX ORDER — ROSUVASTATIN CALCIUM 20 MG/1
20 TABLET, COATED ORAL DAILY
Qty: 90 TABLET | Refills: 3 | Status: SHIPPED | OUTPATIENT
Start: 2023-03-24

## 2023-03-24 RX ORDER — ONDANSETRON 4 MG/1
TABLET, FILM COATED ORAL
COMMUNITY
Start: 2023-02-01

## 2023-03-24 RX ORDER — MECLIZINE HYDROCHLORIDE 25 MG/1
TABLET ORAL
COMMUNITY
Start: 2023-01-24

## 2023-03-24 RX ORDER — ASPIRIN 81 MG/1
81 TABLET ORAL DAILY
Qty: 90 TABLET | Refills: 3 | Status: SHIPPED | OUTPATIENT
Start: 2023-03-24

## 2023-03-24 NOTE — PROGRESS NOTES
Cardiology Outpatient Follow-Up Note - Princess Azul 74 y o  female MRN: [id number]      Assessment/Plan:  1  Cardiac syndrome X ([address] )  Ms Elmer Trevino has likely either vasospastic angina or microvascular angina, which may fall under the umbrella moniker "cardiac syndrome X"  This is suggested by abnormal exercise stress echo (both EKG and imaging component), normal epicardial coronaries on cardiac catheterization, and response to antianginal therapy of metoprolol 25 mg twice daily  Continue antianginal therapy with metoprolol as above  Continue aspirin 81 mg daily and rosuvastatin 20 mg daily  - rosuvastatin (CRESTOR) 20 MG tablet; Take 1 tablet (20 mg total) by mouth daily  Dispense: 90 tablet; Refill: 3  - metoprolol tartrate (LOPRESSOR) 25 mg tablet; Take 1 tablet (25 mg total) by mouth every 12 (twelve) hours  Dispense: 180 tablet; Refill: 3  - aspirin (ECOTRIN LOW STRENGTH) 81 mg EC tablet; Take 1 tablet (81 mg total) by mouth daily  Dispense: 90 tablet; Refill: 3    2  Abnormal cardiovascular stress test  - aspirin (ECOTRIN LOW STRENGTH) 81 mg EC tablet; Take 1 tablet (81 mg total) by mouth daily  Dispense: 90 tablet; Refill: 3    3  Chest pain, unspecified type      We will see Lupe Azul back in 6 months for routine follow-up  Subjective:     HPI: He Hernandez is a 74y o  year old female with asthma who presented to me on 2/27/23 with chest pain and an abnormal exercise stress echo; subsequent cardiac catheterization did not reveal occlusive epicardial CAD  She presents today for follow-up  We had started anti-anginal treatment with metoprolol prior to cardiac catheterization  After cardiac catheterization, she was started on amlodipine by Dr Kole Ricci for presume vasospastic angina  She called multiple times since then complaining of lightheadedness and tinnitus with BP ranging 108/50 - 125/53 and the amlodipine was sequentially reduced, then stopped       She states she noticed an increased ringing in her ears even just on metoprolol but it was much worse with amlodipine  She has not been having chest pain  Cardiac Testing:      Echo stress from 2/22/2023; patient exercised for 6 minutes 1 second, 169 bpm, 115% MPHR, 7 0 METS limited by chest pain  There are horizontal, 1 mm ST depressions in the inferior leads II, III, aVF, precordial ST segment changes do not meet diagnostic criteria for ischemia  EKG is overall consistent with ischemia  The baseline rest echocardiogram shows normal ejection fraction 55 to 60% with no regional wall motion abnormalities; at peak stress there is akinesis of the basal to mid inferior wall as well as a failure of the LV to properly augment  Coronary angiogram 3/13/23  Impression       •  No angiographic evidence of obstructive CAD  •  Transient EKG ST/TW changes suggestive of possible vasospasm  •  LVEDP normal without gradient on LV-AO pullback           EKGs, personally reviewed:  n/a    Relevant Labs & Results:        ROS:  Review of Systems:  Review of Systems    Objective:     Vitals:   Vitals:    03/24/23 1042   BP: 162/68   BP Location: Left arm   Patient Position: Sitting   Cuff Size: Standard   Pulse: 72   Weight: 52 6 kg (116 lb)   Height: 5' 1" (1 549 m)    Body surface area is 1 5 meters squared  Wt Readings from Last 3 Encounters:   03/24/23 52 6 kg (116 lb)   03/13/23 50 8 kg (112 lb)   02/27/23 51 4 kg (113 lb 6 4 oz)       Physical Exam:  General: Julissa Bennett is a well appearing female, in no acute distress, sitting comfortably  HEENT: moist mucous membranes, EOMI  Neck:  No JVD, supple, trachea midline  Cardiovascular: unremarkable S1/S2, regular rate and rhythm, no murmurs, rubs or gallops  Pulmonary: normal respiratory effort, CTAB  Abdomen: soft and nondistended  Extremities: No lower extremity edema  Warm and well perfused extremities    Neuro: no focal motor deficits, AAOx3 (person, place, time)  Psych: Normal mood and affect, cooperative      Medications (at the START of this encounter): Outpatient Medications Prior to Visit   Medication Sig Dispense Refill   • albuterol (PROVENTIL HFA,VENTOLIN HFA) 90 mcg/act inhaler Inhale 2 puffs every 4 (four) hours as needed for shortness of breath 18 g 5   • aspirin (ECOTRIN LOW STRENGTH) 81 mg EC tablet Take 1 tablet (81 mg total) by mouth daily 30 tablet 3   • Breo Ellipta 200-25 MCG/ACT inhaler INHALE 1 PUFF DAILY RINSE MOUTH AFTER USE  60 each 3   • Cholecalciferol 50 MCG (2000 UT) CAPS Take 1 tablet by mouth daily     • conjugated estrogens (PREMARIN) vaginal cream Insert 1 g into the vagina once a week     • fluticasone (VERAMYST) 27 5 MCG/SPRAY nasal spray 1 spray into each nostril daily     • levothyroxine 50 mcg tablet Take 50 mcg by mouth daily in the early morning     • meclizine (ANTIVERT) 25 mg tablet TAKE 1 TABLET BY MOUTH EVERY 6 HOURS AS NEEDED FOR NAUSEA (MAY TAKE 30 MINUTES PRIOR TO NYSTATIN)     • metoprolol tartrate (LOPRESSOR) 25 mg tablet Take 1 tablet (25 mg total) by mouth every 12 (twelve) hours 60 tablet 3   • metroNIDAZOLE (METROGEL) 1 % gel Apply 1 application topically daily     • Multiple Vitamins-Minerals (PRESERVISION AREDS 2 PO) Take by mouth     • nitroglycerin (NITROSTAT) 0 4 mg SL tablet Place 1 tablet (0 4 mg total) under the tongue every 5 (five) minutes as needed for chest pain 30 tablet 0   • ondansetron (ZOFRAN) 4 mg tablet TAKE 1 TABLET ORALLY THREE TIMES A DAY PRIOR TO NYSTATIN FOR 5 DAYS     • Peak Flow Meter NANDA 1 Act by Does not apply route 2 (two) times a day     • polyethylene glycol-propylene glycol (SYSTANE) 0 4-0 3 % Apply 1 drop to eye Three times a day     • rosuvastatin (CRESTOR) 20 MG tablet Take 1 tablet (20 mg total) by mouth daily 30 tablet 3   • sucralfate (CARAFATE) 1 g tablet Take 1 g by mouth in the morning and 1 g in the evening       • amLODIPine (NORVASC) 2 5 mg tablet Take 1 tablet (2 5 mg total) by mouth daily 90 tablet 3 No facility-administered medications prior to visit  Time Spent:  Total time (face-to-face and non-face-to-face) spent on today's visit was 20 minutes  This includes preparation for the visits (i e  reviewing test results), performance of a medically appropriate history and examination, and orders for medications, tests or other procedures  This time is exclusive of procedures performed and time spent teaching  This note was completed in part utilizing [email or web address] voice recognition software  Grammatical errors, random word insertion, spelling mistakes, occasional wrong word or "sound-alike" substitutions and incomplete sentences may be an occasional consequence of the system secondary to software limitations, ambient noise and hardware issues  At the time of dictation, efforts were made to edit, clarify and /or correct errors  Please read the chart carefully and recognize, using context, where substitutions have occurred  If you have any questions or concerns about the context, text or information contained within the body of this dictation, please contact myself, the provider, for further clarification

## 2023-03-30 ENCOUNTER — APPOINTMENT (OUTPATIENT)
Dept: PHYSICAL THERAPY | Facility: CLINIC | Age: 73
End: 2023-03-30

## 2023-03-30 DIAGNOSIS — J45.30 MILD PERSISTENT ASTHMA WITHOUT COMPLICATION: ICD-10-CM

## 2023-03-30 RX ORDER — FLUTICASONE FUROATE AND VILANTEROL 200; 25 UG/1; UG/1
1 POWDER RESPIRATORY (INHALATION) DAILY
Qty: 60 EACH | Refills: 3 | Status: SHIPPED | OUTPATIENT
Start: 2023-03-30

## 2023-03-30 NOTE — TELEPHONE ENCOUNTER
Breo Ellipta 200-25 MCG/ACT inhaler/ Patient left a voice message requesting a refill on her Breo    Patient las seen 01/17/23

## 2023-04-04 ENCOUNTER — APPOINTMENT (OUTPATIENT)
Dept: PHYSICAL THERAPY | Facility: CLINIC | Age: 73
End: 2023-04-04

## 2023-04-11 ENCOUNTER — APPOINTMENT (OUTPATIENT)
Dept: PHYSICAL THERAPY | Facility: CLINIC | Age: 73
End: 2023-04-11

## 2023-04-13 ENCOUNTER — APPOINTMENT (OUTPATIENT)
Dept: PHYSICAL THERAPY | Facility: CLINIC | Age: 73
End: 2023-04-13

## 2023-04-27 ENCOUNTER — OFFICE VISIT (OUTPATIENT)
Dept: PULMONOLOGY | Facility: CLINIC | Age: 73
End: 2023-04-27

## 2023-04-27 VITALS
OXYGEN SATURATION: 98 % | DIASTOLIC BLOOD PRESSURE: 64 MMHG | TEMPERATURE: 97.6 F | HEART RATE: 64 BPM | SYSTOLIC BLOOD PRESSURE: 120 MMHG

## 2023-04-27 DIAGNOSIS — J45.50 SEVERE PERSISTENT ASTHMA WITHOUT COMPLICATION: Primary | ICD-10-CM

## 2023-04-27 NOTE — ASSESSMENT & PLAN NOTE
It seems since discovering Lupe had cardiac syndrome X, and subsequently treatment of such, her symptoms of dyspnea have improved greatly  She is not using her rescue inhaler as much down from 17 times in March to 8 times in April  She will maintain off Spiriva and continue Breo once daily and the metoprolol twice a day for her cardiac syndrome X

## 2023-04-27 NOTE — PROGRESS NOTES
Assessment/Plan:    Severe persistent asthma without complication  It seems since hawk Andrade had cardiac syndrome X, and subsequently treatment of such, her symptoms of dyspnea have improved greatly  She is not using her rescue inhaler as much down from 17 times in March to 8 times in April  She will maintain off Spiriva and continue Breo once daily and the metoprolol twice a day for her cardiac syndrome X        Diagnoses and all orders for this visit:    Severe persistent asthma without complication          Subjective:      Patient ID: Dora Salter is a 74 y o  female  Mark Farrmitz is doing fine for her to her breathing  She is taking Breo once a day and uses her rescue inhaler 2-3 times a week  She has had a lot of work-up and treatment for cardiac syndrome X and has been treated with metoprolol with good results  primary symptoms  Pertinent negatives include no chest pain, fever, headaches, myalgias or sore throat  The following portions of the patient's history were reviewed and updated as appropriate: allergies, current medications, past family history, past medical history, past social history, past surgical history and problem list     Review of Systems   Constitutional: Negative for appetite change and fever  HENT: Positive for postnasal drip  Negative for ear pain, rhinorrhea, sneezing, sore throat and trouble swallowing  Respiratory: Positive for shortness of breath  Cardiovascular: Negative for chest pain  Musculoskeletal: Negative for myalgias  Neurological: Negative for headaches  Objective:      /64 (BP Location: Left arm, Patient Position: Sitting, Cuff Size: Standard)   Pulse 64   Temp 97 6 °F (36 4 °C) (Tympanic)   SpO2 98%          Physical Exam  Constitutional:       Appearance: She is well-developed  HENT:      Head: Normocephalic  Eyes:      Pupils: Pupils are equal, round, and reactive to light     Cardiovascular:      Rate and Rhythm: Normal rate  Heart sounds: No murmur heard  Pulmonary:      Effort: Pulmonary effort is normal  No respiratory distress  Breath sounds: Normal breath sounds  No wheezing or rales  Abdominal:      Palpations: Abdomen is soft  Musculoskeletal:         General: Normal range of motion  Cervical back: Normal range of motion and neck supple  Skin:     General: Skin is warm and dry  Neurological:      Mental Status: She is alert and oriented to person, place, and time           Answers for HPI/ROS submitted by the patient on 4/26/2023  Do you experience frequent throat clearing?: Yes  Do you have a hoarse voice?: Yes  When did you first notice your symptoms?: in the past 7 days  How often do your symptoms occur?: intermittently  Since you first noticed this problem, how has it changed?: waxing and waning  Do you have shortness of breath that occurs with effort or exertion?: Yes  Do you have ear congestion?: No  Do you have heartburn?: No  Do you have fatigue?: Yes  Do you have nasal congestion?: Yes  Do you have shortness of breath when lying flat?: No  Do you have shortness of breath when you wake up?: No  Do you have sweats?: No  Have you experienced weight loss?: No  Which of the following makes your symptoms worse?: any activity, change in weather, exercise, exposure to fumes, exposure to smoke, strenuous activity  Which of the following makes your symptoms better?: rest

## 2023-06-09 DIAGNOSIS — I20.8 CARDIAC SYNDROME X (HCC): ICD-10-CM

## 2023-06-09 RX ORDER — ROSUVASTATIN CALCIUM 20 MG/1
20 TABLET, COATED ORAL DAILY
Qty: 90 TABLET | Refills: 1 | Status: SHIPPED | OUTPATIENT
Start: 2023-06-09

## 2023-06-10 ENCOUNTER — NURSE TRIAGE (OUTPATIENT)
Dept: OTHER | Facility: OTHER | Age: 73
End: 2023-06-10

## 2023-06-10 DIAGNOSIS — J45.31 MILD PERSISTENT ASTHMA WITH ACUTE EXACERBATION: Primary | ICD-10-CM

## 2023-06-10 RX ORDER — PREDNISONE 20 MG/1
40 TABLET ORAL DAILY
Qty: 10 TABLET | Refills: 0 | Status: SHIPPED | OUTPATIENT
Start: 2023-06-10 | End: 2023-06-15

## 2023-06-10 NOTE — TELEPHONE ENCOUNTER
"  Answer Assessment - Initial Assessment Questions  1  RESPIRATORY STATUS: \"Describe your breathing? \" (e g , wheezing, shortness of breath, unable to speak, severe coughing)       No wheezing  2  ONSET: \"When did this asthma attack begin? \"       In the last couple of days since out door smoke    3  TRIGGER: \"What do you think triggered this attack? \" (e g , URI, exposure to pollen or other allergen, tobacco smoke)       Outdoor smoke- environmental    4  PEAK EXPIRATORY FLOW RATE (PEFR): \"Do you use a peak flow meter? \" If Yes, ask: \"What's the current peak flow? What's your personal best peak flow? \"       340    5  SEVERITY: \"How bad is this attack? \"     - MILD: No SOB at rest, mild SOB with walking, speaks normally in sentences, can lay down, no retractions, pulse < 100  (GREEN Zone: PEFR %)    - MODERATE: SOB at rest, SOB with minimal exertion and prefers to sit, cannot lie down flat, speaks in phrases, mild retractions, audible wheezing, pulse 100-120  (YELLOW Zone: PEFR 50-80%)     - SEVERE: Very SOB at rest, speaks in single words, struggling to breathe, sitting hunched forward, retractions, usually loud wheezing, sometimes minimal wheezing because of decreased air movement, pulse > 120  (RED Zone: PEFR < 50%)  Moderate- when sitting and when walking  6  MEDICATIONS (Inhaler or nebs): \"What are your asthma medications? \" and \"What treatments have you given so far? \"     - Quick-relief: albuterol, metaproterenol, salbutamol, or other inhaled or nebulized beta-agonist medicines    - Long-term-control: steroids, cromolyn, or other anti-inflammatory medicines  Breo Ellipta, albuterol 2 puffs- last taken at 0840    7  OTHER SYMPTOMS: \"Do you have any other symptoms? (e g , runny nose, chest pain, fever)      Exertion makes it worse  Pulse ox is 98-99%    Protocols used: ASTHMA ATTACK-ADULT-AH      Discussed with on call Pulmonologist- ok to send in prednisone 40 mg PO once daily for 5 days   Pt " is aware

## 2023-06-10 NOTE — TELEPHONE ENCOUNTER
Regarding: SOB and inhaler not helping  ----- Message from [employer] sent at 6/10/2023 10:19 AM EDT -----  I am having some SOB and I took inhaler and its not helping  It wasn't good yesterday and getting worse today

## 2023-06-15 ENCOUNTER — TELEPHONE (OUTPATIENT)
Dept: CARDIOLOGY CLINIC | Facility: CLINIC | Age: 73
End: 2023-06-15

## 2023-06-15 NOTE — TELEPHONE ENCOUNTER
Pt has just completed her 5th day of 40 mg of prednisone,   BP's this morning before and immediately after taking Lopressor 25 mg this monring  174/58, 62  162/94  168/104    A few hours later it has improved to 160's/70  Advised they should gradually improve since today is the last day of steroids  She asked if she should take an extra Lopressor?  I was unsure since HR is already 62 and she takes 25 mg BID

## 2023-07-07 ENCOUNTER — HOSPITAL ENCOUNTER (INPATIENT)
Facility: HOSPITAL | Age: 73
LOS: 2 days | Discharge: HOME | DRG: 312 | End: 2023-07-10
Attending: EMERGENCY MEDICINE | Admitting: HOSPITALIST
Payer: COMMERCIAL

## 2023-07-07 ENCOUNTER — APPOINTMENT (EMERGENCY)
Dept: RADIOLOGY | Facility: HOSPITAL | Age: 73
DRG: 312 | End: 2023-07-07
Payer: COMMERCIAL

## 2023-07-07 DIAGNOSIS — R11.0 NAUSEA: ICD-10-CM

## 2023-07-07 DIAGNOSIS — R55 SYNCOPE: Primary | ICD-10-CM

## 2023-07-07 DIAGNOSIS — H93.13 TINNITUS OF BOTH EARS: ICD-10-CM

## 2023-07-07 LAB
ALBUMIN SERPL BCP-MCNC: 3.8 G/DL (ref 3.5–5)
ALP SERPL-CCNC: 52 U/L (ref 34–104)
ALT SERPL W P-5'-P-CCNC: 13 U/L (ref 7–52)
ANION GAP SERPL CALCULATED.3IONS-SCNC: 6 MMOL/L
AST SERPL W P-5'-P-CCNC: 18 U/L (ref 13–39)
BACTERIA UR QL AUTO: NORMAL /HPF
BASOPHILS # BLD AUTO: 0.05 THOUSANDS/ÂΜL (ref 0–0.1)
BASOPHILS NFR BLD AUTO: 1 % (ref 0–1)
BILIRUB SERPL-MCNC: 0.66 MG/DL (ref 0.2–1)
BILIRUB UR QL STRIP: NEGATIVE
BNP SERPL-MCNC: 449 PG/ML (ref 0–100)
BUN SERPL-MCNC: 11 MG/DL (ref 5–25)
CALCIUM SERPL-MCNC: 8.5 MG/DL (ref 8.4–10.2)
CARDIAC TROPONIN I PNL SERPL HS: 6 NG/L
CHLORIDE SERPL-SCNC: 97 MMOL/L (ref 96–108)
CLARITY UR: CLEAR
CO2 SERPL-SCNC: 26 MMOL/L (ref 21–32)
COLOR UR: YELLOW
CREAT SERPL-MCNC: 0.66 MG/DL (ref 0.6–1.3)
EOSINOPHIL # BLD AUTO: 0.05 THOUSAND/ÂΜL (ref 0–0.61)
EOSINOPHIL NFR BLD AUTO: 1 % (ref 0–6)
ERYTHROCYTE [DISTWIDTH] IN BLOOD BY AUTOMATED COUNT: 11.9 % (ref 11.6–15.1)
GFR SERPL CREATININE-BSD FRML MDRD: 86 ML/MIN/1.73SQ M
GLUCOSE SERPL-MCNC: 116 MG/DL (ref 65–140)
GLUCOSE UR STRIP-MCNC: NEGATIVE MG/DL
HCT VFR BLD AUTO: 36.2 % (ref 34.8–46.1)
HGB BLD-MCNC: 12.1 G/DL (ref 11.5–15.4)
HGB UR QL STRIP.AUTO: ABNORMAL
IMM GRANULOCYTES # BLD AUTO: 0.07 THOUSAND/UL (ref 0–0.2)
IMM GRANULOCYTES NFR BLD AUTO: 1 % (ref 0–2)
KETONES UR STRIP-MCNC: NEGATIVE MG/DL
LEUKOCYTE ESTERASE UR QL STRIP: ABNORMAL
LYMPHOCYTES # BLD AUTO: 1.63 THOUSANDS/ÂΜL (ref 0.6–4.47)
LYMPHOCYTES NFR BLD AUTO: 19 % (ref 14–44)
MCH RBC QN AUTO: 29.4 PG (ref 26.8–34.3)
MCHC RBC AUTO-ENTMCNC: 33.4 G/DL (ref 31.4–37.4)
MCV RBC AUTO: 88 FL (ref 82–98)
MONOCYTES # BLD AUTO: 0.83 THOUSAND/ÂΜL (ref 0.17–1.22)
MONOCYTES NFR BLD AUTO: 10 % (ref 4–12)
NEUTROPHILS # BLD AUTO: 6.12 THOUSANDS/ÂΜL (ref 1.85–7.62)
NEUTS SEG NFR BLD AUTO: 68 % (ref 43–75)
NITRITE UR QL STRIP: NEGATIVE
NON-SQ EPI CELLS URNS QL MICRO: NORMAL /HPF
NRBC BLD AUTO-RTO: 0 /100 WBCS
PH UR STRIP.AUTO: 6 [PH]
PLATELET # BLD AUTO: 231 THOUSANDS/UL (ref 149–390)
PMV BLD AUTO: 9.5 FL (ref 8.9–12.7)
POTASSIUM SERPL-SCNC: 3.5 MMOL/L (ref 3.5–5.3)
PROT SERPL-MCNC: 5.9 G/DL (ref 6.4–8.4)
PROT UR STRIP-MCNC: ABNORMAL MG/DL
RBC # BLD AUTO: 4.11 MILLION/UL (ref 3.81–5.12)
RBC #/AREA URNS AUTO: NORMAL /HPF
SODIUM SERPL-SCNC: 129 MMOL/L (ref 135–147)
SP GR UR STRIP.AUTO: 1.01 (ref 1–1.03)
TSH SERPL DL<=0.05 MIU/L-ACNC: 6.81 UIU/ML (ref 0.45–4.5)
UROBILINOGEN UR STRIP-ACNC: <2 MG/DL
WBC # BLD AUTO: 8.75 THOUSAND/UL (ref 4.31–10.16)
WBC #/AREA URNS AUTO: NORMAL /HPF

## 2023-07-07 PROCEDURE — 85025 COMPLETE CBC W/AUTO DIFF WBC: CPT

## 2023-07-07 PROCEDURE — 83880 ASSAY OF NATRIURETIC PEPTIDE: CPT

## 2023-07-07 PROCEDURE — 99285 EMERGENCY DEPT VISIT HI MDM: CPT

## 2023-07-07 PROCEDURE — 36415 COLL VENOUS BLD VENIPUNCTURE: CPT

## 2023-07-07 PROCEDURE — 80053 COMPREHEN METABOLIC PANEL: CPT

## 2023-07-07 PROCEDURE — 96361 HYDRATE IV INFUSION ADD-ON: CPT

## 2023-07-07 PROCEDURE — 84484 ASSAY OF TROPONIN QUANT: CPT

## 2023-07-07 PROCEDURE — 96360 HYDRATION IV INFUSION INIT: CPT

## 2023-07-07 PROCEDURE — 81001 URINALYSIS AUTO W/SCOPE: CPT

## 2023-07-07 PROCEDURE — 84443 ASSAY THYROID STIM HORMONE: CPT

## 2023-07-07 PROCEDURE — 93005 ELECTROCARDIOGRAM TRACING: CPT

## 2023-07-07 PROCEDURE — 84439 ASSAY OF FREE THYROXINE: CPT

## 2023-07-07 PROCEDURE — 71045 X-RAY EXAM CHEST 1 VIEW: CPT

## 2023-07-07 RX ADMIN — SODIUM CHLORIDE 500 ML: 0.9 INJECTION, SOLUTION INTRAVENOUS at 21:37

## 2023-07-08 ENCOUNTER — APPOINTMENT (INPATIENT)
Dept: CT IMAGING | Facility: HOSPITAL | Age: 73
DRG: 312 | End: 2023-07-08
Payer: COMMERCIAL

## 2023-07-08 ENCOUNTER — APPOINTMENT (EMERGENCY)
Dept: CT IMAGING | Facility: HOSPITAL | Age: 73
DRG: 312 | End: 2023-07-08
Payer: COMMERCIAL

## 2023-07-08 PROBLEM — R42 DIZZINESS: Status: ACTIVE | Noted: 2023-07-08

## 2023-07-08 PROBLEM — R55 SYNCOPE: Status: ACTIVE | Noted: 2023-07-08

## 2023-07-08 PROBLEM — I20.1 VASOSPASTIC ANGINA (HCC): Status: ACTIVE | Noted: 2023-07-08

## 2023-07-08 PROBLEM — R32 URINE INCONTINENCE: Status: ACTIVE | Noted: 2023-07-08

## 2023-07-08 PROBLEM — E87.1 CHRONIC HYPONATREMIA: Status: ACTIVE | Noted: 2023-07-08

## 2023-07-08 PROBLEM — E03.9 HYPOTHYROIDISM (ACQUIRED): Status: ACTIVE | Noted: 2023-07-08

## 2023-07-08 PROBLEM — H93.13 TINNITUS OF BOTH EARS: Status: ACTIVE | Noted: 2023-07-08

## 2023-07-08 LAB
2HR DELTA HS TROPONIN: 2 NG/L
4HR DELTA HS TROPONIN: 0 NG/L
ANION GAP SERPL CALCULATED.3IONS-SCNC: 2 MMOL/L
ATRIAL RATE: 63 BPM
BASOPHILS # BLD AUTO: 0.04 THOUSANDS/ÂΜL (ref 0–0.1)
BASOPHILS NFR BLD AUTO: 1 % (ref 0–1)
BUN SERPL-MCNC: 8 MG/DL (ref 5–25)
CALCIUM SERPL-MCNC: 8 MG/DL (ref 8.4–10.2)
CARDIAC TROPONIN I PNL SERPL HS: 6 NG/L
CARDIAC TROPONIN I PNL SERPL HS: 8 NG/L
CHLORIDE SERPL-SCNC: 108 MMOL/L (ref 96–108)
CO2 SERPL-SCNC: 27 MMOL/L (ref 21–32)
CREAT SERPL-MCNC: 0.56 MG/DL (ref 0.6–1.3)
EOSINOPHIL # BLD AUTO: 0.05 THOUSAND/ÂΜL (ref 0–0.61)
EOSINOPHIL NFR BLD AUTO: 1 % (ref 0–6)
ERYTHROCYTE [DISTWIDTH] IN BLOOD BY AUTOMATED COUNT: 12.1 % (ref 11.6–15.1)
GFR SERPL CREATININE-BSD FRML MDRD: 91 ML/MIN/1.73SQ M
GLUCOSE SERPL-MCNC: 98 MG/DL (ref 65–140)
HCT VFR BLD AUTO: 33.6 % (ref 34.8–46.1)
HGB BLD-MCNC: 11.2 G/DL (ref 11.5–15.4)
IMM GRANULOCYTES # BLD AUTO: 0.05 THOUSAND/UL (ref 0–0.2)
IMM GRANULOCYTES NFR BLD AUTO: 1 % (ref 0–2)
LYMPHOCYTES # BLD AUTO: 1.44 THOUSANDS/ÂΜL (ref 0.6–4.47)
LYMPHOCYTES NFR BLD AUTO: 19 % (ref 14–44)
MCH RBC QN AUTO: 29.8 PG (ref 26.8–34.3)
MCHC RBC AUTO-ENTMCNC: 33.3 G/DL (ref 31.4–37.4)
MCV RBC AUTO: 89 FL (ref 82–98)
MONOCYTES # BLD AUTO: 0.73 THOUSAND/ÂΜL (ref 0.17–1.22)
MONOCYTES NFR BLD AUTO: 10 % (ref 4–12)
NEUTROPHILS # BLD AUTO: 5.12 THOUSANDS/ÂΜL (ref 1.85–7.62)
NEUTS SEG NFR BLD AUTO: 68 % (ref 43–75)
NRBC BLD AUTO-RTO: 0 /100 WBCS
P AXIS: 62 DEGREES
PLATELET # BLD AUTO: 190 THOUSANDS/UL (ref 149–390)
PMV BLD AUTO: 8.8 FL (ref 8.9–12.7)
POTASSIUM SERPL-SCNC: 3.7 MMOL/L (ref 3.5–5.3)
PR INTERVAL: 158 MS
QRS AXIS: 89 DEGREES
QRSD INTERVAL: 86 MS
QT INTERVAL: 410 MS
QTC INTERVAL: 419 MS
RBC # BLD AUTO: 3.76 MILLION/UL (ref 3.81–5.12)
SODIUM SERPL-SCNC: 137 MMOL/L (ref 135–147)
T WAVE AXIS: 68 DEGREES
T4 FREE SERPL-MCNC: 1.09 NG/DL (ref 0.61–1.12)
VENTRICULAR RATE: 63 BPM
WBC # BLD AUTO: 7.43 THOUSAND/UL (ref 4.31–10.16)

## 2023-07-08 PROCEDURE — 84484 ASSAY OF TROPONIN QUANT: CPT

## 2023-07-08 PROCEDURE — 70498 CT ANGIOGRAPHY NECK: CPT

## 2023-07-08 PROCEDURE — 85025 COMPLETE CBC W/AUTO DIFF WBC: CPT

## 2023-07-08 PROCEDURE — 36415 COLL VENOUS BLD VENIPUNCTURE: CPT

## 2023-07-08 PROCEDURE — 70496 CT ANGIOGRAPHY HEAD: CPT

## 2023-07-08 PROCEDURE — 93010 ELECTROCARDIOGRAM REPORT: CPT | Performed by: INTERNAL MEDICINE

## 2023-07-08 PROCEDURE — 99223 1ST HOSP IP/OBS HIGH 75: CPT | Performed by: INTERNAL MEDICINE

## 2023-07-08 PROCEDURE — 80048 BASIC METABOLIC PNL TOTAL CA: CPT

## 2023-07-08 PROCEDURE — G1004 CDSM NDSC: HCPCS

## 2023-07-08 RX ORDER — LEVOTHYROXINE SODIUM 0.05 MG/1
50 TABLET ORAL
Status: DISCONTINUED | OUTPATIENT
Start: 2023-07-08 | End: 2023-07-08

## 2023-07-08 RX ORDER — SODIUM CHLORIDE, SODIUM GLUCONATE, SODIUM ACETATE, POTASSIUM CHLORIDE, MAGNESIUM CHLORIDE, SODIUM PHOSPHATE, DIBASIC, AND POTASSIUM PHOSPHATE .53; .5; .37; .037; .03; .012; .00082 G/100ML; G/100ML; G/100ML; G/100ML; G/100ML; G/100ML; G/100ML
500 INJECTION, SOLUTION INTRAVENOUS ONCE
Status: COMPLETED | OUTPATIENT
Start: 2023-07-08 | End: 2023-07-08

## 2023-07-08 RX ORDER — METRONIDAZOLE 10 MG/G
1 GEL TOPICAL DAILY
Status: DISCONTINUED | OUTPATIENT
Start: 2023-07-08 | End: 2023-07-10 | Stop reason: ALTCHOICE

## 2023-07-08 RX ORDER — ONDANSETRON 2 MG/ML
4 INJECTION INTRAMUSCULAR; INTRAVENOUS EVERY 4 HOURS PRN
Status: DISCONTINUED | OUTPATIENT
Start: 2023-07-08 | End: 2023-07-10 | Stop reason: HOSPADM

## 2023-07-08 RX ORDER — FLUTICASONE PROPIONATE 50 MCG
1 SPRAY, SUSPENSION (ML) NASAL
Status: DISCONTINUED | OUTPATIENT
Start: 2023-07-08 | End: 2023-07-09

## 2023-07-08 RX ORDER — NITROGLYCERIN 0.4 MG/1
0.4 TABLET SUBLINGUAL
Status: DISCONTINUED | OUTPATIENT
Start: 2023-07-08 | End: 2023-07-10 | Stop reason: HOSPADM

## 2023-07-08 RX ORDER — SODIUM CHLORIDE, SODIUM GLUCONATE, SODIUM ACETATE, POTASSIUM CHLORIDE, MAGNESIUM CHLORIDE, SODIUM PHOSPHATE, DIBASIC, AND POTASSIUM PHOSPHATE .53; .5; .37; .037; .03; .012; .00082 G/100ML; G/100ML; G/100ML; G/100ML; G/100ML; G/100ML; G/100ML
500 INJECTION, SOLUTION INTRAVENOUS ONCE
Status: DISCONTINUED | OUTPATIENT
Start: 2023-07-08 | End: 2023-07-08

## 2023-07-08 RX ORDER — LEVOTHYROXINE SODIUM 0.05 MG/1
50 TABLET ORAL
Status: DISCONTINUED | OUTPATIENT
Start: 2023-07-09 | End: 2023-07-10 | Stop reason: HOSPADM

## 2023-07-08 RX ORDER — FLUTICASONE FUROATE AND VILANTEROL 200; 25 UG/1; UG/1
1 POWDER RESPIRATORY (INHALATION) DAILY
Status: DISCONTINUED | OUTPATIENT
Start: 2023-07-08 | End: 2023-07-10 | Stop reason: HOSPADM

## 2023-07-08 RX ORDER — LEVOTHYROXINE SODIUM 0.07 MG/1
75 TABLET ORAL
Status: DISCONTINUED | OUTPATIENT
Start: 2023-07-08 | End: 2023-07-08

## 2023-07-08 RX ORDER — ATORVASTATIN CALCIUM 40 MG/1
40 TABLET, FILM COATED ORAL
Status: DISCONTINUED | OUTPATIENT
Start: 2023-07-08 | End: 2023-07-10 | Stop reason: HOSPADM

## 2023-07-08 RX ORDER — SUCRALFATE 1 G/1
1 TABLET ORAL 2 TIMES DAILY
Status: DISCONTINUED | OUTPATIENT
Start: 2023-07-08 | End: 2023-07-09

## 2023-07-08 RX ORDER — ALBUTEROL SULFATE 90 UG/1
2 AEROSOL, METERED RESPIRATORY (INHALATION) EVERY 4 HOURS PRN
Status: DISCONTINUED | OUTPATIENT
Start: 2023-07-08 | End: 2023-07-10 | Stop reason: HOSPADM

## 2023-07-08 RX ADMIN — METOPROLOL TARTRATE 25 MG: 25 TABLET, FILM COATED ORAL at 08:51

## 2023-07-08 RX ADMIN — SODIUM CHLORIDE, SODIUM GLUCONATE, SODIUM ACETATE, POTASSIUM CHLORIDE, MAGNESIUM CHLORIDE, SODIUM PHOSPHATE, DIBASIC, AND POTASSIUM PHOSPHATE 500 ML: .53; .5; .37; .037; .03; .012; .00082 INJECTION, SOLUTION INTRAVENOUS at 01:05

## 2023-07-08 RX ADMIN — METRONIDAZOLE 1 APPLICATION.: 10 GEL TOPICAL at 08:28

## 2023-07-08 RX ADMIN — FLUTICASONE FUROATE AND VILANTEROL TRIFENATATE 1 PUFF: 200; 25 POWDER RESPIRATORY (INHALATION) at 09:12

## 2023-07-08 RX ADMIN — ONDANSETRON 4 MG: 2 INJECTION INTRAMUSCULAR; INTRAVENOUS at 08:48

## 2023-07-08 RX ADMIN — METOPROLOL TARTRATE 25 MG: 25 TABLET, FILM COATED ORAL at 20:34

## 2023-07-08 RX ADMIN — SUCRALFATE 1 G: 1 TABLET ORAL at 16:04

## 2023-07-08 RX ADMIN — FLUTICASONE PROPIONATE 1 SPRAY: 50 SPRAY, METERED NASAL at 08:21

## 2023-07-08 RX ADMIN — LEVOTHYROXINE SODIUM 75 MCG: 25 TABLET ORAL at 05:36

## 2023-07-08 RX ADMIN — ATORVASTATIN CALCIUM 40 MG: 40 TABLET, FILM COATED ORAL at 16:05

## 2023-07-08 RX ADMIN — IOHEXOL 85 ML: 350 INJECTION, SOLUTION INTRAVENOUS at 00:27

## 2023-07-08 NOTE — ASSESSMENT & PLAN NOTE
History of chronic tinnitus. Denies any pulsatile tinnitus. Tinnitus gets worse prior to syncope and loss of consciousness. -- CTA did not demonstrate any acute vascular concerns that would cause VBI or syncope.

## 2023-07-08 NOTE — ASSESSMENT & PLAN NOTE
Acquired hypothyroidism with resection of partial thyroid. On levothyroxine 50 mcg daily.   TSH elevated to 6  -- Plan to check free T4 level  --Increase levothyroxine by 25 mcg to 75 mcg daily  -- Patient follows outpatient endocrinology, continue following with them

## 2023-07-08 NOTE — PLAN OF CARE
Problem: PAIN - ADULT  Goal: Verbalizes/displays adequate comfort level or baseline comfort level  Description: Interventions:  - Encourage patient to monitor pain and request assistance  - Assess pain using appropriate pain scale  - Administer analgesics based on type and severity of pain and evaluate response  - Implement non-pharmacological measures as appropriate and evaluate response  - Consider cultural and social influences on pain and pain management  - Notify physician/advanced practitioner if interventions unsuccessful or patient reports new pain  Outcome: Progressing     Problem: INFECTION - ADULT  Goal: Absence or prevention of progression during hospitalization  Description: INTERVENTIONS:  - Assess and monitor for signs and symptoms of infection  - Monitor lab/diagnostic results  - Monitor all insertion sites, i.e. indwelling lines, tubes, and drains  - Monitor endotracheal if appropriate and nasal secretions for changes in amount and color  - [place] appropriate cooling/warming therapies per order  - Administer medications as ordered  - Instruct and encourage patient and family to use good hand hygiene technique  - Identify and instruct in appropriate isolation precautions for identified infection/condition  Outcome: Progressing  Goal: Absence of fever/infection during neutropenic period  Description: INTERVENTIONS:  - Monitor WBC    Outcome: Progressing     Problem: SAFETY ADULT  Goal: Patient will remain free of falls  Description: INTERVENTIONS:  - Educate patient/family on patient safety including physical limitations  - Instruct patient to call for assistance with activity   - Consult OT/PT to assist with strengthening/mobility   - Keep Call bell within reach  - Keep bed low and locked with side rails adjusted as appropriate  - Keep care items and personal belongings within reach  - Initiate and maintain comfort rounds  - Make Fall Risk Sign visible to staff  - Offer Toileting every 2 Hours, in advance of need  - Initiate/Maintain bed alarm  - Obtain necessary fall risk management equipment:   - Apply yellow socks and bracelet for high fall risk patients  - Consider moving patient to room near nurses station  Outcome: Progressing  Goal: Maintain or return to baseline ADL function  Description: INTERVENTIONS:  -  Assess patient's ability to carry out ADLs; assess patient's baseline for ADL function and identify physical deficits which impact ability to perform ADLs (bathing, care of mouth/teeth, toileting, grooming, dressing, etc.)  - Assess/evaluate cause of self-care deficits   - Assess range of motion  - Assess patient's mobility; develop plan if impaired  - Assess patient's need for assistive devices and provide as appropriate  - Encourage maximum independence but intervene and supervise when necessary  - Involve family in performance of ADLs  - Assess for home care needs following discharge   - Consider OT consult to assist with ADL evaluation and planning for discharge  - Provide patient education as appropriate  Outcome: Progressing  Goal: Maintains/Returns to pre admission functional level  Description: INTERVENTIONS:  - Perform BMAT or MOVE assessment daily.   - Set and communicate daily mobility goal to care team and patient/family/caregiver. - Collaborate with rehabilitation services on mobility goals if consulted  - Perform Range of Motion 3 times a day. - Reposition patient every 3 hours.   - Dangle patient 3 times a day  - Stand patient 3 times a day  - Ambulate patient 3 times a day  - Out of bed to chair 3 times a day   - Out of bed for meals 3 times a day  - Out of bed for toileting  - Record patient progress and toleration of activity level   Outcome: Progressing     Problem: DISCHARGE PLANNING  Goal: Discharge to home or other facility with appropriate resources  Description: INTERVENTIONS:  - Identify barriers to discharge w/patient and caregiver  - Arrange for needed discharge resources and transportation as appropriate  - Identify discharge learning needs (meds, wound care, etc.)  - Arrange for interpretive services to assist at discharge as needed  - Refer to Case Management Department for coordinating discharge planning if the patient needs post-hospital services based on physician/advanced practitioner order or complex needs related to functional status, cognitive ability, or social support system  Outcome: Progressing     Problem: Knowledge Deficit  Goal: Patient/family/caregiver demonstrates understanding of disease process, treatment plan, medications, and discharge instructions  Description: Complete learning assessment and assess knowledge base.   Interventions:  - Provide teaching at level of understanding  - Provide teaching via preferred learning methods  Outcome: Progressing

## 2023-07-08 NOTE — ASSESSMENT & PLAN NOTE
Patient has a history of stress incontinence which occurs when she coughs hard and has a small volume of incontinence.   However large incontinence episode with loss of consciousness which saturated her pants and created a puddle.     -Spot EEG rule out seizure

## 2023-07-08 NOTE — ASSESSMENT & PLAN NOTE
Patient had a syncopal episode that started on Thursday morning when she was in bed supine with her head on her pillow talking to a friend. When she passed out. She could feel the onset of blacked out vision and dizziness without the room spinning prior to losing consciousness. On Friday she had another episode of syncope with about 5-second realization that she was going to pass out she lowered herself to the ground and then lost consciousness at which point her  called 911. When patient woke up she told patient's  to cancel the 911 call. Later on Friday patient had another episode of syncope. During one of her syncopal episodes she had major urinary incontinence which caused secondary infection of her pants as well for Puddle nearby. Patient was both supine and sitting and lowered herself to the ground from walking around in the kitchen during each of these syncopal events and did not suddenly get up from sitting to standing position at any given moment. Does have history of vasovagal syncope as a younger woman. She does remember around 1990s having a syncopal episode where in patient was told that she was having jerking repetitive motion at that time. Patient has chronic tinnitus however tinnitus gets worse in the moments prior to loss of consciousness. Non-pulsatile tinnitus -- less suspicious of idiopathic intracranial hypertension, dural AVM, arterial bruit etc.     Vasovagal syncope versus seizure versus vertebrobasilar insufficiency vs seizure versus orthostasis. With slight prodromal dizziness prior to syncope unclear whether it is cardiogenic in nature though patient does have history of coronary vasospasm on left heart cath.      -- TTE echocardiogram, rule out structural heart disease  -- Telemetry monitoring  -- Spot EEG  -- CTA of the head and neck, neg for  vertebrobasilar insufficiency, dural AVM, potential masses  -- Consider CT head vs MRI of the brain & neuro consult if EEG is positive for seizures  -- Monitor fever curve  -- Monitor electrolytes and replete as needed

## 2023-07-08 NOTE — ASSESSMENT & PLAN NOTE
On albuterol as needed and Breo Ellipta at home.   -- Continue albuterol as needed  -- Fluticasone vilanterol daily  -- Continue fluticasone nasal spray

## 2023-07-08 NOTE — ASSESSMENT & PLAN NOTE
History of abnormal stress test with relatively normal left heart cath, concerning for coronary vasospasm.   - Patient on nitroglycerin 0.4 mg sublingual as needed- pt does not use and free of anginal symptoms  -Lopressor 25 mg every 12 hours for antianginal effect  -Prior to admission aspirin 81 mg was discontinued by her cardiologist due to stomach upset (aspirin 81 was for medical management of vasospastic angina versus micro vascular angina)  - On rosuvastatin at home, will continue with atorvastatin 40 mg daily here

## 2023-07-08 NOTE — H&P
INTERNAL MEDICINE RESIDENCY ADMISSION H&P     Name: Ajith Yang   Age & Sex: 74 y.o. female   MRN: [id number]  Unit/Bed#: ED 11   Encounter: [id number]  Primary Care Provider: Italo Robles DO    Code Status: Prior  Admission Status: OBSERVATION  Disposition: Patient requires Med/Surg with Telemetry      ASSESSMENT/PLAN     Active Problems:    Mild persistent asthma    Vasospastic angina (HCC)    Syncope    Chronic hyponatremia    Hypothyroidism (acquired)    Urine incontinence    Tinnitus of both ears      Tinnitus of both ears  Assessment & Plan  History of chronic tinnitus. Denies any pulsatile tinnitus. Tinnitus gets worse prior to syncope and loss of consciousness. -- Obtain CTA to rule out vascular origins of tinnitus, dizziness      Urine incontinence  Assessment & Plan  Patient has a history of stress incontinence which occurs when she coughs hard and has a small volume of incontinence. However large incontinence episode with loss of consciousness which saturated her pants and created a puddle.     -Spot EEG rule out seizure    Hypothyroidism (acquired)  Assessment & Plan  Acquired hypothyroidism with resection of partial thyroid. On levothyroxine 50 mcg daily. TSH elevated to 6  -- Plan to check free T4 level  --Increase levothyroxine by 25 mcg to 75 mcg daily  -- Patient follows outpatient endocrinology, continue following with them    Chronic hyponatremia  Assessment & Plan  Appears to have chronic hyponatremia with baseline of 1 33-1 34 from the past 2 to 3 years. Her last sodium was 139 back in February. Today, sodium of 129. She says that she has had poor p.o. intake and only took some liquid diet today. She takes a very low sodium diet after dietary modification due to coronary vasospasm. Syncope  Assessment & Plan  Patient had a syncopal episode that started on Thursday morning when she was in bed supine with her head on her pillow talking to a friend.   When she passed out.  She could feel the onset of blacked out vision and dizziness without the room spinning prior to losing consciousness. On Friday she had another episode of syncope with about 5-second realization that she was going to pass out she lowered herself to the ground and then lost consciousness at which point her  called 911. When patient woke up she told patient's  to cancel the 911 call. Later on Friday patient had another episode of syncope. During one of her syncopal episodes she had major urinary incontinence which caused secondary infection of her pants as well for Puddle nearby. Patient was both supine and sitting and lowered herself to the ground from walking around in the kitchen during each of these syncopal events and did not suddenly get up from sitting to standing position at any given moment. Does have history of vasovagal syncope as a younger woman. She does remember around 1990s having a syncopal episode where in patient was told that she was having jerking repetitive motion at that time. Patient has chronic tinnitus however tinnitus gets worse in the moments prior to loss of consciousness. Vasovagal syncope versus seizure versus vertebrobasilar insufficiency vs seizure versus orthostasis. With slight prodromal dizziness prior to syncope unclear whether it is cardiogenic in nature though patient does have history of coronary vasospasm on left heart cath.      -- TTE echocardiogram, rule out structural heart disease  -- Telemetry monitoring  -- Spot EEG  -- CTA of the head and neck, rule out vertebrobasilar insufficiency, dural AVM, potential masses  -- Consider CT head vs MRI of the brain & neuro consult if EEG is positive for seizures  -- Monitor fever curve  -- Monitor electrolytes and replete as needed    Vasospastic angina [facility])  Assessment & Plan  History of abnormal stress test with relatively normal left heart cath, concerning for coronary vasospasm.   - Patient on nitroglycerin 0.4 mg sublingual as needed- pt does not use and free of anginal symptoms  -Lopressor 25 mg every 12 hours for antianginal effect  -Prior to admission aspirin 81 mg was discontinued by her cardiologist due to stomach upset (aspirin 81 was for medical management of vasospastic angina versus micro vascular angina)  - On rosuvastatin at home, will continue with atorvastatin 40 mg daily here    Mild persistent asthma  Assessment & Plan  On albuterol as needed and Breo Ellipta at home. -- Continue albuterol as needed  -- Fluticasone vilanterol daily  -- Continue fluticasone nasal spray      VTE Pharmacologic Prophylaxis: Enoxaparin (Lovenox)  VTE Mechanical Prophylaxis: sequential compression device    CHIEF COMPLAINT     Chief Complaint   Patient presents with   • Nausea     Patient complains of nausea that started yesterday with one episode of vomiting. She states that she "blacked out" at least 3 x since yesterday. Denies falling. She does report one episode of incontinence. HISTORY OF PRESENT ILLNESS   Pt is a 29-year-old female with past medical history of acquired hypothyroidism on levothyroxine, mild persistent asthma, hyperlipidemia, GERD, chronic tinnitus, history of stress incontinence, presented with 3 syncopal episodes. Patient had a syncopal episode that started on Thursday morning when she was in bed supine with her head on her pillow talking to a friend. When she syncopized and lost consciousness in supine position. She could feel the onset of blacked out vision and dizziness without the room spinning prior to losing consciousness. On Friday she had another episode of syncope with about 5-second realization that she was going to pass out she lowered herself to the ground and then lost consciousness at which point her  called 911. When patient woke up she told patient's  to cancel the 911 call. Later on Friday patient had another episode of syncope.   During one of her syncopal episodes she had major urinary incontinence which caused secondary infection of her pants as well for Puddle nearby. Patient was both supine and sitting and lowered herself to the ground from walking around in the kitchen during each of these syncopal events and did not suddenly get up from sitting to standing position at any given moment. Patient does endorse bilateral constant tinnitus which gets louder 5 to 10 seconds prior to her syncopal episodes. Denies any tingling sensation, numbness, warm sensation prior to syncope. Believes each syncopal episodes last about 30 seconds. Denies any pulsatile tinnitus. Denies any visual changes or blurry vision. Denies any postictal state after the episodes --however does not recall what happens during her syncope. She does endorse some stress urinary incontinence at baseline where she might have slight incontinence in her underwear a small volume after coughing. Does not have dizziness after micturition. Denies headache, neck pain, chest pain, shortness of breath, abdominal pain, diarrhea, vomiting, fevers, chills, dysuria or phlegm production. Does have history of vasovagal syncope as a younger woman. She does remember around 1990s having a syncopal episode where in patient was told that she was having jerking repetitive motion at that time. Denies use of any recreational drugs, alcohol, cigarettes. Patient endorses level 1 full code--does mention that if she were to enter a vegetative state she would like to withdraw life-saving measures; discussed that she should bring in her advance directives to the hospital to be placed in chart. REVIEW OF SYSTEMS     Review of Systems   Constitutional: Positive for fatigue. Negative for chills and fever. HENT: Negative for ear pain and sore throat. Eyes: Negative for pain and visual disturbance. Respiratory: Negative for cough and shortness of breath.     Cardiovascular: Negative for chest pain and palpitations. Gastrointestinal: Negative for abdominal pain and vomiting. Endocrine: Negative for cold intolerance. Genitourinary: Negative for dysuria and hematuria. Musculoskeletal: Negative for arthralgias and back pain. Skin: Negative for color change and rash. Neurological: Positive for dizziness, syncope and light-headedness. Negative for tremors, seizures, facial asymmetry, weakness, numbness and headaches. All other systems reviewed and are negative. OBJECTIVE     Vitals:    23 2048 23 2100 23 2330 23 0115   BP: 160/73 143/63 126/61 155/71   BP Location: Left arm Left arm Left arm Left arm   Pulse: 71 63 68 69   Resp: 18 21 20 20   Temp: 98.1 °F (36.7 °C)      TempSrc: Oral      SpO2: 95% 97% 97% 97%      Temperature:   Temp (24hrs), Av.1 °F (36.7 °C), Min:98.1 °F (36.7 °C), Max:98.1 °F (36.7 °C)    Temperature: 98.1 °F (36.7 °C)  Intake & Output:  I/O        07 07 07 07    IV Piggyback  500    Total Intake  500    Net  +500              Weights: There is no height or weight on file to calculate BMI. Weight (last 2 days)     None        Physical Exam  Vitals and nursing note reviewed. Constitutional:       General: She is not in acute distress. Appearance: She is well-developed. She is not ill-appearing or toxic-appearing. HENT:      Head: Normocephalic and atraumatic. Mouth/Throat:      Mouth: Mucous membranes are dry. Eyes:      Conjunctiva/sclera: Conjunctivae normal.   Cardiovascular:      Rate and Rhythm: Normal rate and regular rhythm. Heart sounds: No murmur heard. No gallop. Pulmonary:      Effort: Pulmonary effort is normal. No respiratory distress. Breath sounds: Normal breath sounds. No wheezing or rales. Abdominal:      Palpations: Abdomen is soft. Tenderness: There is no abdominal tenderness. Musculoskeletal:         General: No swelling.       Cervical back: Neck supple. Right lower leg: No edema. Left lower leg: No edema. Skin:     General: Skin is warm and dry. Capillary Refill: Capillary refill takes less than 2 seconds. Neurological:      Mental Status: She is alert and oriented to person, place, and time. Psychiatric:         Mood and Affect: Mood normal.       PAST MEDICAL HISTORY     Past Medical History:   Diagnosis Date   • Asthma    • Cataract    • Disease of thyroid gland    • GERD (gastroesophageal reflux disease)    • Hyperlipidemia    • Kidney stone    • Melanoma ([address]) 2022     PAST SURGICAL HISTORY     Past Surgical History:   Procedure Laterality Date   • CARDIAC CATHETERIZATION N/A 3/13/2023    Procedure: Cardiac Coronary Angiogram;  Surgeon: Parminder Zuniga DO;  Location: [facility]; Service: Cardiology   • CARDIAC CATHETERIZATION  3/13/2023    Procedure: Cardiac catheterization;  Surgeon: Parminder Zuniga DO;  Location: [facility]; Service: Cardiology   • CARDIAC CATHETERIZATION Left 3/13/2023    Procedure: Cardiac Left Heart Cath;  Surgeon: Parminder Zuniga DO;  Location: [facility]; Service: Cardiology   • COLONOSCOPY     • EYE SURGERY Bilateral     preventative glaucoma surgery   • ROTATOR CUFF REPAIR Right     6/2022   • THYROID SURGERY       SOCIAL & FAMILY HISTORY     Social History     Substance and Sexual Activity   Alcohol Use Not Currently     Social History     Substance and Sexual Activity   Drug Use Never     Social History     Tobacco Use   Smoking Status Never   • Passive exposure: Past   Smokeless Tobacco Never     Family History   Problem Relation Age of Onset   • Hypertension Father    • Cancer Maternal Aunt         lip cancer     LABORATORY DATA     Labs: I have personally reviewed pertinent reports.     Results from last 7 days   Lab Units 07/07/23  2134   WBC Thousand/uL 8.75   HEMOGLOBIN g/dL 12.1   HEMATOCRIT % 36.2   PLATELETS Thousands/uL 231   NEUTROS PCT % 68   MONOS PCT % 10   EOS PCT % 1      Results from last 7 days   Lab Units 07/07/23  2134   POTASSIUM mmol/L 3.5   CHLORIDE mmol/L 97   CO2 mmol/L 26   BUN mg/dL 11   CREATININE mg/dL 0.66   CALCIUM mg/dL 8.5   ALK PHOS U/L 52   ALT U/L 13   AST U/L 18                          Micro:  No results found for: "BLOODCX", "[place]", "WOUNDCULT", "SPUTUMCULTUR"  IMAGING & DIAGNOSTIC TESTS     Imaging: I have personally reviewed pertinent reports. No results found. EKG, Pathology, and Other Studies: I have personally reviewed pertinent reports. ALLERGIES     Allergies   Allergen Reactions   • Amlodipine Edema     hands   • Amoxicillin Abdominal Pain, GI Intolerance and Nausea Only   • Clotrimazole Vomiting   • Cyclobenzaprine Nausea Only   • Naproxen Nausea Only   • Nystatin Nausea Only   • Other      Very sensitive to strong perfumes, sprays, smoke and exercise etc.  Recently dx with asthma   • Tramadol Nausea Only     MEDICATIONS PRIOR TO ARRIVAL     Prior to Admission medications    Medication Sig Start Date End Date Taking?  Authorizing Provider   albuterol (PROVENTIL HFA,VENTOLIN HFA) 90 mcg/act inhaler Inhale 2 puffs every 4 (four) hours as needed for shortness of breath 7/5/22  Yes ERICH Gu   Cholecalciferol 50 MCG (2000 UT) CAPS Take 1 tablet by mouth daily   Yes Historical Provider, MD   fluticasone (VERAMYST) 27.5 MCG/SPRAY nasal spray 1 spray into each nostril daily 10/19/20  Yes Lynnette Ji,    fluticasone-vilanterol (Breo Ellipta) 200-25 mcg/actuation inhaler Inhale 1 puff daily Rinse mouth after use. 6/29/23  Yes ERICH Manriquez   levothyroxine 50 mcg tablet Take 50 mcg by mouth daily in the early morning 7/31/18  Yes Historical Provider, MD   metoprolol tartrate (LOPRESSOR) 25 mg tablet Take 1 tablet (25 mg total) by mouth every 12 (twelve) hours 3/24/23  Yes Tri Casey MD   metroNIDAZOLE (METROGEL) 1 % gel Apply 1 application topically daily 11/6/08  Yes Historical Provider, MD   Multiple Vitamins-Minerals (PRESERVISION AREDS 2 PO) Take by mouth   Yes Historical Provider, MD   polyethylene glycol-propylene glycol (SYSTANE) 0.4-0.3 % Apply 1 drop to eye Three times a day   Yes Historical Provider, MD   rosuvastatin (CRESTOR) 20 MG tablet Take 1 tablet (20 mg total) by mouth daily 6/9/23  Yes Hamzah Christiansen MD   sucralfate (CARAFATE) 1 g tablet Take 1 g by mouth in the morning and 1 g in the evening.  4/26/22  Yes Historical Provider, MD   aspirin (ECOTRIN LOW STRENGTH) 81 mg EC tablet Take 1 tablet (81 mg total) by mouth daily 3/24/23 7/8/23 Yes Hamzah Christiansen MD   conjugated estrogens (PREMARIN) vaginal cream Insert 1 g into the vagina once a week 11/27/17 3/24/23  Historical Provider, MD   nitroglycerin (NITROSTAT) 0.4 mg SL tablet Place 1 tablet (0.4 mg total) under the tongue every 5 (five) minutes as needed for chest pain  Patient not taking: Reported on 4/27/2023 2/27/23   Hamzah Christiansen MD   ondansetron (ZOFRAN) 4 mg tablet TAKE 1 TABLET ORALLY THREE TIMES A DAY PRIOR TO NYSTATIN FOR 5 DAYS 2/1/23   Historical Provider, MD   Peak Flow Meter NANDA 1 Act by Does not apply route 2 (two) times a day 2/20/18   Historical Provider, MD   meclizine (ANTIVERT) 25 mg tablet TAKE 1 TABLET BY MOUTH EVERY 6 HOURS AS NEEDED FOR NAUSEA (MAY TAKE 30 MINUTES PRIOR TO NYSTATIN) 1/24/23 7/8/23  Historical Provider, MD     MEDICATIONS ADMINISTERED IN LAST 24 HOURS     Medication Administration - last 24 hours from 07/07/2023 0207 to 07/08/2023 0207       Date/Time Order Dose Route Action Action by     07/07/2023 2333 EDT sodium chloride 0.9 % bolus 500 mL 0 mL Intravenous Stopped Carola White RN     07/07/2023 2137 EDT sodium chloride 0.9 % bolus 500 mL 500 mL Intravenous [address] [facility], [address] [place] [address]     07/08/2023 9678 EDT iohexol (OMNIPAQUE) 350 MG/ML injection (SINGLE-DOSE) 85 mL 85 mL Intravenous Given Emily      07/08/2023 0105 EDT multi-electrolyte (PLASMALYTE-A/ISOLYTE-S PH 7.4) IV solution 500 mL 500 mL Intravenous New Bag Geraldine Chen RN        CURRENT MEDICATIONS     Current Facility-Administered Medications   Medication Dose Route Frequency Provider Last Rate   • albuterol  2 puff Inhalation Q4H PRN Fabian Chaudhari MD     • atorvastatin  40 mg Oral Daily With James Cherry MD     • fluticasone  1 spray Each Nare Daily Fabian Chaudhari MD     • fluticasone-vilanterol  1 puff Inhalation Daily Fabian Chaudhari MD     • glycerin-hypromellose-  1 drop Both Eyes Q6H PRN Fabian Chaudhari MD     • levothyroxine  50 mcg Oral Early Morning Fabian Chaudhari MD     • metoprolol tartrate  25 mg Oral Q12H [address] Tameka Zafar MD     • metroNIDAZOLE  1 application. Topical Daily Fabian Chaudhari MD     • multivitamin-minerals  1 tablet Oral Daily Fabian Chaudhari MD     • nitroglycerin  0.4 mg Sublingual Q5 Min PRN Fabian Chaudhari MD     • sucralfate  1 g Oral BID Fabian Chaudhari MD          albuterol, 2 puff, Q4H PRN  glycerin-hypromellose-, 1 drop, Q6H PRN  nitroglycerin, 0.4 mg, Q5 Min PRN        Admission Time  I spent 45 minutes admitting the patient. This involved direct patient contact where I performed a full history and physical, reviewing previous records, and reviewing laboratory and other diagnostic studies. Portions of the record may have been created with voice recognition software. Occasional wrong word or "sound a like" substitutions may have occurred due to the inherent limitations of voice recognition software.   Read the chart carefully and recognize, using context, where substitutions have occurred.    ==  Jose Guadalupe Vázquez  Internal Medicine Residency PGY-3

## 2023-07-08 NOTE — ASSESSMENT & PLAN NOTE
Appears to have chronic hyponatremia with baseline of 1 33-1 34 from the past 2 to 3 years. Her last sodium was 139 back in February. RESOLVED.

## 2023-07-08 NOTE — ED PROVIDER NOTES
History  Chief Complaint   Patient presents with   • Nausea     Patient complains of nausea that started yesterday with one episode of vomiting. She states that she "blacked out" at least 3 x since yesterday. Denies falling. She does report one episode of incontinence. This is a 41-year-old female with a medical history significant for mild persistent asthma, HLD, hypothyroidism, GERD who presents to the ED for evaluation of multiple syncopal episodes x2 days. Patient reports that she was in the kitchen yesterday evening and suddenly became lightheaded and had a sensation of bilateral tinnitus and lowered her self to the ground. She states that approximately 30 seconds later her  found her down on the ground, she denies any postictal state, denies any falls or trauma. Patient states that approximately 2 hours later she was on the phone with one of her friends while seated on the couch and believes she had another syncopal episode that lasted several seconds. Patient also reports that earlier today while sitting on the couch she had yet another syncopal episode that she believes lasted approximately 30 seconds. States that the sensation of lightheadedness and tinnitus came on prior to the episode, she states that when she came to she had sold her self. Denies any postictal state on any of these episodes. Patient states that as a child she would occasionally vagovagal states she is not had a syncopal episode that she is aware of in several years. She states that she did have some mild nausea last night and earlier today but denies any nausea at time of ED evaluation. She denies any headaches, vision changes, neck pain, chest pain, SOB, abdominal pain, vomiting, diarrhea, dysuria. Prior to Admission Medications   Prescriptions Last Dose Informant Patient Reported? Taking?    Cholecalciferol 50 MCG (2000 UT) CAPS  Self Yes No   Sig: Take 1 tablet by mouth daily   Multiple Vitamins-Minerals (PRESERVISION AREDS 2 PO)  Self Yes No   Sig: Take by mouth   Peak Flow Meter NANDA  Self Yes No   Si Act by Does not apply route 2 (two) times a day   albuterol (PROVENTIL HFA,VENTOLIN HFA) 90 mcg/act inhaler  Self No No   Sig: Inhale 2 puffs every 4 (four) hours as needed for shortness of breath   aspirin (ECOTRIN LOW STRENGTH) 81 mg EC tablet  Self No No   Sig: Take 1 tablet (81 mg total) by mouth daily   conjugated estrogens (PREMARIN) vaginal cream  Self Yes No   Sig: Insert 1 g into the vagina once a week   fluticasone (VERAMYST) 27.5 MCG/SPRAY nasal spray  Self No No   Si spray into each nostril daily   fluticasone-vilanterol (Breo Ellipta) 200-25 mcg/actuation inhaler   No No   Sig: Inhale 1 puff daily Rinse mouth after use. levothyroxine 50 mcg tablet  Self Yes No   Sig: Take 50 mcg by mouth daily in the early morning   meclizine (ANTIVERT) 25 mg tablet  Self Yes No   Sig: TAKE 1 TABLET BY MOUTH EVERY 6 HOURS AS NEEDED FOR NAUSEA (MAY TAKE 30 MINUTES PRIOR TO NYSTATIN)   metoprolol tartrate (LOPRESSOR) 25 mg tablet  Self No No   Sig: Take 1 tablet (25 mg total) by mouth every 12 (twelve) hours   metroNIDAZOLE (METROGEL) 1 % gel  Self Yes No   Sig: Apply 1 application topically daily   nitroglycerin (NITROSTAT) 0.4 mg SL tablet  Self No No   Sig: Place 1 tablet (0.4 mg total) under the tongue every 5 (five) minutes as needed for chest pain   Patient not taking: Reported on 2023   ondansetron (ZOFRAN) 4 mg tablet  Self Yes No   Sig: TAKE 1 TABLET ORALLY THREE TIMES A DAY PRIOR TO NYSTATIN FOR 5 DAYS   polyethylene glycol-propylene glycol (SYSTANE) 0.4-0.3 %  Self Yes No   Sig: Apply 1 drop to eye Three times a day   rosuvastatin (CRESTOR) 20 MG tablet   No No   Sig: Take 1 tablet (20 mg total) by mouth daily   sucralfate (CARAFATE) 1 g tablet  Self Yes No   Sig: Take 1 g by mouth in the morning and 1 g in the evening.       Facility-Administered Medications: None       Past Medical History: Diagnosis Date   • Asthma    • Cataract    • Disease of thyroid gland    • GERD (gastroesophageal reflux disease)    • Hyperlipidemia    • Kidney stone    • Melanoma ([address]) 2022       Past Surgical History:   Procedure Laterality Date   • CARDIAC CATHETERIZATION N/A 3/13/2023    Procedure: Cardiac Coronary Angiogram;  Surgeon: Debora Gomez DO;  Location: [facility]; Service: Cardiology   • CARDIAC CATHETERIZATION  3/13/2023    Procedure: Cardiac catheterization;  Surgeon: Debora Gomez DO;  Location: [facility]; Service: Cardiology   • CARDIAC CATHETERIZATION Left 3/13/2023    Procedure: Cardiac Left Heart Cath;  Surgeon: Debora Gomez DO;  Location: [facility]; Service: Cardiology   • COLONOSCOPY     • EYE SURGERY Bilateral     preventative glaucoma surgery   • ROTATOR CUFF REPAIR Right     6/2022   • THYROID SURGERY         Family History   Problem Relation Age of Onset   • Hypertension Father    • Cancer Maternal Aunt         lip cancer     I have reviewed and agree with the history as documented. E-Cigarette/Vaping   • E-Cigarette Use Never User      E-Cigarette/Vaping Substances   • Nicotine No    • THC No    • CBD No    • Flavoring No    • Other No    • Unknown No      Social History     Tobacco Use   • Smoking status: Never     Passive exposure: Past   • Smokeless tobacco: Never   Vaping Use   • Vaping Use: Never used   Substance Use Topics   • Alcohol use: Not Currently   • Drug use: Never       Review of Systems   Constitutional: Negative for chills and fever. HENT: Positive for tinnitus. Negative for rhinorrhea and trouble swallowing. Eyes: Negative for visual disturbance. Respiratory: Negative for cough and shortness of breath. Cardiovascular: Negative for chest pain, palpitations and leg swelling. Gastrointestinal: Positive for nausea. Negative for abdominal pain, diarrhea and vomiting.    Genitourinary: Negative for difficulty urinating and dysuria. Musculoskeletal: Negative for neck pain and neck stiffness. Neurological: Positive for syncope. Negative for weakness and headaches. Physical Exam  Physical Exam  Vitals and nursing note reviewed. Constitutional:       General: She is not in acute distress. Appearance: She is well-developed. HENT:      Head: Normocephalic and atraumatic. Eyes:      Extraocular Movements: Extraocular movements intact. Conjunctiva/sclera: Conjunctivae normal.      Pupils: Pupils are equal, round, and reactive to light. Cardiovascular:      Rate and Rhythm: Normal rate and regular rhythm. Heart sounds: No murmur heard. Pulmonary:      Effort: Pulmonary effort is normal. No respiratory distress. Breath sounds: Normal breath sounds. No wheezing, rhonchi or rales. Abdominal:      Palpations: Abdomen is soft. Tenderness: There is no abdominal tenderness. There is no right CVA tenderness or left CVA tenderness. Musculoskeletal:         General: No swelling. Cervical back: Normal range of motion and neck supple. Skin:     General: Skin is warm and dry. Capillary Refill: Capillary refill takes less than 2 seconds. Neurological:      General: No focal deficit present. Mental Status: She is alert and oriented to person, place, and time.    Psychiatric:         Mood and Affect: Mood normal.         Vital Signs  ED Triage Vitals [07/07/23 2048]   Temperature Pulse Respirations Blood Pressure SpO2   98.1 °F (36.7 °C) 71 18 160/73 95 %      Temp Source Heart Rate Source Patient Position - Orthostatic VS BP Location FiO2 (%)   Oral Monitor Lying Left arm --      Pain Score       --           Vitals:    07/07/23 2048 07/07/23 2100 07/07/23 2330   BP: 160/73 143/63 126/61   Pulse: 71 63 68   Patient Position - Orthostatic VS: Lying Lying Lying         Visual Acuity  Visual Acuity    Flowsheet Row Most Recent Value   L Pupil Size (mm) 3   R Pupil Size (mm) 3          ED Medications  Medications   sodium chloride 0.9 % bolus 500 mL (0 mL Intravenous Stopped 7/7/23 2333)       Diagnostic Studies  Results Reviewed     Procedure Component Value Units Date/Time    HS Troponin I 2hr [[id number]] Collected: 07/07/23 2346    Lab Status:  In process Specimen: Blood from Arm, Right Updated: 07/07/23 2349    HS Troponin I 4hr [[id number]]     Lab Status: No result Specimen: Blood     TSH [[id number]]  (Abnormal) Collected: 07/07/23 2134    Lab Status: Final result Specimen: Blood from Arm, Right Updated: 07/07/23 2230     TSH 3RD GENERATON 6.813 uIU/mL     B-Type Natriuretic Peptide(BNP) [[id number]]  (Abnormal) Collected: 07/07/23 2134    Lab Status: Final result Specimen: Blood from Arm, Right Updated: 07/07/23 2220      pg/mL     HS Troponin 0hr (reflex protocol) [[id number]]  (Normal) Collected: 07/07/23 2134    Lab Status: Final result Specimen: Blood from Arm, Right Updated: 07/07/23 2210     hs TnI 0hr 6 ng/L     Comprehensive metabolic panel [[id number]]  (Abnormal) Collected: 07/07/23 2134    Lab Status: Final result Specimen: Blood from Arm, Right Updated: 07/07/23 2204     Sodium 129 mmol/L      Potassium 3.5 mmol/L      Chloride 97 mmol/L      CO2 26 mmol/L      ANION GAP 6 mmol/L      BUN 11 mg/dL      Creatinine 0.66 mg/dL      Glucose 116 mg/dL      Calcium 8.5 mg/dL      AST 18 U/L      ALT 13 U/L      Alkaline Phosphatase 52 U/L      Total Protein 5.9 g/dL      Albumin 3.8 g/dL      Total Bilirubin 0.66 mg/dL      eGFR 86 ml/min/1.73sq m     Narrative:      [place] guidelines for Chronic Kidney Disease (CKD):   •  Stage 1 with normal or high GFR (GFR > 90 mL/min/1.73 square meters)  •  Stage 2 Mild CKD (GFR = 60-89 mL/min/1.73 square meters)  •  Stage 3A Moderate CKD (GFR = 45-59 mL/min/1.73 square meters)  •  Stage 3B Moderate CKD (GFR = 30-44 mL/min/1.73 square meters)  •  Stage 4 Severe CKD (GFR = 15-29 mL/min/1.73 square meters)  •  Stage 5 End Stage CKD (GFR <15 mL/min/1.73 square meters)  Note: GFR calculation is accurate only with a steady state creatinine    Urine Microscopic [[id number]]  (Normal) Collected: 07/07/23 2138    Lab Status: Final result Specimen: Urine, Clean Catch Updated: 07/07/23 2201     RBC, UA None Seen /hpf      WBC, UA 0-1 /hpf      Epithelial Cells Occasional /hpf      Bacteria, UA Occasional /hpf     UA w Reflex to Microscopic w Reflex to Culture [[id number]]  (Abnormal) Collected: 07/07/23 2138    Lab Status: Final result Specimen: Urine, Clean Catch Updated: 07/07/23 2200     Color, UA Yellow     Clarity, UA Clear     Specific Gravity, UA 1.015     pH, UA 6.0     Leukocytes, UA Trace     Nitrite, UA Negative     Protein, UA Trace mg/dl      Glucose, UA Negative mg/dl      Ketones, UA Negative mg/dl      Urobilinogen, UA <2.0 mg/dl      Bilirubin, UA Negative     Occult Blood, UA Trace    CBC and differential [[id number]] Collected: 07/07/23 2134    Lab Status: Final result Specimen: Blood from Arm, Right Updated: 07/07/23 2147     WBC 8.75 Thousand/uL      RBC 4.11 Million/uL      Hemoglobin 12.1 g/dL      Hematocrit 36.2 %      MCV 88 fL      MCH 29.4 pg      MCHC 33.4 g/dL      RDW 11.9 %      MPV 9.5 fL      Platelets 035 Thousands/uL      nRBC 0 /100 WBCs      Neutrophils Relative 68 %      Immat GRANS % 1 %      Lymphocytes Relative 19 %      Monocytes Relative 10 %      Eosinophils Relative 1 %      Basophils Relative 1 %      Neutrophils Absolute 6.12 Thousands/µL      Immature Grans Absolute 0.07 Thousand/uL      Lymphocytes Absolute 1.63 Thousands/µL      Monocytes Absolute 0.83 Thousand/µL      Eosinophils Absolute 0.05 Thousand/µL      Basophils Absolute 0.05 Thousands/µL                  XR chest 1 view portable   ED Interpretation by Trish Comer PA-C (07/08 0000)   ED Interpretation: No acute cardiopulmonary disease.       CTA head and neck with and without contrast    (Results Pending)              Procedures  ECG 12 Lead Documentation Only    Date/Time: 7/7/2023 11:12 PM    Performed by: Tayler Balderas PA-C  Authorized by: Tayler Balderas PA-C    Rate:     ECG rate:  63    ECG rate assessment: normal    Rhythm:     Rhythm: sinus rhythm    Ectopy:     Ectopy: none    QRS:     QRS axis:  Normal  Conduction:     Conduction: normal    ST segments:     ST segments:  Normal  T waves:     T waves: normal               ED Course                               SBIRT 22yo+    Flowsheet Row Most Recent Value   Initial Alcohol Screen: US AUDIT-C     1. How often do you have a drink containing alcohol? 0 Filed at: 07/07/2023 2048   2. How many drinks containing alcohol do you have on a typical day you are drinking? 0 Filed at: 07/07/2023 2048   3a. Male UNDER 65: How often do you have five or more drinks on one occasion? 0 Filed at: 07/07/2023 2048   3b. FEMALE Any Age, or MALE 65+: How often do you have 4 or more drinks on one occassion? 0 Filed at: 07/07/2023 2048   Audit-C Score 0 Filed at: 07/07/2023 2048   YOSEF: How many times in the past year have you. .. Used an illegal drug or used a prescription medication for non-medical reasons? Never Filed at: 07/07/2023 2048                    Medical Decision Making  68-year-old female presents to the ED for evaluation of 3 syncopal episodes over the last 2 days. Patient does also complain worsening tinnitus just before syncopal episodes though states she does have chronic tinnitus in bilateral ears. Patient afebrile with normal vital signs in ED, AOx3, well-appearing on exam.  CBC unremarkable, CMP unremarkable, initial troponin unremarkable, EKG appeared acutely unremarkable, BNP mildly elevated 449 though no signs of volume overload on exam.  Discussed case with Letyim who was agreeable to admission advised CTA head and neck with and without contrast prior to leaving ED. Amount and/or Complexity of Data Reviewed  Labs: ordered.   Radiology: ordered and independent interpretation performed. Risk  Decision regarding hospitalization. Disposition  Final diagnoses:   Syncope   Tinnitus of both ears     Time reflects when diagnosis was documented in both MDM as applicable and the Disposition within this note     Time User Action Codes Description Comment    7/8/2023 12:02 AM Jb Bermeo Add [R55] Syncope     7/8/2023 12:02 AM Jb Bermeo Add [H93.13] Tinnitus of both ears       ED Disposition     ED Disposition   Admit    Condition   Stable    Date/Time   Sat Jul 8, 2023 12:03 AM    Comment   Case was discussed with Marina Caceres and the patient's admission status was agreed to be Admission Status: inpatient status to the service of Dr. Mervin Fabian. Follow-up Information    None         Patient's Medications   Discharge Prescriptions    No medications on file       No discharge procedures on file.     PDMP Review     None          ED Provider  Electronically Signed by           Jb Bermeo PA-C  07/08/23 0005

## 2023-07-09 LAB
ANION GAP SERPL CALCULATED.3IONS-SCNC: 4 MMOL/L
BASOPHILS # BLD AUTO: 0.05 THOUSANDS/ÂΜL (ref 0–0.1)
BASOPHILS NFR BLD AUTO: 1 % (ref 0–1)
BUN SERPL-MCNC: 8 MG/DL (ref 5–25)
CALCIUM SERPL-MCNC: 8.5 MG/DL (ref 8.4–10.2)
CHLORIDE SERPL-SCNC: 110 MMOL/L (ref 96–108)
CO2 SERPL-SCNC: 26 MMOL/L (ref 21–32)
CREAT SERPL-MCNC: 0.57 MG/DL (ref 0.6–1.3)
EOSINOPHIL # BLD AUTO: 0.13 THOUSAND/ÂΜL (ref 0–0.61)
EOSINOPHIL NFR BLD AUTO: 2 % (ref 0–6)
ERYTHROCYTE [DISTWIDTH] IN BLOOD BY AUTOMATED COUNT: 12.5 % (ref 11.6–15.1)
GFR SERPL CREATININE-BSD FRML MDRD: 90 ML/MIN/1.73SQ M
GLUCOSE SERPL-MCNC: 94 MG/DL (ref 65–140)
HCT VFR BLD AUTO: 35 % (ref 34.8–46.1)
HGB BLD-MCNC: 11.6 G/DL (ref 11.5–15.4)
IMM GRANULOCYTES # BLD AUTO: 0.06 THOUSAND/UL (ref 0–0.2)
IMM GRANULOCYTES NFR BLD AUTO: 1 % (ref 0–2)
LYMPHOCYTES # BLD AUTO: 2.05 THOUSANDS/ÂΜL (ref 0.6–4.47)
LYMPHOCYTES NFR BLD AUTO: 31 % (ref 14–44)
MAGNESIUM SERPL-MCNC: 2 MG/DL (ref 1.9–2.7)
MCH RBC QN AUTO: 29.7 PG (ref 26.8–34.3)
MCHC RBC AUTO-ENTMCNC: 33.1 G/DL (ref 31.4–37.4)
MCV RBC AUTO: 90 FL (ref 82–98)
MONOCYTES # BLD AUTO: 0.8 THOUSAND/ÂΜL (ref 0.17–1.22)
MONOCYTES NFR BLD AUTO: 12 % (ref 4–12)
NEUTROPHILS # BLD AUTO: 3.54 THOUSANDS/ÂΜL (ref 1.85–7.62)
NEUTS SEG NFR BLD AUTO: 53 % (ref 43–75)
NRBC BLD AUTO-RTO: 0 /100 WBCS
PHOSPHATE SERPL-MCNC: 2.3 MG/DL (ref 2.3–4.1)
PLATELET # BLD AUTO: 193 THOUSANDS/UL (ref 149–390)
PMV BLD AUTO: 9.1 FL (ref 8.9–12.7)
POTASSIUM SERPL-SCNC: 3.7 MMOL/L (ref 3.5–5.3)
RBC # BLD AUTO: 3.91 MILLION/UL (ref 3.81–5.12)
SODIUM SERPL-SCNC: 140 MMOL/L (ref 135–147)
WBC # BLD AUTO: 6.63 THOUSAND/UL (ref 4.31–10.16)

## 2023-07-09 PROCEDURE — 84100 ASSAY OF PHOSPHORUS: CPT | Performed by: INTERNAL MEDICINE

## 2023-07-09 PROCEDURE — 99232 SBSQ HOSP IP/OBS MODERATE 35: CPT | Performed by: HOSPITALIST

## 2023-07-09 PROCEDURE — 83735 ASSAY OF MAGNESIUM: CPT | Performed by: INTERNAL MEDICINE

## 2023-07-09 PROCEDURE — 85025 COMPLETE CBC W/AUTO DIFF WBC: CPT | Performed by: INTERNAL MEDICINE

## 2023-07-09 PROCEDURE — 80048 BASIC METABOLIC PNL TOTAL CA: CPT | Performed by: INTERNAL MEDICINE

## 2023-07-09 RX ORDER — ANTIOX #8/OM3/DHA/EPA/LUT/ZEAX 250-2.5 MG
1 CAPSULE ORAL
Status: DISCONTINUED | OUTPATIENT
Start: 2023-07-10 | End: 2023-07-10 | Stop reason: HOSPADM

## 2023-07-09 RX ORDER — MELATONIN
2000 DAILY
Status: DISCONTINUED | OUTPATIENT
Start: 2023-07-09 | End: 2023-07-10 | Stop reason: HOSPADM

## 2023-07-09 RX ORDER — RALOXIFENE HYDROCHLORIDE 60 MG/1
60 TABLET, FILM COATED ORAL DAILY
COMMUNITY

## 2023-07-09 RX ORDER — SUCRALFATE 1 G/1
1 TABLET ORAL
Status: DISCONTINUED | OUTPATIENT
Start: 2023-07-10 | End: 2023-07-10 | Stop reason: HOSPADM

## 2023-07-09 RX ORDER — SUCRALFATE 1 G/1
1 TABLET ORAL
Status: DISCONTINUED | OUTPATIENT
Start: 2023-07-09 | End: 2023-07-10 | Stop reason: HOSPADM

## 2023-07-09 RX ORDER — SUCRALFATE 1 G/1
500 TABLET ORAL
Status: ON HOLD | COMMUNITY
End: 2023-07-10 | Stop reason: SDUPTHER

## 2023-07-09 RX ORDER — FLUTICASONE PROPIONATE 50 MCG
1 SPRAY, SUSPENSION (ML) NASAL 2 TIMES DAILY
Status: DISCONTINUED | OUTPATIENT
Start: 2023-07-09 | End: 2023-07-10 | Stop reason: HOSPADM

## 2023-07-09 RX ADMIN — FLUTICASONE PROPIONATE 1 SPRAY: 50 SPRAY, METERED NASAL at 08:59

## 2023-07-09 RX ADMIN — Medication 2000 UNITS: at 09:50

## 2023-07-09 RX ADMIN — FLUTICASONE FUROATE AND VILANTEROL TRIFENATATE 1 PUFF: 200; 25 POWDER RESPIRATORY (INHALATION) at 09:04

## 2023-07-09 RX ADMIN — SUCRALFATE 1 G: 1 TABLET ORAL at 18:45

## 2023-07-09 RX ADMIN — MULTIPLE VITAMINS W/ MINERALS TAB 1 TABLET: TAB ORAL at 09:04

## 2023-07-09 RX ADMIN — METOPROLOL TARTRATE 25 MG: 25 TABLET, FILM COATED ORAL at 21:38

## 2023-07-09 RX ADMIN — ATORVASTATIN CALCIUM 40 MG: 40 TABLET, FILM COATED ORAL at 17:39

## 2023-07-09 RX ADMIN — METOPROLOL TARTRATE 25 MG: 25 TABLET, FILM COATED ORAL at 09:04

## 2023-07-09 RX ADMIN — LEVOTHYROXINE SODIUM 50 MCG: 50 TABLET ORAL at 05:13

## 2023-07-09 RX ADMIN — Medication 1 CAPSULE: at 18:32

## 2023-07-09 RX ADMIN — ALBUTEROL SULFATE 2 PUFF: 90 AEROSOL, METERED RESPIRATORY (INHALATION) at 12:04

## 2023-07-09 RX ADMIN — SUCRALFATE 1 G: 1 TABLET ORAL at 09:04

## 2023-07-09 NOTE — NURSING NOTE
Patient requesting for a doctor to see her today. Noted note from Dr. Khalida López, earlier today. Patient unaware of Dr's visitation today. Patient appears anxious and unable to sit. She is pacing the room with multiple papers in hand with many notes and questions for staff. Messaged swing-shift WALTER Kidd, who spent a long amount of time at bedside with patient and her family visitor. Patient very satisfied with his answers to her questions.

## 2023-07-09 NOTE — PROGRESS NOTES
[facility]  Progress Note  Name: Mary Jo Pineda  MRN: [id number]  Unit/Bed#: -01 I Date of Admission: 7/7/2023   Date of Service: 7/9/2023 I Hospital Day: 1    Assessment/Plan   Tinnitus of both ears  Assessment & Plan  History of chronic tinnitus. Denies any pulsatile tinnitus. Tinnitus gets worse prior to syncope and loss of consciousness. -- CTA did not demonstrate any acute vascular concerns that would cause VBI or syncope. Urine incontinence  Assessment & Plan  Patient has a history of stress incontinence which occurs when she coughs hard and has a small volume of incontinence. However large incontinence episode with loss of consciousness which saturated her pants and created a puddle.     -Spot EEG rule out seizure    Hypothyroidism (acquired)  Assessment & Plan  Acquired hypothyroidism with resection of partial thyroid. On levothyroxine 50 mcg daily. TSH elevated to 6  -- Plan to check free T4 level  --Increase levothyroxine by 25 mcg to 75 mcg daily  -- Patient follows outpatient endocrinology, continue following with them    Chronic hyponatremia  Assessment & Plan  Appears to have chronic hyponatremia with baseline of 1 33-1 34 from the past 2 to 3 years. Her last sodium was 139 back in February. RESOLVED. Syncope  Assessment & Plan  Patient had a syncopal episode that started on Thursday morning when she was in bed supine with her head on her pillow talking to a friend. When she passed out. She could feel the onset of blacked out vision and dizziness without the room spinning prior to losing consciousness. On Friday she had another episode of syncope with about 5-second realization that she was going to pass out she lowered herself to the ground and then lost consciousness at which point her  called 911. When patient woke up she told patient's  to cancel the 911 call. Later on Friday patient had another episode of syncope.   During one of her syncopal episodes she had major urinary incontinence which caused secondary infection of her pants as well for Puddle nearby. Patient was both supine and sitting and lowered herself to the ground from walking around in the kitchen during each of these syncopal events and did not suddenly get up from sitting to standing position at any given moment. Does have history of vasovagal syncope as a younger woman. She does remember around 1990s having a syncopal episode where in patient was told that she was having jerking repetitive motion at that time. Patient has chronic tinnitus however tinnitus gets worse in the moments prior to loss of consciousness. Non-pulsatile tinnitus -- less suspicious of idiopathic intracranial hypertension, dural AVM, arterial bruit etc.     Vasovagal syncope versus seizure versus vertebrobasilar insufficiency vs seizure versus orthostasis. With slight prodromal dizziness prior to syncope unclear whether it is cardiogenic in nature though patient does have history of coronary vasospasm on left heart cath.      -- TTE echocardiogram, rule out structural heart disease  -- Telemetry monitoring  -- Spot EEG  -- CTA of the head and neck, neg for  vertebrobasilar insufficiency, dural AVM, potential masses  -- Consider CT head vs MRI of the brain & neuro consult if EEG is positive for seizures  -- Monitor fever curve  -- Monitor electrolytes and replete as needed    Vasospastic angina (HCC)  Assessment & Plan  History of abnormal stress test with relatively normal left heart cath, concerning for coronary vasospasm.   - Patient on nitroglycerin 0.4 mg sublingual as needed- pt does not use and free of anginal symptoms  -Lopressor 25 mg every 12 hours for antianginal effect  -Prior to admission aspirin 81 mg was discontinued by her cardiologist due to stomach upset (aspirin 81 was for medical management of vasospastic angina versus micro vascular angina)  - On rosuvastatin at home, will continue with atorvastatin 40 mg daily here    Mild persistent asthma  Assessment & Plan  On albuterol as needed and Breo Ellipta at home. -- Continue albuterol as needed  -- Fluticasone vilanterol daily  -- Continue fluticasone nasal spray            VTE  Prophylaxis:   Pharmacologic: in place  Mechanical VTE Prophylaxis in Place: Yes    Patient Centered Rounds: I have performed bedside rounds with nursing staff today. Discussions with Specialists or Other Care Team Provider: case management    Education and Discussions with Family / Patient: pt      Current Length of Stay: 1 day(s)    Current Patient Status: Inpatient        Code Status: Level 1 - Full Code    Discharge Plan: Pt will require continued inpatient hospitalization. Subjective:     Pt seen  No further syncopal episodes  No chest pain or sob or abd pain    Patient is seen and examined at bedside. All other ROS are negative. Objective:     Vitals:   Temp (24hrs), Av.9 °F (36.6 °C), Min:97.6 °F (36.4 °C), Max:98 °F (36.7 °C)    Temp:  [97.6 °F (36.4 °C)-98 °F (36.7 °C)] 97.9 °F (36.6 °C)  HR:  [61-73] 69  Resp:  [16-18] 16  BP: (127-164)/(64-71) 150/68  SpO2:  [93 %-97 %] 95 %  Body mass index is 21.92 kg/m². Input and Output Summary (last 24 hours):        Intake/Output Summary (Last 24 hours) at 2023 1215  Last data filed at 2023 1840  Gross per 24 hour   Intake 240 ml   Output --   Net 240 ml       Physical Exam:       GEN: No acute distress, comfortable  HEEENT: No JVD, PERRLA, no scleral icterus  RESP: Lungs clear to auscultation bilaterally  CV: RRR, +s1/s2   ABD: SOFT NON TENDER, POSITIVE BOWEL SOUNDS, NO DISTENTION  PSYCH: CALM  NEURO: Mentation baseline, NO FOCAL DEFICITS  SKIN: NO RASH  EXTREM: NO EDEMA    Additional Data:     Labs:    Results from last 7 days   Lab Units 23  0438   WBC Thousand/uL 6.63   HEMOGLOBIN g/dL 11.6   HEMATOCRIT % 35.0   PLATELETS Thousands/uL 193   NEUTROS PCT % 53   LYMPHS PCT % 31   MONOS PCT % 12   EOS PCT % 2     Results from last 7 days   Lab Units 07/09/23  0438 07/08/23  0535 07/07/23  2134   SODIUM mmol/L 140   < > 129*   POTASSIUM mmol/L 3.7   < > 3.5   CHLORIDE mmol/L 110*   < > 97   CO2 mmol/L 26   < > 26   BUN mg/dL 8   < > 11   CREATININE mg/dL 0.57*   < > 0.66   ANION GAP mmol/L 4   < > 6   CALCIUM mg/dL 8.5   < > 8.5   ALBUMIN g/dL  --   --  3.8   TOTAL BILIRUBIN mg/dL  --   --  0.66   ALK PHOS U/L  --   --  52   ALT U/L  --   --  13   AST U/L  --   --  18   GLUCOSE RANDOM mg/dL 94   < > 116    < > = values in this interval not displayed. Lines/Drains:  Invasive Devices     Peripheral Intravenous Line  Duration           Peripheral IV 07/07/23 Right Antecubital 1 day                Telemetry:   Telemetry Orders (From admission, onward)             24 Hour Telemetry Monitoring  Continuous x 24 Hours (Telem)        Question:  Reason for 24 Hour Telemetry  Answer:  Syncope suspected to be cardiac in origin                    * I Have Reviewed All Lab Data Listed Above. Imaging:     Results for orders placed during the hospital encounter of 07/07/23    XR chest 1 view portable    Narrative  CHEST    INDICATION:   syncope. COMPARISON: 2/27/2023    EXAM PERFORMED/VIEWS:  XR CHEST PORTABLE      FINDINGS:    Cardiomediastinal silhouette appears unremarkable. Scar or subsegmental atelectasis in both mid lower lung zones with otherwise clear lungs. No pneumothorax or pleural effusion. Osseous structures appear within normal limits for patient age. Impression  No acute cardiopulmonary disease. Workstation performed: [id number]    Results for orders placed during the hospital encounter of 12/12/22    XR chest pa & lateral    Narrative  CHEST    INDICATION:   R06.02: Shortness of breath. COMPARISON:  CXR 9/17/2022 and chest CT 7/1/2021.     EXAM PERFORMED/VIEWS:  XR CHEST PA & LATERAL  DUAL ENERGY SUBTRACTION. FINDINGS:    Cardiomediastinal silhouette appears unremarkable. The lungs are clear. No pneumothorax or pleural effusion. Mild curvature of the spine. Impression  No acute cardiopulmonary disease. Workstation performed: [id number]      *I have reviewed all imaging reports listed above      Recent Cultures (last 7 days):           Last 24 Hours Medication List:   Current Facility-Administered Medications   Medication Dose Route Frequency Provider Last Rate   • albuterol  2 puff Inhalation Q4H PRN Oscar Feliciano MD     • atorvastatin  40 mg Oral Daily With Arnulfo Combs MD     • cholecalciferol  2,000 Units Oral Daily Jaswinder Jhaveri MD     • fluticasone  1 spray Each Nare BID Jaswinder Jhaveri MD     • fluticasone-vilanterol  1 puff Inhalation Daily Oscar Feliciano MD     • glycerin-hypromellose-  1 drop Both Eyes Q6H PRN Oscar Feliciano MD     • levothyroxine  50 mcg Oral Early Morning Lucie Dominguez PA-C     • metoprolol tartrate  25 mg Oral Q12H Claudia Kamara MD     • metroNIDAZOLE  1 application. Topical Daily Oscar Feliciano MD     • nitroglycerin  0.4 mg Sublingual Q5 Min PRN Oscar Feliciano MD     • ondansetron  4 mg Intravenous Q4H PRN Freeman Benz DO     • [START ON 7/10/2023] PreserVision AREDS 2  1 capsule Oral BID after meals Jaswinder Jhaveri MD     • [START ON 7/10/2023] sucralfate  1 g Oral Daily With Lunch Jaswinder Jhaveri MD     • sucralfate  1 g Oral Daily With Matthew Salgado MD          Today, Patient Was Seen By: Jaswinder Jhaveri MD    ** Please Note: Dictation voice to text software may have been used in the creation of this document.  **

## 2023-07-09 NOTE — PLAN OF CARE
Problem: PAIN - ADULT  Goal: Verbalizes/displays adequate comfort level or baseline comfort level  Description: Interventions:  - Encourage patient to monitor pain and request assistance  - Assess pain using appropriate pain scale  - Administer analgesics based on type and severity of pain and evaluate response  - Implement non-pharmacological measures as appropriate and evaluate response  - Consider cultural and social influences on pain and pain management  - Notify physician/advanced practitioner if interventions unsuccessful or patient reports new pain  Outcome: Progressing     Problem: INFECTION - ADULT  Goal: Absence or prevention of progression during hospitalization  Description: INTERVENTIONS:  - Assess and monitor for signs and symptoms of infection  - Monitor lab/diagnostic results  - Monitor all insertion sites, i.e. indwelling lines, tubes, and drains  - Monitor endotracheal if appropriate and nasal secretions for changes in amount and color  - [place] appropriate cooling/warming therapies per order  - Administer medications as ordered  - Instruct and encourage patient and family to use good hand hygiene technique  - Identify and instruct in appropriate isolation precautions for identified infection/condition  Outcome: Progressing  Goal: Absence of fever/infection during neutropenic period  Description: INTERVENTIONS:  - Monitor WBC    Outcome: Progressing     Problem: SAFETY ADULT  Goal: Patient will remain free of falls  Description: INTERVENTIONS:  - Educate patient/family on patient safety including physical limitations  - Instruct patient to call for assistance with activity   - Consult OT/PT to assist with strengthening/mobility   - Keep Call bell within reach  - Keep bed low and locked with side rails adjusted as appropriate  - Keep care items and personal belongings within reach  - Initiate and maintain comfort rounds  - Make Fall Risk Sign visible to staff  - Offer Toileting every 2 Hours, in advance of need  - Initiate/Maintain bed alarm  - Obtain necessary fall risk management equipment:   - Apply yellow socks and bracelet for high fall risk patients  - Consider moving patient to room near nurses station  Outcome: Progressing  Goal: Maintain or return to baseline ADL function  Description: INTERVENTIONS:  -  Assess patient's ability to carry out ADLs; assess patient's baseline for ADL function and identify physical deficits which impact ability to perform ADLs (bathing, care of mouth/teeth, toileting, grooming, dressing, etc.)  - Assess/evaluate cause of self-care deficits   - Assess range of motion  - Assess patient's mobility; develop plan if impaired  - Assess patient's need for assistive devices and provide as appropriate  - Encourage maximum independence but intervene and supervise when necessary  - Involve family in performance of ADLs  - Assess for home care needs following discharge   - Consider OT consult to assist with ADL evaluation and planning for discharge  - Provide patient education as appropriate  Outcome: Progressing  Goal: Maintains/Returns to pre admission functional level  Description: INTERVENTIONS:  - Perform BMAT or MOVE assessment daily.   - Set and communicate daily mobility goal to care team and patient/family/caregiver. - Collaborate with rehabilitation services on mobility goals if consulted  - Perform Range of Motion 3 times a day. - Reposition patient every 3 hours.   - Dangle patient 3 times a day  - Stand patient 3 times a day  - Ambulate patient 3 times a day  - Out of bed to chair 3 times a day   - Out of bed for meals 3 times a day  - Out of bed for toileting  - Record patient progress and toleration of activity level   Outcome: Progressing     Problem: DISCHARGE PLANNING  Goal: Discharge to home or other facility with appropriate resources  Description: INTERVENTIONS:  - Identify barriers to discharge w/patient and caregiver  - Arrange for needed discharge resources and transportation as appropriate  - Identify discharge learning needs (meds, wound care, etc.)  - Arrange for interpretive services to assist at discharge as needed  - Refer to Case Management Department for coordinating discharge planning if the patient needs post-hospital services based on physician/advanced practitioner order or complex needs related to functional status, cognitive ability, or social support system  Outcome: Progressing     Problem: Knowledge Deficit  Goal: Patient/family/caregiver demonstrates understanding of disease process, treatment plan, medications, and discharge instructions  Description: Complete learning assessment and assess knowledge base.   Interventions:  - Provide teaching at level of understanding  - Provide teaching via preferred learning methods  Outcome: Progressing     Problem: RESPIRATORY - ADULT  Goal: Achieves optimal ventilation and oxygenation  Description: INTERVENTIONS:  - Assess for changes in respiratory status  - Assess for changes in mentation and behavior  - Position to facilitate oxygenation and minimize respiratory effort  - Oxygen administered by appropriate delivery if ordered  - Initiate smoking cessation education as indicated  - Encourage broncho-pulmonary hygiene including cough, deep breathe, Incentive Spirometry  - Assess the need for suctioning and aspirate as needed  - Assess and instruct to report SOB or any respiratory difficulty  - Respiratory Therapy support as indicated  Outcome: Progressing

## 2023-07-09 NOTE — PLAN OF CARE
Problem: PAIN - ADULT  Goal: Verbalizes/displays adequate comfort level or baseline comfort level  Description: Interventions:  - Encourage patient to monitor pain and request assistance  - Assess pain using appropriate pain scale  - Administer analgesics based on type and severity of pain and evaluate response  - Implement non-pharmacological measures as appropriate and evaluate response  - Consider cultural and social influences on pain and pain management  - Notify physician/advanced practitioner if interventions unsuccessful or patient reports new pain  Outcome: Progressing     Problem: INFECTION - ADULT  Goal: Absence or prevention of progression during hospitalization  Description: INTERVENTIONS:  - Assess and monitor for signs and symptoms of infection  - Monitor lab/diagnostic results  - Monitor all insertion sites, i.e. indwelling lines, tubes, and drains  - Monitor endotracheal if appropriate and nasal secretions for changes in amount and color  - [place] appropriate cooling/warming therapies per order  - Administer medications as ordered  - Instruct and encourage patient and family to use good hand hygiene technique  - Identify and instruct in appropriate isolation precautions for identified infection/condition  Outcome: Progressing  Goal: Absence of fever/infection during neutropenic period  Description: INTERVENTIONS:  - Monitor WBC    Outcome: Progressing     Problem: SAFETY ADULT  Goal: Patient will remain free of falls  Description: INTERVENTIONS:  - Educate patient/family on patient safety including physical limitations  - Instruct patient to call for assistance with activity   - Consult OT/PT to assist with strengthening/mobility   - Keep Call bell within reach  - Keep bed low and locked with side rails adjusted as appropriate  - Keep care items and personal belongings within reach  - Initiate and maintain comfort rounds  - Make Fall Risk Sign visible to staff  - Offer Toileting every 2 Hours, in advance of need  - Initiate/Maintain bed alarm  - Obtain necessary fall risk management equipment:   - Apply yellow socks and bracelet for high fall risk patients  - Consider moving patient to room near nurses station  Outcome: Progressing  Goal: Maintain or return to baseline ADL function  Description: INTERVENTIONS:  -  Assess patient's ability to carry out ADLs; assess patient's baseline for ADL function and identify physical deficits which impact ability to perform ADLs (bathing, care of mouth/teeth, toileting, grooming, dressing, etc.)  - Assess/evaluate cause of self-care deficits   - Assess range of motion  - Assess patient's mobility; develop plan if impaired  - Assess patient's need for assistive devices and provide as appropriate  - Encourage maximum independence but intervene and supervise when necessary  - Involve family in performance of ADLs  - Assess for home care needs following discharge   - Consider OT consult to assist with ADL evaluation and planning for discharge  - Provide patient education as appropriate  Outcome: Progressing  Goal: Maintains/Returns to pre admission functional level  Description: INTERVENTIONS:  - Perform BMAT or MOVE assessment daily.   - Set and communicate daily mobility goal to care team and patient/family/caregiver. - Collaborate with rehabilitation services on mobility goals if consulted  - Perform Range of Motion 3 times a day. - Reposition patient every 3 hours.   - Dangle patient 3 times a day  - Stand patient 3 times a day  - Ambulate patient 3 times a day  - Out of bed to chair 3 times a day   - Out of bed for meals 3 times a day  - Out of bed for toileting  - Record patient progress and toleration of activity level   Outcome: Progressing     Problem: DISCHARGE PLANNING  Goal: Discharge to home or other facility with appropriate resources  Description: INTERVENTIONS:  - Identify barriers to discharge w/patient and caregiver  - Arrange for needed discharge resources and transportation as appropriate  - Identify discharge learning needs (meds, wound care, etc.)  - Arrange for interpretive services to assist at discharge as needed  - Refer to Case Management Department for coordinating discharge planning if the patient needs post-hospital services based on physician/advanced practitioner order or complex needs related to functional status, cognitive ability, or social support system  Outcome: Progressing     Problem: Knowledge Deficit  Goal: Patient/family/caregiver demonstrates understanding of disease process, treatment plan, medications, and discharge instructions  Description: Complete learning assessment and assess knowledge base.   Interventions:  - Provide teaching at level of understanding  - Provide teaching via preferred learning methods  Outcome: Progressing     Problem: RESPIRATORY - ADULT  Goal: Achieves optimal ventilation and oxygenation  Description: INTERVENTIONS:  - Assess for changes in respiratory status  - Assess for changes in mentation and behavior  - Position to facilitate oxygenation and minimize respiratory effort  - Oxygen administered by appropriate delivery if ordered  - Initiate smoking cessation education as indicated  - Encourage broncho-pulmonary hygiene including cough, deep breathe, Incentive Spirometry  - Assess the need for suctioning and aspirate as needed  - Assess and instruct to report SOB or any respiratory difficulty  - Respiratory Therapy support as indicated  Outcome: Progressing

## 2023-07-10 ENCOUNTER — APPOINTMENT (INPATIENT)
Dept: NON INVASIVE DIAGNOSTICS | Facility: HOSPITAL | Age: 73
DRG: 312 | End: 2023-07-10
Payer: COMMERCIAL

## 2023-07-10 ENCOUNTER — APPOINTMENT (INPATIENT)
Dept: NEUROLOGY | Facility: HOSPITAL | Age: 73
DRG: 312 | End: 2023-07-10
Payer: COMMERCIAL

## 2023-07-10 VITALS
WEIGHT: 116 LBS | OXYGEN SATURATION: 98 % | HEIGHT: 61 IN | HEART RATE: 74 BPM | DIASTOLIC BLOOD PRESSURE: 71 MMHG | RESPIRATION RATE: 16 BRPM | BODY MASS INDEX: 21.9 KG/M2 | SYSTOLIC BLOOD PRESSURE: 140 MMHG | TEMPERATURE: 98.3 F

## 2023-07-10 PROBLEM — R32 URINE INCONTINENCE: Status: RESOLVED | Noted: 2023-07-08 | Resolved: 2023-07-10

## 2023-07-10 LAB
ALBUMIN SERPL BCP-MCNC: 3.7 G/DL (ref 3.5–5)
ALP SERPL-CCNC: 49 U/L (ref 34–104)
ALT SERPL W P-5'-P-CCNC: 23 U/L (ref 7–52)
ANION GAP SERPL CALCULATED.3IONS-SCNC: 4 MMOL/L
AORTIC ROOT: 2.3 CM
APICAL FOUR CHAMBER EJECTION FRACTION: 72 %
ASCENDING AORTA: 2.8 CM
AST SERPL W P-5'-P-CCNC: 42 U/L (ref 13–39)
AV REGURGITATION PRESSURE HALF TIME: 394 MS
BASOPHILS # BLD AUTO: 0.06 THOUSANDS/ÂΜL (ref 0–0.1)
BASOPHILS NFR BLD AUTO: 1 % (ref 0–1)
BILIRUB SERPL-MCNC: 0.58 MG/DL (ref 0.2–1)
BUN SERPL-MCNC: 11 MG/DL (ref 5–25)
CALCIUM SERPL-MCNC: 8.7 MG/DL (ref 8.4–10.2)
CHLORIDE SERPL-SCNC: 107 MMOL/L (ref 96–108)
CO2 SERPL-SCNC: 28 MMOL/L (ref 21–32)
CREAT SERPL-MCNC: 0.55 MG/DL (ref 0.6–1.3)
DOP CALC LVOT AREA: 3.14 CM2
DOP CALC LVOT DIAMETER: 2 CM
E WAVE DECELERATION TIME: 94 MS
EOSINOPHIL # BLD AUTO: 0.12 THOUSAND/ÂΜL (ref 0–0.61)
EOSINOPHIL NFR BLD AUTO: 2 % (ref 0–6)
ERYTHROCYTE [DISTWIDTH] IN BLOOD BY AUTOMATED COUNT: 12.2 % (ref 11.6–15.1)
FRACTIONAL SHORTENING: 42 % (ref 28–44)
GFR SERPL CREATININE-BSD FRML MDRD: 92 ML/MIN/1.73SQ M
GLUCOSE SERPL-MCNC: 91 MG/DL (ref 65–140)
HCT VFR BLD AUTO: 37 % (ref 34.8–46.1)
HGB BLD-MCNC: 12.6 G/DL (ref 11.5–15.4)
IMM GRANULOCYTES # BLD AUTO: 0.06 THOUSAND/UL (ref 0–0.2)
IMM GRANULOCYTES NFR BLD AUTO: 1 % (ref 0–2)
INTERVENTRICULAR SEPTUM IN DIASTOLE (PARASTERNAL SHORT AXIS VIEW): 1.1 CM
INTERVENTRICULAR SEPTUM: 1.1 CM (ref 0.6–1.1)
LEFT ATRIUM AREA SYSTOLE SINGLE PLANE A4C: 14.4 CM2
LEFT ATRIUM SIZE: 3.8 CM
LEFT INTERNAL DIMENSION IN SYSTOLE: 2.5 CM (ref 2.1–4)
LEFT VENTRICULAR INTERNAL DIMENSION IN DIASTOLE: 4.3 CM (ref 3.5–6)
LEFT VENTRICULAR POSTERIOR WALL IN END DIASTOLE: 0.8 CM
LEFT VENTRICULAR STROKE VOLUME: 61 ML
LVSV (TEICH): 61 ML
LYMPHOCYTES # BLD AUTO: 2.5 THOUSANDS/ÂΜL (ref 0.6–4.47)
LYMPHOCYTES NFR BLD AUTO: 32 % (ref 14–44)
MCH RBC QN AUTO: 30.1 PG (ref 26.8–34.3)
MCHC RBC AUTO-ENTMCNC: 34.1 G/DL (ref 31.4–37.4)
MCV RBC AUTO: 89 FL (ref 82–98)
MONOCYTES # BLD AUTO: 0.89 THOUSAND/ÂΜL (ref 0.17–1.22)
MONOCYTES NFR BLD AUTO: 11 % (ref 4–12)
MV E'TISSUE VEL-SEP: 7 CM/S
MV PEAK A VEL: 1.1 M/S
MV PEAK E VEL: 71 CM/S
MV STENOSIS PRESSURE HALF TIME: 27 MS
MV VALVE AREA P 1/2 METHOD: 8.15 CM2
NEUTROPHILS # BLD AUTO: 4.24 THOUSANDS/ÂΜL (ref 1.85–7.62)
NEUTS SEG NFR BLD AUTO: 53 % (ref 43–75)
NRBC BLD AUTO-RTO: 0 /100 WBCS
PLATELET # BLD AUTO: 212 THOUSANDS/UL (ref 149–390)
PLATELET # BLD AUTO: 220 THOUSANDS/UL (ref 149–390)
PMV BLD AUTO: 9.1 FL (ref 8.9–12.7)
PMV BLD AUTO: 9.2 FL (ref 8.9–12.7)
POTASSIUM SERPL-SCNC: 3.5 MMOL/L (ref 3.5–5.3)
PROT SERPL-MCNC: 5.6 G/DL (ref 6.4–8.4)
RBC # BLD AUTO: 4.18 MILLION/UL (ref 3.81–5.12)
RIGHT ATRIUM AREA SYSTOLE A4C: 13.5 CM2
RIGHT VENTRICLE ID DIMENSION: 3.3 CM
SL CV AV DECELERATION TIME RETROGRADE: 1358 MS
SL CV AV PEAK GRADIENT RETROGRADE: 66 MMHG
SL CV LV EF: 65
SL CV PED ECHO LEFT VENTRICLE DIASTOLIC VOLUME (MOD BIPLANE) 2D: 84 ML
SL CV PED ECHO LEFT VENTRICLE SYSTOLIC VOLUME (MOD BIPLANE) 2D: 23 ML
SL CV TR MAX PG ANTEGRADE: 31 MMHG
SODIUM SERPL-SCNC: 139 MMOL/L (ref 135–147)
TR MAX PG: 34 MMHG
TR PEAK VELOCITY: 2.9 M/S
TRICUSPID ANNULAR PLANE SYSTOLIC EXCURSION: 2 CM
TRICUSPID VALVE PEAK REGURGITATION VELOCITY: 2.91 M/S
TV MEAN GRADIENT: 25 MMHG
TV MV D: 2.43 M/S
TV VTI: 91.83 CM
WBC # BLD AUTO: 7.87 THOUSAND/UL (ref 4.31–10.16)

## 2023-07-10 PROCEDURE — 99222 1ST HOSP IP/OBS MODERATE 55: CPT | Performed by: STUDENT IN AN ORGANIZED HEALTH CARE EDUCATION/TRAINING PROGRAM

## 2023-07-10 PROCEDURE — 85049 AUTOMATED PLATELET COUNT: CPT | Performed by: PHYSICIAN ASSISTANT

## 2023-07-10 PROCEDURE — 99232 SBSQ HOSP IP/OBS MODERATE 35: CPT | Performed by: PHYSICIAN ASSISTANT

## 2023-07-10 PROCEDURE — 93306 TTE W/DOPPLER COMPLETE: CPT

## 2023-07-10 PROCEDURE — 80053 COMPREHEN METABOLIC PANEL: CPT | Performed by: HOSPITALIST

## 2023-07-10 PROCEDURE — 99239 HOSP IP/OBS DSCHRG MGMT >30: CPT | Performed by: PHYSICIAN ASSISTANT

## 2023-07-10 PROCEDURE — 85025 COMPLETE CBC W/AUTO DIFF WBC: CPT | Performed by: HOSPITALIST

## 2023-07-10 PROCEDURE — 99223 1ST HOSP IP/OBS HIGH 75: CPT | Performed by: INTERNAL MEDICINE

## 2023-07-10 PROCEDURE — 95816 EEG AWAKE AND DROWSY: CPT

## 2023-07-10 PROCEDURE — 93306 TTE W/DOPPLER COMPLETE: CPT | Performed by: INTERNAL MEDICINE

## 2023-07-10 PROCEDURE — 95819 EEG AWAKE AND ASLEEP: CPT | Performed by: PSYCHIATRY & NEUROLOGY

## 2023-07-10 RX ORDER — SUCRALFATE 1 G/1
TABLET ORAL
Refills: 0
Start: 2023-07-10

## 2023-07-10 RX ORDER — ENOXAPARIN SODIUM 100 MG/ML
40 INJECTION SUBCUTANEOUS DAILY
Status: DISCONTINUED | OUTPATIENT
Start: 2023-07-10 | End: 2023-07-10 | Stop reason: HOSPADM

## 2023-07-10 RX ADMIN — Medication 2000 UNITS: at 08:49

## 2023-07-10 RX ADMIN — Medication 1 CAPSULE: at 08:52

## 2023-07-10 RX ADMIN — Medication 1 SPRAY: at 08:49

## 2023-07-10 RX ADMIN — FLUTICASONE FUROATE AND VILANTEROL TRIFENATATE 1 PUFF: 200; 25 POWDER RESPIRATORY (INHALATION) at 08:49

## 2023-07-10 RX ADMIN — SUCRALFATE 1 G: 1 TABLET ORAL at 12:51

## 2023-07-10 RX ADMIN — ENOXAPARIN SODIUM 40 MG: 100 INJECTION SUBCUTANEOUS at 12:51

## 2023-07-10 RX ADMIN — LEVOTHYROXINE SODIUM 50 MCG: 50 TABLET ORAL at 06:44

## 2023-07-10 RX ADMIN — METOPROLOL TARTRATE 25 MG: 25 TABLET, FILM COATED ORAL at 08:49

## 2023-07-10 NOTE — DISCHARGE SUMMARY
[facility]  Discharge- Gale Azul 1950, 74 y.o. female MRN: [id number]  Unit/Bed#: [facility] Encounter: [id number]  Primary Care Provider: Richard Rolon DO   Date and time admitted to hospital: 7/7/2023  8:42 PM    * Syncope  Assessment & Plan  · Patient had a syncopal episode that started on Thursday morning when she was in bed supine with her head on her pillow talking to a friend. She could feel the onset of blacked out vision and dizziness without the room spinning prior to losing consciousness. On Friday she had another episode of syncope with about 5-second realization that she was going to pass out she lowered herself to the ground and then lost consciousness at which point her  called 911. When patient woke up she told patient's  to cancel the 911 call. Later on Friday patient had another episode of syncope. During one of her syncopal episodes she had major urinary incontinence which caused secondary infection of her pants as well for Puddle nearby. Patient was both supine and sitting and lowered herself to the ground from walking around in the kitchen during each of these syncopal events and did not suddenly get up from sitting to standing position at any given moment. Does have history of vasovagal syncope as a younger woman. She does remember around 1990s having a syncopal episode where in patient was told that she was having jerking repetitive motion at that time.     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 Denies any pulsatile tinnitus. Tinnitus gets worse prior to syncope and loss of consciousness. CTA did not demonstrate any acute vascular concerns that would cause VBI or syncope. Urine incontinence  Assessment & Plan  · Patient has a history of stress incontinence     Hypothyroidism (acquired)  Assessment & Plan  · Acquired hypothyroidism with resection of partial thyroid. On levothyroxine 50 mcg daily. · TSH elevated to 6, normal free T4  · Continue outpatient follow-up with endocrinology with repeat thyroid function testing in 4 to 6 weeks      Chronic hyponatremia  Assessment & Plan  Appears to have chronic hyponatremia with baseline of 133-1 34 from the past 2 to 3 years.   Sodium stable    Vasospastic angina (HCC)  Assessment & Plan  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  · Normal epicardial coronaries on cardiac cath in March 2023; it did show transient EKG ST/T wave changes suggestive of possible vasospasm  · Continue antianginal therapy with metoprolol tartrate 25 mg every 12 hours  · Continue statin  · Patient on nitroglycerin 0.4 mg sublingual as needed- pt does not use and free of anginal symptoms  · Prior to admission aspirin 81 mg was discontinued by her cardiologist due to stomach upset (aspirin 81 was for medical management of vasospastic angina versus micro vascular angina)  · Has denied any chest pain or palpitations prior to syncopal episodes  · Appreciate cardiology consult while here  · Echo reviewed: EF 65%, grade 1 diastolic dysfunction, mild AR, moderate MR, mild to moderate TR, mild TX, pulmonary artery systolic pressure is upper normal  · Recommend outpatient event monitor  · Follow-up with cardiology    Mild persistent asthma  Assessment & Plan  · On albuterol as needed and Breo Ellipta at home. · Without acute exacerbation      Medical Problems     Resolved Problems  Date Reviewed: 7/10/2023   None       Discharging Physician / Practitioner: Peggy Gomes PA-C  PCP: Santiago Saucedo DO  Admission Date:   Admission Orders (From admission, onward)     Ordered        07/08/23 0003  INPATIENT ADMISSION  Once                      Discharge Date: 07/10/23    Consultations During Hospital Stay:  · Cardiology, neurology    Procedures Performed:   · EEG   · Echocardiogram:   Interpretation Summary       •  Left Ventricle: Left ventricular cavity size is normal. Wall thickness is normal. The left ventricular ejection fraction is 65%. Systolic function is normal. Wall motion is normal. Diastolic function is mildly abnormal, consistent with grade I (abnormal) relaxation. •  Left Atrium: The atrium is mildly dilated. •  Aortic Valve: There is mild regurgitation. •  Mitral Valve: There is mild annular calcification. There is moderate regurgitation. •  Tricuspid Valve: There is mild to moderate regurgitation. •  Pulmonic Valve: There is mild regurgitation. •  Pulmonary Artery: The pulmonary artery systolic pressure is upper normal.     Findings    Left Ventricle Left ventricular cavity size is normal. Wall thickness is normal. The left ventricular ejection fraction is 65%. Systolic function is normal.  Wall motion is normal. Diastolic function is mildly abnormal, consistent with grade I (abnormal) relaxation. Right Ventricle Right ventricular cavity size is normal. Systolic function is normal. Wall thickness is normal.      Left Atrium The atrium is mildly dilated. Right Atrium The atrium is normal in size. Aortic Valve The aortic valve is trileaflet. The leaflets are not thickened. The leaflets are not calcified. The leaflets exhibit normal mobility. There is mild regurgitation. The aortic valve has no significant stenosis. Mitral Valve There is mild diffuse thickening. There is mild calcification. The leaflets exhibit normal mobility. There is mild annular calcification. There is moderate regurgitation. There is no evidence of stenosis. Tricuspid Valve Tricuspid valve structure is normal. There is mild to moderate regurgitation. There is no evidence of stenosis. Pulmonic Valve Pulmonic valve structure is normal. There is mild regurgitation. There is no evidence of stenosis. Ascending Aorta The aortic root is normal in size. The ascending aorta is normal in size. IVC/SVC The inferior vena cava is normal in size. Pericardium There is no pericardial effusion. The pericardium is normal in appearance. Pulmonary Artery The pulmonary artery systolic pressure is upper normal.      Pulmonary Veins The pulmonary veins have normal venous flow. Flow pattern is normal.              Significant Findings / Test Results:   · Chest x-ray:  FINDINGS:     Cardiomediastinal silhouette appears unremarkable.     Scar or subsegmental atelectasis in both mid lower lung zones with otherwise clear lungs. No pneumothorax or pleural effusion.     Osseous structures appear within normal limits for patient age.     IMPRESSION:     No acute cardiopulmonary disease.     · CTA head/neck:  ADDENDUM:     -------------------------     *     Incidental note is made of 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     *     -------------------------      Addended by Sheron Velasco MD on 7/8/2023  9:30 AM     Study Result    Narrative & Impression   CTA NECK AND BRAIN WITH AND WITHOUT CONTRAST     INDICATION: Syncope, recurrent  Syncope and tinnitis;cta head and neck w wo: Patient complains of nausea that started yesterday with one episode of vomiting. She states that she "blacked out" at least 3 x since yesterday. Denies falling. She does report one episode of incontinence   hx of thyroid surgery     COMPARISON:   None.     TECHNIQUE:  Routine CT imaging of the Brain without contrast.  Post contrast imaging was performed after administration of iodinated contrast through the neck and brain. Post contrast axial 0.625 mm images timed to opacify the arterial system.     3D rendering was performed on an independent workstation. MIP reconstructions performed. Coronal reconstructions were performed of the noncontrast portion of the brain.     Radiation dose length product (DLP) for this visit:  1239 mGy-cm . This examination, like all CT scans performed in the [facility], was performed utilizing techniques to minimize radiation dose exposure, including the use of iterative   reconstruction and automated exposure control.     IV Contrast:  85 mL of iohexol (OMNIPAQUE)     IMAGE QUALITY:   Diagnostic     FINDINGS:     Preliminary interpretation provided by [facility].       NONCONTRAST BRAIN  PARENCHYMA: Decreased attenuation is noted in periventricular and subcortical white matter demonstrating an appearance that is statistically most likely to represent mild microangiopathic change.     No CT signs of acute infarction. No intracranial mass, mass effect or midline shift. No acute parenchymal hemorrhage.     VENTRICLES AND EXTRA-AXIAL SPACES: Ventricles and extra-axial CSF spaces are prominent commensurate with the degree of volume loss. No hydrocephalus.   No acute extra-axial hemorrhage.     VISUALIZED ORBITS: Normal visualized orbits.     PARANASAL SINUSES: Normal visualized paranasal sinuses.     CERVICAL VASCULATURE  AORTIC ARCH AND GREAT VESSELS: Common trunk of the innominate and left common carotid arteries with early bifurcation of the innominate artery noted. Aortic arch great vessel origins are widely patent.     RIGHT VERTEBRAL ARTERY CERVICAL SEGMENT:  Normal origin. The vessel is normal in caliber throughout the neck.     LEFT VERTEBRAL ARTERY CERVICAL SEGMENT:  Normal origin. The vessel is diminutive in caliber throughout the neck.     RIGHT EXTRACRANIAL CAROTID SEGMENT:  Normal caliber common carotid artery. Normal bifurcation and cervical internal carotid artery. No stenosis or dissection.     LEFT EXTRACRANIAL CAROTID SEGMENT:  Normal caliber common carotid artery. Normal bifurcation and cervical internal carotid artery. No stenosis or dissection. Mild atherosclerotic calcification of the proximal and mid cervical internal carotid arteries   noted without hemodynamically significant stenosis identified. There is bifurcation of the left cervical internal carotid artery at the C2-3 level yielding a dominant persistent hypoglossal artery which is a normal variant, and which is the primary   supply to the posterior circulation.     NASCET criteria was used to determine the degree of internal carotid artery diameter stenosis.       INTRACRANIAL VASCULATURE  INTERNAL CAROTID ARTERIES:  Normal enhancement of the intracranial portions of the internal carotid arteries. Normal ophthalmic artery origins. Normal ICA terminus.     ANTERIOR CIRCULATION:  Symmetric A1 segments and anterior cerebral arteries with normal enhancement. Normal anterior communicating artery.     MIDDLE CEREBRAL ARTERY CIRCULATION:  M1 segment and middle cerebral artery branches demonstrate normal enhancement bilaterally.     DISTAL VERTEBRAL ARTERIES: Distal right vertebral artery is rather diminutive as is the distal left vertebral artery which appears to terminate at the PICA. Posterior circulation provided predominantly via a persistent left hypoglossal artery, a normal   variant.  This vessel appears to unite with the contralateral diminutive distal right vertebral artery forming a normal size basilar artery.     BASILAR ARTERY:  Basilar artery is normal in caliber. Normal superior cerebellar arteries.     POSTERIOR CEREBRAL ARTERIES: Both posterior cerebral arteries arises from the basilar tip. Both arteries demonstrate normal enhancement. Small hypoplastic posterior communicating arteries bilaterally.     VENOUS STRUCTURES:  Normal.        NON VASCULAR ANATOMY  BONY STRUCTURES:  No acute osseous abnormality.     SOFT TISSUES OF THE NECK: Status post left hemithyroidectomy.     THORACIC INLET:  Normal.        IMPRESSION:        1. No evidence for large vessel vascular occlusion or hemodynamically significant stenosis in the head and neck. No aneurysm identified. 2. Persistent left hypoglossal artery which is the primary supply of the posterior circulation. This is a normal variant. Rather diminutive native vertebral arteries bilaterally. 3. Atherosclerotic calcification of the cervical left internal carotid artery without hemodynamically significant stenosis noted. 4. Changes of mild microangiopathic disease without acute intracranial abnormality.        The findings concur with the preliminary report provided by Dr. Dustin Campbell of [facility]. 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 Discussed this finding with patient, she has no neck pain numbness or tingling at this time possibly developmental      Test Results Pending at Discharge (will require follow up):    · Work-up for neuropathy including MMA, folate and vitamin B12  · Outpatient event monitor     Outpatient Tests Requested:  · Cardiology follow-up, GI follow-up, neurology follow-up,  · PCP in 1 week for posthospital follow-up    Complications:      Reason for Admission: Syncope    Hospital Course:   Tia Ayala is a 74 y.o. female patient who originally presented to the hospital on 7/7/2023 due to multiple syncope episodes. For syncopal episode started on Thursday morning when she was in bed supine with her head on her pillow talking to a friend. She could feel the onset of blacked out vision and dizziness without the room spinning prior to losing consciousness. On Friday she had another episode of syncope with about 5-second realization that she was going to pass out she lowered herself to the ground and then lost consciousness at which point her  called 911. When patient woke up she told patient's  to cancel the 911 call. Later on Friday patient had another episode of syncope. During one of her syncopal episodes she had major urinary incontinence which caused secondary infection of her pants as well for Puddle nearby. Patient was both supine and sitting and lowered herself to the ground from walking around in the kitchen during each of these syncopal events and did not suddenly get up from sitting to standing position at any given moment. Does have history of vasovagal syncope as a younger woman. She does remember around 1990s having a syncopal episode where in patient was told that she was having jerking repetitive motion at that time. Patient was seen in consultation with neurology. Discussed with neurology, low suspicion for seizure activity. Recommending outpatient follow-up for neuropathy evaluation. Her orthostatic vital signs were negative. Echocardiogram performed as noted above. 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  She denies any neck pain possibly developmental.     Patient was seen in consultation with cardiology. Recommend outpatient event monitor for syncopal episodes. Cleared for discharge from neurology standpoint. Outpatient follow-up with neurology for follow-up and work-up of neuropathy. Patient was noted to have elevated TSH with normal free T4. She follows with endocrinology outpatient recommend ongoing follow-up with repeat thyroid function testing in 4 to 6 weeks. Patient also has a history of chronic nausea uses Zofran as needed at home. She does have nausea prior to syncopal episodes. Referral placed for GI follow-up outpatient for ongoing work-up and management. Continue ENT follow-up for history of tinnitus. Please see above list of diagnoses and related plan for additional information. Condition at Discharge: stable    Discharge Day Visit / Exam:   Subjective: No recurrent syncopal episodes. No chest pain palpitations lightheadedness dizziness, vision changes. Numbness tingling. No other acute complaints or syncopal episodes while here  Vitals: Blood Pressure: 163/77 (07/10/23 0800)  Pulse: 72 (07/10/23 0800)  Temperature: 98.1 °F (36.7 °C) (07/10/23 0713)  Temp Source: Oral (07/07/23 2048)  Respirations: 18 (07/10/23 0713)  Height: 5' 1" (154.9 cm) (07/10/23 0800)  Weight - Scale: 52.6 kg (116 lb) (07/10/23 0800)  SpO2: 98 % (07/10/23 0713)    Discharge instructions/Information to patient and family:   See after visit summary for information provided to patient and family. Provisions for Follow-Up Care:  See after visit summary for information related to follow-up care and any pertinent home health orders. Disposition:   Home    Planned Readmission: none     Discharge Statement:  I spent 45 minutes discharging the patient. This time was spent on the day of discharge. I had direct contact with the patient on the day of discharge.  Greater than 50% of the total time was spent examining patient, answering all patient questions, arranging and discussing plan of care with patient as well as directly providing post-discharge instructions. Additional time then spent on discharge activities. Discharge Medications:  See after visit summary for reconciled discharge medications provided to patient and/or family.       **Please Note: This note may have been constructed using a voice recognition system**

## 2023-07-10 NOTE — CONSULTS
Consult - Cardiology   Lupe Azul 74 y.o. female MRN: [id number]  Unit/Bed#: -01 Encounter: [id number]        Reason For Consult:    Outpatient Cardiologist:               ASSESSMENT:  1. Syncope  a. Multiple syncopal episodes within the last week etiology of which is unclear but generally have onset of spinning dizziness, gradually blackening of vision, and sometimes urinary incontinence  b. Orthostatic vital signs are negative  2. History of coronary vasospasm vs microvascular angina  a. 2/2023 stress echo: Concordant positive ECG and echocardiogram findings concerning for ischemia  b. 3/2023 LHC: No angiographic evidence of obstructive CAD, transient EKG changes suggestive of possible vasospasm  c. 7/2023 Echo: LVEF 99%, grade 1 diastolic dysfunction, mild left atrial dilation, moderate mitral valve regurgitation, mild to moderate tricuspid regurgitation  3. Hypothyroidism  4. History of asthma    PLAN/ DISCUSSION:     • Her syncopal episodes do sound probably vagal mediated. She has a history of vasovagal syncope for essentially her entire life precipitated by nausea which she had within the last week around the time of her current episodes. For completeness sake we could consider a 1-2-week extended ambulatory monitor, especially as her most recent syncopal episode was different and the fact that she had urinary incontinence. Telemetry thus far unremarkable this admission    History Of Present Illness: This is a 75-year-old female with known history of vasospastic angina for which she takes metoprolol twice daily and her symptoms are very well controlled. She follows with our office on a regular basis. She has had full cardiac work-up within the last year including stress echocardiogram, cardiac catheterization, and transthoracic echo. Presently she is hospitalized for syncope. She has had multiple syncopal episodes in the last year.   These have been precipitated by nausea which is something that she tells me she has had her whole life. Last Thursday she was nauseous and did not eat breakfast.  She took a Zofran. She walked down to her kitchen later in the day and felt as if she was going to pass out. She has had this feeling for many years. She lowered herself to the floor and passed out for perhaps 5 minutes. She then woke up and her  then called 911. She actually called 911 back and canceled the call as this passing out was not something new for her. Friday she was laying on her bed talking to a friend on the phone and she thinks she either fell asleep briefly or possibly passed out for about 5 minutes. Later that same day she was sitting on her couch and had a similar episode. The only difference was that this time after she woke up she was incontinent of urine which was something very different from her prior episodes. She called her primary care doctor who told her to come to the hospital for evaluation and she was admitted from there. Since her admission she has felt fine but she has also had no further nausea. Past Medical History:        Past Medical History:   Diagnosis Date   • Asthma    • Cataract    • Disease of thyroid gland    • GERD (gastroesophageal reflux disease)    • Hyperlipidemia    • Kidney stone    • Melanoma ([address]) 2022      Past Surgical History:   Procedure Laterality Date   • CARDIAC CATHETERIZATION N/A 03/13/2023    Procedure: Cardiac Coronary Angiogram;  Surgeon: Mina Cruz DO;  Location: [facility]; Service: Cardiology   • CARDIAC CATHETERIZATION  03/13/2023    Procedure: Cardiac catheterization;  Surgeon: Mina Cruz DO;  Location: [facility]; Service: Cardiology   • CARDIAC CATHETERIZATION Left 03/13/2023    Procedure: Cardiac Left Heart Cath;  Surgeon: Mina Cruz DO;  Location: [facility];   Service: Cardiology   • COLONOSCOPY     • ROTATOR CUFF REPAIR Right     6/2022   • THYROID SURGERY Allergy:        Allergies   Allergen Reactions   • Amlodipine Edema     hands   • Amoxicillin Abdominal Pain, GI Intolerance and Nausea Only   • Clotrimazole Vomiting   • Cyclobenzaprine Nausea Only   • Naproxen Nausea Only   • Nystatin Nausea Only   • Other      Very sensitive to strong perfumes, sprays, smoke and exercise etc.  Recently dx with asthma   • Tramadol Nausea Only       Medications:       Prior to Admission medications    Medication Sig Start Date End Date Taking?  Authorizing Provider   albuterol (PROVENTIL HFA,VENTOLIN HFA) 90 mcg/act inhaler Inhale 2 puffs every 4 (four) hours as needed for shortness of breath 7/5/22  Yes Mariana [place] ERICH Covarrubias   Cholecalciferol 50 MCG (2000 UT) CAPS Take 1 tablet by mouth daily   Yes Historical Provider, MD   conjugated estrogens (PREMARIN) vaginal cream Insert 1 g into the vagina once a week 11/27/17 7/8/23 Yes Historical Provider, MD   fluticasone (VERAMYST) 27.5 MCG/SPRAY nasal spray 1 spray into each nostril daily  Patient taking differently: 1 spray into each nostril 2 (two) times a day 10/19/20  Yes Lynnette Ji DO   fluticasone-vilanterol (Breo Ellipta) 200-25 mcg/actuation inhaler Inhale 1 puff daily Rinse mouth after use. 6/29/23  Yes ERICH Herrera   levothyroxine 50 mcg tablet Take 50 mcg by mouth daily in the early morning 7/31/18  Yes Historical Provider, MD   metoprolol tartrate (LOPRESSOR) 25 mg tablet Take 1 tablet (25 mg total) by mouth every 12 (twelve) hours 3/24/23  Yes Em Riley MD   metroNIDAZOLE (METROGEL) 1 % gel Apply 1 application topically daily 11/6/08  Yes Historical Provider, MD   Multiple Vitamins-Minerals (PRESERVISION AREDS 2 PO) Take 1 capsule by mouth 2 (two) times a day with meals   Yes Historical Provider, MD   ondansetron (ZOFRAN) 4 mg tablet Take 4 mg by mouth every 8 (eight) hours as needed for nausea 2/1/23  Yes Historical Provider, MD   polyethylene glycol-propylene glycol (SYSTANE) 0.4-0.3 % Apply 1 drop to eye Three times a day   Yes Historical Provider, MD   raloxifene (EVISTA) 60 mg tablet Take 60 mg by mouth daily   Yes Historical Provider, MD   rosuvastatin (CRESTOR) 20 MG tablet Take 1 tablet (20 mg total) by mouth daily  Patient taking differently: Take 20 mg by mouth daily with dinner 6/9/23  Yes Lolis Pineda MD   sucralfate (CARAFATE) 1 g tablet Take 1 g by mouth daily with lunch One tablet with lunch 4/26/22  Yes Historical Provider, MD   sucralfate (CARAFATE) 1 g tablet Take 500 g by mouth daily after dinner Half a tablet after dinner   Yes Historical Provider, MD   nitroglycerin (NITROSTAT) 0.4 mg SL tablet Place 1 tablet (0.4 mg total) under the tongue every 5 (five) minutes as needed for chest pain  Patient not taking: Reported on 4/27/2023 2/27/23   Lolis Pineda MD   Peak Flow Meter NANDA 1 Act by Does not apply route 2 (two) times a day 2/20/18   Historical Provider, MD       Family History:     Family History   Problem Relation Age of Onset   • Hypertension Father    • Cancer Maternal Aunt         lip cancer        Social History:       Social History     Socioeconomic History   • Marital status: /Civil Union     Spouse name: None   • Number of children: None   • Years of education: None   • Highest education level: None   Occupational History   • None   Tobacco Use   • Smoking status: Never     Passive exposure: Past   • Smokeless tobacco: Never   Vaping Use   • Vaping Use: Never used   Substance and Sexual Activity   • Alcohol use: Never   • Drug use: Never   • Sexual activity: Not Currently   Other Topics Concern   • None   Social History Narrative    Drinks 3 cups of coffee daily      Social Determinants of Health     Financial Resource Strain: Not on file   Food Insecurity: Not on file   Transportation Needs: Not on file   Physical Activity: Not on file   Stress: Not on file   Social Connections: Not on file   Intimate Partner Violence: Not on file Housing Stability: Not on file       ROS:  14 point ROS negative except as outlined above  Remainder review of systems is negative    Exam:  General:  alert, oriented and in no distress, cooperative  Head: Normocephalic, atraumatic. Eyes:  EOMI. Pupils - equal, round, reactive to accomodation. No icterus. Normal Conjunctiva. Oropharynx: moist and normal-appearing mucosa  Neck: supple, symmetrical, trachea midline and no JVD  Heart:  RRR, No: murmer, rub or gallop, S1 & S2 normal   Respiratory effort / Chest Inspection: unlabored  Lungs:  normal air entry, lungs clear to auscultation and no rales, rhonchi or wheezing   Abdomen: flat, normal findings: bowel sounds normal and soft, non-tender  Lower Limbs:  no pitting edema  Pulses[de-identified]  RLE - DP: present 2+                 LLE - DP: present 2+  Musculoskeletal: ROM grossly normal        DATA:      ECG:                     Telemetry: Normal sinus rhythm, . HR 60s and 70s          Echocardiogram:           Ischemic Testing:         Weights: Wt Readings from Last 3 Encounters:   07/10/23 52.6 kg (116 lb)   03/24/23 52.6 kg (116 lb)   03/13/23 50.8 kg (112 lb)   , Body mass index is 21.92 kg/m².          Lab Studies:             Results from last 7 days   Lab Units 07/10/23  1232 07/10/23  0411 07/09/23  0438 07/08/23  0535   WBC Thousand/uL  --  7.87 6.63 7.43   HEMOGLOBIN g/dL  --  12.6 11.6 11.2*   HEMATOCRIT %  --  37.0 35.0 33.6*   PLATELETS Thousands/uL 220 212 193 190   ,   Results from last 7 days   Lab Units 07/10/23  0411 07/09/23  0438 07/08/23  0535 07/07/23  2134   POTASSIUM mmol/L 3.5 3.7 3.7 3.5   CHLORIDE mmol/L 107 110* 108 97   CO2 mmol/L 28 26 27 26   BUN mg/dL 11 8 8 11   CREATININE mg/dL 0.55* 0.57* 0.56* 0.66   CALCIUM mg/dL 8.7 8.5 8.0* 8.5   ALK PHOS U/L 49  --   --  52   ALT U/L 23  --   --  13   AST U/L 42*  --   --  18

## 2023-07-10 NOTE — ASSESSMENT & PLAN NOTE
· Acquired hypothyroidism with resection of partial thyroid. On levothyroxine 50 mcg daily.   · TSH elevated to 6, normal free T4  · Continue outpatient follow-up with endocrinology with repeat thyroid function testing in 4 to 6 weeks

## 2023-07-10 NOTE — PROGRESS NOTES
[facility]  Progress Note  Name: Kwabena Chavez  MRN: [id number]  Unit/Bed#: -01 I Date of Admission: 7/7/2023   Date of Service: 7/10/2023 I Hospital Day: 2    Assessment/Plan   * Syncope  Assessment & Plan  · Patient had a syncopal episode that started on Thursday morning when she was in bed supine with her head on her pillow talking to a friend. She could feel the onset of blacked out vision and dizziness without the room spinning prior to losing consciousness. On Friday she had another episode of syncope with about 5-second realization that she was going to pass out she lowered herself to the ground and then lost consciousness at which point her  called 911. When patient woke up she told patient's  to cancel the 911 call. Later on Friday patient had another episode of syncope. During one of her syncopal episodes she had major urinary incontinence which caused secondary infection of her pants as well for Puddle nearby. Patient was both supine and sitting and lowered herself to the ground from walking around in the kitchen during each of these syncopal events and did not suddenly get up from sitting to standing position at any given moment. Does have history of vasovagal syncope as a younger woman. She does remember around 1990s having a syncopal episode where in patient was told that she was having jerking repetitive motion at that time.     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 Denies any pulsatile tinnitus. Tinnitus gets worse prior to syncope and loss of consciousness. CTA did not demonstrate any acute vascular concerns that would cause VBI or syncope. Urine incontinence  Assessment & Plan  · Patient has a history of stress incontinence which occurs when she coughs hard and has a small volume of incontinence. However large incontinence episode with loss of consciousness which saturated her pants and created a puddle. · EEG pending  · Neurology consulted      Hypothyroidism (acquired)  Assessment & Plan  · Acquired hypothyroidism with resection of partial thyroid. On levothyroxine 50 mcg daily. · TSH elevated to 6, normal free T4  · Continue outpatient follow-up with endocrinology with repeat thyroid function testing in 4 to 6 weeks      Chronic hyponatremia  Assessment & Plan  Appears to have chronic hyponatremia with baseline of 133-1 34 from the past 2 to 3 years.   Sodium stable    Vasospastic angina (HCC)  Assessment & Plan  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  · Normal epicardial coronaries on cardiac cath in March 2023; it did show transient EKG ST/T wave changes suggestive of possible vasospasm  · Continue antianginal therapy with metoprolol tartrate 25 mg every 12 hours  · Continue statin  · Patient on nitroglycerin 0.4 mg sublingual as needed- pt does not use and free of anginal symptoms  · Prior to admission aspirin 81 mg was discontinued by her cardiologist due to stomach upset (aspirin 81 was for medical management of vasospastic angina versus micro vascular angina)  · Has denied any chest pain or palpitations prior to syncopal episodes    Mild persistent asthma  Assessment & Plan  · On albuterol as needed and Breo Ellipta at home. · Without acute exacerbation             VTE Pharmacologic Prophylaxis:   Moderate Risk (Score 3-4) - Pharmacological DVT Prophylaxis Ordered: enoxaparin (Lovenox). Patient Centered Rounds: I performed bedside rounds with nursing staff today. Discussions with Specialists or Other Care Team Provider: will review with neurology     Education and Discussions with Family / Patient: Patient declined call to . Total Time Spent on Date of Encounter in care of patient: 25 minutes This time was spent on one or more of the following: performing physical exam; counseling and coordination of care; obtaining or reviewing history; documenting in the medical record; reviewing/ordering tests, medications or procedures; communicating with other healthcare professionals and discussing with patient's family/caregivers. Current Length of Stay: 2 day(s)  Current Patient Status: Inpatient   Certification Statement: The patient will continue to require additional inpatient hospital stay due to Syncope  Discharge Plan: Anticipate discharge tomorrow to home. Code Status: Level 1 - Full Code    Subjective:   Patient doing well today. No recurrent syncopal episodes no chest pain shortness of breath palpitations lightheadedness or dizziness. She does get aura-like symptoms prior to syncopal episodes but denies ever having chest pain or palpitations prior to episodes. No numbness or tingling. She did have 1 episode of syncope with loss of urine control. States that she feels like her head is full with increased tinnitus prior to syncopal episodes. She also states that some of the episodes are related to her nausea.     Objective:     Vitals:   Temp (24hrs), Av.4 °F (36.9 °C), Min:98.1 °F (36.7 °C), Max:98.6 °F (37 °C)    Temp:  [98.1 °F (36.7 °C)-98.6 °F (37 °C)] 98.1 °F (36.7 °C)  HR:  [71-72] 72  Resp:  [16-18] 18  BP: (153-165)/(67-77) 163/77  SpO2:  [97 %-98 %] 98 %  Body mass index is 21.92 kg/m². Input and Output Summary (last 24 hours): Intake/Output Summary (Last 24 hours) at 7/10/2023 1206  Last data filed at 7/10/2023 1025  Gross per 24 hour   Intake 1050 ml   Output --   Net 1050 ml       Physical Exam:   Physical Exam  Vitals and nursing note reviewed. Constitutional:       Appearance: Normal appearance. Cardiovascular:      Rate and Rhythm: Normal rate and regular rhythm. Heart sounds: No murmur heard. Pulmonary:      Effort: Pulmonary effort is normal.      Breath sounds: Normal breath sounds. Abdominal:      General: Bowel sounds are normal.      Palpations: Abdomen is soft. Musculoskeletal:      Right lower leg: No edema. Left lower leg: No edema. Skin:     General: Skin is warm. Neurological:      General: No focal deficit present. Mental Status: She is alert. Mental status is at baseline.    Psychiatric:         Mood and Affect: Mood normal.          Additional Data:     Labs:  Results from last 7 days   Lab Units 07/10/23  0411   WBC Thousand/uL 7.87   HEMOGLOBIN g/dL 12.6   HEMATOCRIT % 37.0   PLATELETS Thousands/uL 212   NEUTROS PCT % 53   LYMPHS PCT % 32   MONOS PCT % 11   EOS PCT % 2     Results from last 7 days   Lab Units 07/10/23  0411   SODIUM mmol/L 139   POTASSIUM mmol/L 3.5   CHLORIDE mmol/L 107   CO2 mmol/L 28   BUN mg/dL 11   CREATININE mg/dL 0.55*   ANION GAP mmol/L 4   CALCIUM mg/dL 8.7   ALBUMIN g/dL 3.7   TOTAL BILIRUBIN mg/dL 0.58   ALK PHOS U/L 49   ALT U/L 23   AST U/L 42*   GLUCOSE RANDOM mg/dL 91                       Lines/Drains:  Invasive Devices     Peripheral Intravenous Line  Duration           Peripheral IV 07/07/23 Right Antecubital 2 days                  Telemetry:  Telemetry Orders (From admission, onward)             24 Hour Telemetry Monitoring  Continuous x 24 Hours (Telem)        Question:  Reason for 24 Hour Telemetry  Answer:  Syncope suspected to be cardiac in origin Telemetry Reviewed: place back on telemetry   Indication for Continued Telemetry Use: Syncope             Imaging: Reviewed radiology reports from this admission including: CT head    Recent Cultures (last 7 days):         Last 24 Hours Medication List:   Current Facility-Administered Medications   Medication Dose Route Frequency Provider Last Rate   • albuterol  2 puff Inhalation Q4H PRN Surendra Shen MD     • atorvastatin  40 mg Oral Daily With Jm Duron MD     • cholecalciferol  2,000 Units Oral Daily Jeremy Mckee MD     • fluticasone  1 spray Each Nare BID Jeremy Mckee MD     • fluticasone-vilanterol  1 puff Inhalation Daily Surendra Shen MD     • glycerin-hypromellose-  1 drop Both Eyes Q6H PRN Surendra Shen MD     • levothyroxine  50 mcg Oral Early Morning Felicitas Dominguez PA-C     • metoprolol tartrate  25 mg Oral Q12H Mee Shaw MD     • metroNIDAZOLE  1 application. Topical Daily Surendra Shen MD     • nitroglycerin  0.4 mg Sublingual Q5 Min PRN Surendra Shen MD     • ondansetron  4 mg Intravenous Q4H PRN Beatrice Ng DO     • PreserVision AREDS 2  1 capsule Oral BID after meals Jeremy Mckee MD     • sucralfate  1 g Oral Daily With Lunch Jeremy Mckee MD     • sucralfate  1 g Oral Daily With Suze Cope MD          Today, Patient Was Seen By: Ruby Elise PA-C    **Please Note: This note may have been constructed using a voice recognition system. **

## 2023-07-10 NOTE — PLAN OF CARE
Problem: PAIN - ADULT  Goal: Verbalizes/displays adequate comfort level or baseline comfort level  Description: Interventions:  - Encourage patient to monitor pain and request assistance  - Assess pain using appropriate pain scale  - Administer analgesics based on type and severity of pain and evaluate response  - Implement non-pharmacological measures as appropriate and evaluate response  - Consider cultural and social influences on pain and pain management  - Notify physician/advanced practitioner if interventions unsuccessful or patient reports new pain  Outcome: Progressing     Problem: INFECTION - ADULT  Goal: Absence or prevention of progression during hospitalization  Description: INTERVENTIONS:  - Assess and monitor for signs and symptoms of infection  - Monitor lab/diagnostic results  - Monitor all insertion sites, i.e. indwelling lines, tubes, and drains  - Monitor endotracheal if appropriate and nasal secretions for changes in amount and color  - [place] appropriate cooling/warming therapies per order  - Administer medications as ordered  - Instruct and encourage patient and family to use good hand hygiene technique  - Identify and instruct in appropriate isolation precautions for identified infection/condition  Outcome: Progressing  Goal: Absence of fever/infection during neutropenic period  Description: INTERVENTIONS:  - Monitor WBC    Outcome: Progressing     Problem: SAFETY ADULT  Goal: Patient will remain free of falls  Description: INTERVENTIONS:  - Educate patient/family on patient safety including physical limitations  - Instruct patient to call for assistance with activity   - Consult OT/PT to assist with strengthening/mobility   - Keep Call bell within reach  - Keep bed low and locked with side rails adjusted as appropriate  - Keep care items and personal belongings within reach  - Initiate and maintain comfort rounds  - Make Fall Risk Sign visible to staff  - Offer Toileting every 2 Hours, in advance of need  - Initiate/Maintain bed alarm  - Obtain necessary fall risk management equipment:   - Apply yellow socks and bracelet for high fall risk patients  - Consider moving patient to room near nurses station  Outcome: Progressing  Goal: Maintain or return to baseline ADL function  Description: INTERVENTIONS:  -  Assess patient's ability to carry out ADLs; assess patient's baseline for ADL function and identify physical deficits which impact ability to perform ADLs (bathing, care of mouth/teeth, toileting, grooming, dressing, etc.)  - Assess/evaluate cause of self-care deficits   - Assess range of motion  - Assess patient's mobility; develop plan if impaired  - Assess patient's need for assistive devices and provide as appropriate  - Encourage maximum independence but intervene and supervise when necessary  - Involve family in performance of ADLs  - Assess for home care needs following discharge   - Consider OT consult to assist with ADL evaluation and planning for discharge  - Provide patient education as appropriate  Outcome: Progressing  Goal: Maintains/Returns to pre admission functional level  Description: INTERVENTIONS:  - Perform BMAT or MOVE assessment daily.   - Set and communicate daily mobility goal to care team and patient/family/caregiver. - Collaborate with rehabilitation services on mobility goals if consulted  - Perform Range of Motion 3 times a day. - Reposition patient every 3 hours.   - Dangle patient 3 times a day  - Stand patient 3 times a day  - Ambulate patient 3 times a day  - Out of bed to chair 3 times a day   - Out of bed for meals 3 times a day  - Out of bed for toileting  - Record patient progress and toleration of activity level   Outcome: Progressing     Problem: DISCHARGE PLANNING  Goal: Discharge to home or other facility with appropriate resources  Description: INTERVENTIONS:  - Identify barriers to discharge w/patient and caregiver  - Arrange for needed discharge resources and transportation as appropriate  - Identify discharge learning needs (meds, wound care, etc.)  - Arrange for interpretive services to assist at discharge as needed  - Refer to Case Management Department for coordinating discharge planning if the patient needs post-hospital services based on physician/advanced practitioner order or complex needs related to functional status, cognitive ability, or social support system  Outcome: Progressing     Problem: Knowledge Deficit  Goal: Patient/family/caregiver demonstrates understanding of disease process, treatment plan, medications, and discharge instructions  Description: Complete learning assessment and assess knowledge base.   Interventions:  - Provide teaching at level of understanding  - Provide teaching via preferred learning methods  Outcome: Progressing     Problem: RESPIRATORY - ADULT  Goal: Achieves optimal ventilation and oxygenation  Description: INTERVENTIONS:  - Assess for changes in respiratory status  - Assess for changes in mentation and behavior  - Position to facilitate oxygenation and minimize respiratory effort  - Oxygen administered by appropriate delivery if ordered  - Initiate smoking cessation education as indicated  - Encourage broncho-pulmonary hygiene including cough, deep breathe, Incentive Spirometry  - Assess the need for suctioning and aspirate as needed  - Assess and instruct to report SOB or any respiratory difficulty  - Respiratory Therapy support as indicated  Outcome: Progressing

## 2023-07-10 NOTE — DISCHARGE INSTRUCTIONS
I have placed an ambulatory referral for you to follow-up with gastroenterology outpatient regarding your nausea symptoms. You were seen by cardiology while here who will set you up with a event monitor to make sure you are not going into any arrhythmia during syncopal episodes. If you have any recurrence of syncopal episodes please return to the emergency department. You should also follow-up with neurology outpatient for further work-up of possible neuropathy.

## 2023-07-10 NOTE — ASSESSMENT & PLAN NOTE
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  · Normal epicardial coronaries on cardiac cath in March 2023; it did show transient EKG ST/T wave changes suggestive of possible vasospasm  · Continue antianginal therapy with metoprolol tartrate 25 mg every 12 hours  · Continue statin  · Patient on nitroglycerin 0.4 mg sublingual as needed- pt does not use and free of anginal symptoms  · Prior to admission aspirin 81 mg was discontinued by her cardiologist due to stomach upset (aspirin 81 was for medical management of vasospastic angina versus micro vascular angina)  · Has denied any chest pain or palpitations prior to syncopal episodes

## 2023-07-10 NOTE — UTILIZATION REVIEW
Initial Clinical Review    Admission: Date/Time/Statement:   Admission Orders (From admission, onward)     Ordered        07/08/23 0003  INPATIENT ADMISSION  Once                      Orders Placed This Encounter   Procedures   • INPATIENT ADMISSION     Standing Status:   Standing     Number of Occurrences:   1     Order Specific Question:   Level of Care     Answer:   Med Surg [16]     Order Specific Question:   Estimated length of stay     Answer:   More than 2 Midnights     Order Specific Question:   Certification     Answer:   I certify that inpatient services are medically necessary for this patient for a duration of greater than two midnights. See H&P and MD Progress Notes for additional information about the patient's course of treatment. ED Arrival Information     Expected   -    Arrival   7/7/2023 20:18    Acuity   Urgent            Means of arrival   Walk-In    Escorted by   Spouse    Service   Hospitalist    Admission type   Emergency            Arrival complaint    syncope   nausea             Chief Complaint   Patient presents with   • Nausea     Patient complains of nausea that started yesterday with one episode of vomiting. She states that she "blacked out" at least 3 x since yesterday. Denies falling. She does report one episode of incontinence. Initial Presentation: 74 y.o. female from home to ED 7/7/23 and inpatient order placed 7/8/23 due to Syncope- vasovagal vs seizure vs vertebrobasilar insuffiencey vs orthostasis. Presented due to 3 syncopal episodes since evening prior to arrival, first sudden lightheadedness, sensation of bilateral tinnitus and lowered self to ground, spouse found on ground about 30 seconds later then 2 hours later on phone seated and had another syncopal episode lasting several seconds. + nausea, episode of urinary incontinence  and on day of arrival while seated, another syncopal episode lasting 30 seconds.   Poor intake on day of arrival.  Exam non focal. . Na 129 with basline of 133 - 134. TSH 6.813. In the ED given bolus of IVF x 2 . Plan is telemetry. Spot eeg to rule out seizure like activity. Cta head and neck. PT/OT. Echo. Trend BMP. Continue home medications. Date:  7/9/23    Day 2: no further syncope. On exam: no focal deficits. CTA did not demonstrate any acute vascular concerns that would cause VBI or syncope. Plan is spot eeg. Levothyroxine increased from 25 to 75 mcg daily. Check free T 4 level. Echo.   I    ED Triage Vitals   Temperature Pulse Respirations Blood Pressure SpO2   07/07/23 2048 07/07/23 2048 07/07/23 2048 07/07/23 2048 07/07/23 2048   98.1 °F (36.7 °C) 71 18 160/73 95 %      Temp Source Heart Rate Source Patient Position - Orthostatic VS BP Location FiO2 (%)   07/07/23 2048 07/07/23 2048 07/07/23 2048 07/07/23 2048 --   Oral Monitor Lying Left arm       Pain Score       07/08/23 0500       No Pain          Wt Readings from Last 1 Encounters:   07/08/23 52.6 kg (116 lb)     Additional Vital Signs:   07/10/23 07:13:21 98.1 °F (36.7 °C) 71 18 165/75 105 98 % -- --   07/09/23 2138 -- 72 -- 163/77 -- -- -- --   07/09/23 1454 98.6 °F (37 °C) 71 16 153/67 96 97 % -- --   07/09/23 07:38:30 97.9 °F (36.6 °C) 69 16 150/68 95 95 % -- --   07/08/23 22:31:13 97.9 °F (36.6 °C) 61 18 127/67 87 96 % -- --   07/08/23 2034 -- 64 -- 161/71 -- -- -- --   07/08/23 19:18:07 97.6 °F (36.4 °C) 68 18 164/71 102 94 % -- --   07/08/23 14:47:03 98 °F (36.7 °C) 73 -- 141/64 90 97 % -- --   07/08/23 12:05:41 -- 56 -- 144/65 91 98 % -- Standing for 3 minutes - Orthostatic VS   07/08/23 12:02:54 -- 68 -- 143/65 91 98 % -- Sitting - Orthostatic VS   07/08/23 11:59:36 -- 60 -- 134/58 83 98 % -- Lying - Orthostatic VS   07/08/23 11:32:44 -- 68 18 137/57 84 96 % None (Room air) Sitting   07/08/23 1000 -- 63 19 125/62 89 96 % -- --   07/08/23 0900 -- 70 20 136/61 88 97 % -- --   07/08/23 0851 -- 70 -- 171/67 Abnormal  -- -- -- --   07/08/23 0830 -- 72 23 Abnormal  171/67 Abnormal  97 95 % -- --   07/08/23 0800 -- 71 20 139/63 90 96 % None (Room air) Lying   07/08/23 0700 -- 63 20 144/64 92 97 % None (Room air) Lying   07/08/23 0500 -- 62 14 133/62 89 95 % None (Room air) Lying     Pertinent Labs/Diagnostic Test Results:   CTA head and neck with and without contrast   Final Result by Leopold Nares, MD (07/08 0930)   Addendum (preliminary) 1 of 1 by Leopold Nares, MD (07/08 0930)   ADDENDUM:      -------------------------      *      Incidental note is made of diastases of the anterior and posterior arches    of C1 which may be developmental or remote posttraumatic in nature. *      -------------------------         Final         1. No evidence for large vessel vascular occlusion or hemodynamically significant stenosis in the head and neck. No aneurysm identified. 2. Persistent left hypoglossal artery which is the primary supply of the posterior circulation. This is a normal variant. Rather diminutive native vertebral arteries bilaterally. 3. Atherosclerotic calcification of the cervical left internal carotid artery without hemodynamically significant stenosis noted. 4. Changes of mild microangiopathic disease without acute intracranial abnormality. The findings concur with the preliminary report provided by Dr. Perez White of [facility]. Workstation performed: [id number]         XR chest 1 view portable   ED Interpretation by Gina Viramontes PA-C (07/08 0000)   ED Interpretation: No acute cardiopulmonary disease. Final Result by Leopold Nares, MD (07/08 6589)      No acute cardiopulmonary disease.                   Workstation performed: [id number]           7/7/23 ecg ECG rate:  63     ECG rate assessment: normal     Rhythm:     Rhythm: sinus rhythm     Ectopy:     Ectopy: none     QRS:     QRS axis:  Normal   Conduction:     Conduction: normal     ST segments:     ST segments:  Normal   T waves:     T waves: normal    Results from last 7 days   Lab Units 07/10/23  0411 07/09/23  0438 07/08/23  0535 07/07/23  2134   WBC Thousand/uL 7.87 6.63 7.43 8.75   HEMOGLOBIN g/dL 12.6 11.6 11.2* 12.1   HEMATOCRIT % 37.0 35.0 33.6* 36.2   PLATELETS Thousands/uL 212 193 190 231   NEUTROS ABS Thousands/µL 4.24 3.54 5.12 6.12     Results from last 7 days   Lab Units 07/10/23  0411 07/09/23  0438 07/08/23  0535 07/07/23  2134   SODIUM mmol/L 139 140 137 129*   POTASSIUM mmol/L 3.5 3.7 3.7 3.5   CHLORIDE mmol/L 107 110* 108 97   CO2 mmol/L 28 26 27 26   ANION GAP mmol/L 4 4 2 6   BUN mg/dL 11 8 8 11   CREATININE mg/dL 0.55* 0.57* 0.56* 0.66   EGFR ml/min/1.73sq m 92 90 91 86   CALCIUM mg/dL 8.7 8.5 8.0* 8.5   MAGNESIUM mg/dL  --  2.0  --   --    PHOSPHORUS mg/dL  --  2.3  --   --      Results from last 7 days   Lab Units 07/10/23  0411 07/07/23  2134   AST U/L 42* 18   ALT U/L 23 13   ALK PHOS U/L 49 52   TOTAL PROTEIN g/dL 5.6* 5.9*   ALBUMIN g/dL 3.7 3.8   TOTAL BILIRUBIN mg/dL 0.58 0.66     Results from last 7 days   Lab Units 07/10/23  0411 07/09/23  0438 07/08/23  0535 07/07/23  2134   GLUCOSE RANDOM mg/dL 91 94 98 116     Results from last 7 days   Lab Units 07/08/23  0109 07/07/23  2346 07/07/23 2134   HS TNI 0HR ng/L  --   --  6   HS TNI 2HR ng/L  --  8  --    HSTNI D2 ng/L  --  2  --    HS TNI 4HR ng/L 6  --   --    HSTNI D4 ng/L 0  --   --      Results from last 7 days   Lab Units 07/07/23  2134   TSH 3RD GENERATON uIU/mL 6.813*     Results from last 7 days   Lab Units 07/07/23 2134   BNP pg/mL 449*     Results from last 7 days   Lab Units 07/07/23 2138   CLARITY UA  Clear   COLOR UA  Yellow   SPEC GRAV UA  1.015   PH UA  6.0   GLUCOSE UA mg/dl Negative   KETONES UA mg/dl Negative   BLOOD UA  Trace*   PROTEIN UA mg/dl Trace*   NITRITE UA  Negative   BILIRUBIN UA  Negative   UROBILINOGEN UA (BE) mg/dl <2.0   LEUKOCYTES UA  Trace*   WBC UA /hpf 0-1   RBC UA /hpf None Seen   BACTERIA UA /hpf Occasional   EPITHELIAL CELLS WET PREP /hpf Occasional       ED Treatment:   Medication Administration from 07/07/2023 2018 to 07/08/2023 1104       Date/Time Order Dose Route Action Comments     07/07/2023 2137 EDT sodium chloride 0.9 % bolus 500 mL 500 mL Intravenous New Bag --     07/08/2023 0828 EDT metroNIDAZOLE (METROGEL) 1 % gel 1 application. 1 application. Topical Given used from home     07/08/2023 0851 EDT metoprolol tartrate (LOPRESSOR) tablet 25 mg 25 mg Oral Given --     07/08/2023 0912 EDT fluticasone-vilanterol 200-25 mcg/actuation 1 puff 1 puff Inhalation Given --     07/08/2023 1958 EDT fluticasone (FLONASE) 50 mcg/act nasal spray 1 spray 1 spray Each Nare Given --     07/08/2023 0105 EDT multi-electrolyte (PLASMALYTE-A/ISOLYTE-S PH 7.4) IV solution 500 mL 500 mL Intravenous New Bag --     07/08/2023 0536 EDT levothyroxine tablet 75 mcg 75 mcg Oral Given --     07/08/2023 0848 EDT ondansetron (ZOFRAN) injection 4 mg 4 mg Intravenous Given --        Past Medical History:   Diagnosis Date   • Asthma    • Cataract    • Disease of thyroid gland    • GERD (gastroesophageal reflux disease)    • Hyperlipidemia    • Kidney stone    • Melanoma ([address]) 2022     Present on Admission:  • Mild persistent asthma      Admitting Diagnosis: Nausea [R11.0]  Syncope [R55]  Tinnitus of both ears [H93.13]  Age/Sex: 74 y.o. female  Admission Orders:  07/08/23 0003  Scheduled Medications:  atorvastatin, 40 mg, Oral, Daily With Dinner  cholecalciferol, 2,000 Units, Oral, Daily  fluticasone, 1 spray, Each Nare, BID  fluticasone-vilanterol, 1 puff, Inhalation, Daily  levothyroxine, 50 mcg, Oral, Early Morning  metoprolol tartrate, 25 mg, Oral, Q12H KELLY  metroNIDAZOLE, 1 application. , Topical, Daily  PreserVision AREDS 2, 1 capsule, Oral, BID after meals  sucralfate, 1 g, Oral, Daily With Lunch  sucralfate, 1 g, Oral, Daily With Dinner    fluticasone (FLONASE) 50 mcg/act nasal spray 1 spray  Dose: 1 spray  Freq: Daily (RESP) Route: EACH NARE  Start: 07/08/23 0800 End: 07/09/23 0931    sucralfate (CARAFATE) tablet 1 g  Dose: 1 g  Freq: 2 times daily Route: PO  Start: 07/08/23 0900 End: 07/09/23 0926    Continuous IV Infusions: none      PRN Meds:  albuterol, 2 puff, Inhalation, Q4H PRN x 1 7/9  glycerin-hypromellose-, 1 drop, Both Eyes, Q6H PRN  nitroglycerin, 0.4 mg, Sublingual, Q5 Min PRN  ondansetron, 4 mg, Intravenous, Q4H PRN x 1 7/8/23     Telemetry   Orthostatic BP    IP CONSULT TO NEUROLOGY    Network Utilization Review Department  ATTENTION: Please call with any questions or concerns to [phone] and carefully listen to the prompts so that you are directed to the right person. All voicemails are confidential.  [facility] all requests for admission clinical reviews, approved or denied determinations and any other requests to dedicated fax number below belonging to the campus where the patient is receiving treatment.  List of dedicated fax numbers for the Facilities:  [place] DENIALS (Administrative/Medical Necessity) [phone] 230[address] [facility] (Maternity/NICU/Pediatrics) [phone]   [address] [place] [address] Drive [phone]   [facility] [address] [facility] [phone] 1505 [facility] [address] [facility] [id number] [place] [address] [phone]   [address] [facility] 1300 [facility] [address] [facility] [phone]

## 2023-07-10 NOTE — ASSESSMENT & PLAN NOTE
· Patient had a syncopal episode that started on Thursday morning when she was in bed supine with her head on her pillow talking to a friend. She could feel the onset of blacked out vision and dizziness without the room spinning prior to losing consciousness. On Friday she had another episode of syncope with about 5-second realization that she was going to pass out she lowered herself to the ground and then lost consciousness at which point her  called 911. When patient woke up she told patient's  to cancel the 911 call. Later on Friday patient had another episode of syncope. During one of her syncopal episodes she had major urinary incontinence which caused secondary infection of her pants as well for Puddle nearby. Patient was both supine and sitting and lowered herself to the ground from walking around in the kitchen during each of these syncopal events and did not suddenly get up from sitting to standing position at any given moment. Does have history of vasovagal syncope as a younger woman. She does remember around 1990s having a syncopal episode where in patient was told that she was having jerking repetitive motion at that time.     · Appreciate neurology consult and recommendations  · Discussed with neurology, low suspicion for seizure   · Recommending outpatient follow-up for neuropathy evaluation  · Seen by cardiology  · Echocardiogram EF 70%, grade 1 diastolic dysfunction   · CTA head/neck: Diastases of the anterior and posterior arches of C1, no evidence of large vessel vascular occlusion or hemodynamically significant stenosis, aneurysm, mild microangiopathic disease without acute intracranial abnormality  · She denies any neck pain possibly developmental  Recommend outpatient follow-up with cardiology for Holter monitor  Return to the emergency department with any recurrence of syncopal episode  Also recommend GI outpatient evaluation given prodromal symptoms of nausea uses Zofran as needed at home

## 2023-07-10 NOTE — CONSULTS
Consultation - Neurology   [facility] 74 y.o. female MRN: [id number]  Unit/Bed#: -01 Encounter: [id number]    Assessment/Plan    * Syncope  Assessment & Plan  40-year-old female with history of hypothyroidism, asthma, hyperlipidemia, GERD, chronic tinnitus, history of stress incontinence, melanoma and cataracts. Patient also follows with neurology as an outpatient, the patient was last seen on 1/31/2023, it was reported she was initially seen for numbness/tingling in hands and right greater than left foot numbness and tingling that started in December 2018, reported she also had a negative EMG in the past   she also has a history of ocular migraines which is described as flashing arrow with a few minute duration. During her last counter with neurology she was also complaining of right shoulder pain rating from her neck and jaw, which PT was recommended. The patient presented to the hospital at [address] with syncopal episodes. CTA of the head and neck showed no significant stenosis or evidence of VBI, echo and EEG are pending. Vital signs are stable, electrolytes did show a hyponatremia on arrival.    Neurological examination as below, normal mentation, non focal neurological examination. · Syncope, syncopal episodes -suspect that these are not epileptic in nature/seizure. Need to rule out vasovagal/orthostasis, rule out cardiac etiology. No evidence on examination or by description or by imaging to suspect stroke or vertebrobasilar insufficiency. -Echo pending. -EEG pending  -CTA of head and neck completed, please see below for details  -No need for further imaging at this time.   -Check orthostatic blood pressure, monitor closely.    -Monitor for infectious metabolic derangement  -Telemetry  -Consider cardiology consult, may need longer term out patient monitoring.   -Neurological checks notify neurology with any changes in neurological exam or focal findings on neuro exam  -Please see attestation for further details  -She would like to follow with neurology as out patient, she would like to see [facility]. Sharita Acevedo will need follow up in in 4 weeks with neuromuscular attending or advance practitioner. She will not require outpatient neurological testing, at this time. History of Present Illness     Reason for Consult / Principal Problem:   Syncope    HPI: Sharita Acevedo is a 74 y.o. with history of hypothyroidism on levothyroxine, mild persistent asthma, hyperlipidemia, GERD, chronic tinnitus, history of stress incontinence, melanoma and cataracts. She does have a hx of vasovagal syncope in the past.     The patient presents to [facility] on Thursday morning, it was reported that she was in bed supine with her head on the pillow  When she syncopized and lost consciousness in the supine position, she felt a sensation of blocking her vision and dizziness without the room spinning prior to loss of consciousness. It is reported on Friday prior to admission she had another episode of syncope with about 5-second realization that she was going to pass out and lowered her self to the ground then lost consciousness which point her  called 911, the patient awoken and cancelled the 911 call. Report later on Friday the patient had another episode of syncope. The patient also endorses tinnitus which gets louder 5 to 10 seconds prior to the syncopal episode, reported each syncopal episode lasted about 30 seconds. The patient does follow with neurology at the [address] [place] [address] patient was last seen on 1/31/2023, reported she was initially seen for numbness/tingling in the hands and right greater than left foot numbness and tingling December 2018.   It is reported she had a negative EMG at [facility].    It is documented that she had a negative Lyme antibody,SPEP, B12 and MMA hemoglobin A1c and CRP was normal.     He reported the numbness extended to her calves of the summer 2020 that seem to resolve and now the numbness is from her ankles to her toes, she also has a spontaneous/Tory forces throughout the legs and hands. In addition she has a history of ocular migraine, which reported that time was less than 1 time a month, as a flashing arrow with a few minute duration. She also has a hx of right shoulder pain radiating to the right neck and jaw. An EMG was not ordered the patient was recommended to complete PT. It is  reported in regards to her distal bilateral numbness and tingling there was no change in neuro exam since 2018, no further recommendations. Her occular migraines have been controlled. Secondary to her syncope, medicine team felt that this may be secondary to vasovagal syncope versus seizure versus vestibular basilar insufficiency versus ortho stasis, spot EEG was ordered, this is pending. CTA showed no evidence of large vessel vascular occlusion or hemodynamic significant stenosis in the head or neck no aneurysm was identified, there was rather diminutive vertebral arteries bilaterally, there was atherosclerotic calcification of the cervical left internal carotid artery without hemodynamic significant stenosis. Neurology was asked to see and evaluate the patient. Signs and arrival, were stable blood pressure is 155/71 respirations were 20 pulse was 69. CBC was normal  CMP hyponatremia 129  BNP was 449  Tsh was 6.813, T4 was normal  UA was negative for any evidence of infection there was trace leukocytes  Troponins were normal    Most recent CBC is normal  CMP NA has improved. Per neurology interview -     The patient reports that she is right handed. The patient has never had a seizure in the past.  No one in the family has had seizures  No hx of meningitis, no hx of trauma to cause loc, no hx of stroke, no hx etoh use.    She denies any hx of illicit substances. Never had febrile convulsive's as a a child. She reports on Thursday, hx of vasovagal syncope in elementary school, with a needle she would pass out, this happened many times. She had events in the highschool. She reports she syncopic in an elevator in the past.     Last time she fainted was in her 45s. She reports recently, she thought she had thrush, she felt nausea from nystatin for possible thrush. She felt like she was going to faint, with this medications. A few months, ago a presyncopal feeling, in the bathroom. Sitting on sofa felt like she was going to faint, went to her PCP and was cleared. She has tinnitus on both sides, intensifies. Chronic. This magnifies, that her head feels lightheaded, like she is going to pass out. She will go to the floor, so she does not get hurt. It happens when she gets nauseous wit this. She has no correlation with blood pressures being low, she does check her blood pressure at home. Her blood pressures have been on the higher side. She had two episodes with the thrush. She also has multiple issues of dizziness which is different, from when she passes out this with nausea. Thursday morning, took Zofran for nausea. She was nauseous. She has seen GI in the past, not for nausea. Shortly after, she felt nausea. She did not have a coffee, which she has. Zofran helped with nausea, but than she got dizzy. She reports a couple hours later she was in the kitchen, similar with increased ringing ears, and lightheaded, sat to the floor. He had a syncopal episode, lasted for a minute. He called 911, she awoke on the floor. She reports when she awoken she was clear, with no postictal, no tongue bite, no bowel or bladder dysfunction. No difficulty breathing, no weakness or numbness on one side of body, no loss of vision on one side. She does follow with ENT. She does have hearing loss, she has had an audiogram done.   She does follow with ENT for tinnitus. The patient had another two events on Friday, the week prior to arrival.     The patient reports on Friday, she reports she was tired and not eating much, she was having liquids and clear broth. She was sitting on the sofa, she laid back and she doses off. She reports when she awoke, she was wet in her pants. She is not sure if she spilled her coffee, or if this was urinary incontinence. She came to the hospital, late Friday. She has no other syncopal episodes since Friday. Inpatient consult to Neurology  Consult performed by: Beronica Man PA-C  Consult ordered by: Yvette Garcia PA-C          Review of Systems     Historical Information   Past Medical History:   Diagnosis Date   • Asthma    • Cataract    • Disease of thyroid gland    • GERD (gastroesophageal reflux disease)    • Hyperlipidemia    • Kidney stone    • Melanoma ([address]) 2022     Past Surgical History:   Procedure Laterality Date   • CARDIAC CATHETERIZATION N/A 03/13/2023    Procedure: Cardiac Coronary Angiogram;  Surgeon: Samaria Reeves DO;  Location: [facility]; Service: Cardiology   • CARDIAC CATHETERIZATION  03/13/2023    Procedure: Cardiac catheterization;  Surgeon: Samaria Reeves DO;  Location: [facility]; Service: Cardiology   • CARDIAC CATHETERIZATION Left 03/13/2023    Procedure: Cardiac Left Heart Cath;  Surgeon: Samaria Reeves DO;  Location: [facility];   Service: Cardiology   • COLONOSCOPY     • ROTATOR CUFF REPAIR Right     6/2022   • THYROID SURGERY       Social History   Social History     Substance and Sexual Activity   Alcohol Use Never     Social History     Substance and Sexual Activity   Drug Use Never     E-Cigarette/Vaping   • E-Cigarette Use Never User      E-Cigarette/Vaping Substances   • Nicotine No    • THC No    • CBD No    • Flavoring No    • Other No    • Unknown No      Social History     Tobacco Use   Smoking Status Never   • Passive exposure: Past   Smokeless Tobacco Never     Family History:   Family History   Problem Relation Age of Onset   • Hypertension Father    • Cancer Maternal Aunt         lip cancer     Review of previous medical records was completed, please see HPI for details. Meds/Allergies   all current active meds have been reviewed, current meds:   Current Facility-Administered Medications   Medication Dose Route Frequency   • albuterol (PROVENTIL HFA,VENTOLIN HFA) inhaler 2 puff  2 puff Inhalation Q4H PRN   • atorvastatin (LIPITOR) tablet 40 mg  40 mg Oral Daily With Dinner   • cholecalciferol (VITAMIN D3) tablet 2,000 Units  2,000 Units Oral Daily   • enoxaparin (LOVENOX) subcutaneous injection 40 mg  40 mg Subcutaneous Daily   • fluticasone (FLONASE) 50 mcg/act nasal spray 1 spray  1 spray Each Nare BID   • fluticasone-vilanterol 200-25 mcg/actuation 1 puff  1 puff Inhalation Daily   • glycerin-hypromellose- (ARTIFICIAL TEARS) ophthalmic solution 1 drop  1 drop Both Eyes Q6H PRN   • levothyroxine tablet 50 mcg  50 mcg Oral Early Morning   • metoprolol tartrate (LOPRESSOR) tablet 25 mg  25 mg Oral Q12H [facility]   • metroNIDAZOLE (METROGEL) 1 % gel 1 application. 1 application. Topical Daily   • nitroglycerin (NITROSTAT) SL tablet 0.4 mg  0.4 mg Sublingual Q5 Min PRN   • ondansetron (ZOFRAN) injection 4 mg  4 mg Intravenous Q4H PRN   • PreserVision AREDS 2 CAPS 1 capsule  1 capsule Oral BID after meals   • sucralfate (CARAFATE) tablet 1 g  1 g Oral Daily With Lunch   • sucralfate (CARAFATE) tablet 1 g  1 g Oral Daily With Dinner    and PTA meds:   Prior to Admission Medications   Prescriptions Last Dose Informant Patient Reported? Taking?    Cholecalciferol 50 MCG (2000 UT) CAPS 7/7/2023 Self Yes Yes   Sig: Take 1 tablet by mouth daily   Multiple Vitamins-Minerals (PRESERVISION AREDS 2 PO) 7/7/2023 Self Yes Yes   Sig: Take 1 capsule by mouth 2 (two) times a day with meals   Peak Flow Meter NANDA Unknown Self Yes No Si Act by Does not apply route 2 (two) times a day   albuterol (PROVENTIL HFA,VENTOLIN HFA) 90 mcg/act inhaler 2023 Self No Yes   Sig: Inhale 2 puffs every 4 (four) hours as needed for shortness of breath   conjugated estrogens (PREMARIN) vaginal cream  Self Yes Yes   Sig: Insert 1 g into the vagina once a week   fluticasone (VERAMYST) 27.5 MCG/SPRAY nasal spray 2023 Self No Yes   Si spray into each nostril daily   Patient taking differently: 1 spray into each nostril 2 (two) times a day   fluticasone-vilanterol (Breo Ellipta) 200-25 mcg/actuation inhaler 2023  No Yes   Sig: Inhale 1 puff daily Rinse mouth after use.    levothyroxine 50 mcg tablet 2023 Self Yes Yes   Sig: Take 50 mcg by mouth daily in the early morning   metoprolol tartrate (LOPRESSOR) 25 mg tablet 2023 Self No Yes   Sig: Take 1 tablet (25 mg total) by mouth every 12 (twelve) hours   metroNIDAZOLE (METROGEL) 1 % gel Past Week Self Yes Yes   Sig: Apply 1 application topically daily   nitroglycerin (NITROSTAT) 0.4 mg SL tablet Not Taking Self No No   Sig: Place 1 tablet (0.4 mg total) under the tongue every 5 (five) minutes as needed for chest pain   Patient not taking: Reported on 2023   ondansetron (ZOFRAN) 4 mg tablet  Self Yes Yes   Sig: Take 4 mg by mouth every 8 (eight) hours as needed for nausea   polyethylene glycol-propylene glycol (SYSTANE) 0.4-0.3 % 2023 Self Yes Yes   Sig: Apply 1 drop to eye Three times a day   raloxifene (EVISTA) 60 mg tablet   Yes Yes   Sig: Take 60 mg by mouth daily   rosuvastatin (CRESTOR) 20 MG tablet 2023  No Yes   Sig: Take 1 tablet (20 mg total) by mouth daily   Patient taking differently: Take 20 mg by mouth daily with dinner   sucralfate (CARAFATE) 1 g tablet 2023 Self Yes Yes   Sig: Take 1 g by mouth daily with lunch One tablet with lunch   sucralfate (CARAFATE) 1 g tablet   Yes Yes   Sig: Take 500 g by mouth daily after dinner Half a tablet after dinner Facility-Administered Medications: None     Allergies   Allergen Reactions   • Amlodipine Edema     hands   • Amoxicillin Abdominal Pain, GI Intolerance and Nausea Only   • Clotrimazole Vomiting   • Cyclobenzaprine Nausea Only   • Naproxen Nausea Only   • Nystatin Nausea Only   • Other      Very sensitive to strong perfumes, sprays, smoke and exercise etc.  Recently dx with asthma   • Tramadol Nausea Only     Objective   Vitals:Blood pressure 163/77, pulse 72, temperature 98.1 °F (36.7 °C), resp. rate 18, height 5' 1" (1.549 m), weight 52.6 kg (116 lb), SpO2 98 %. ,Body mass index is 21.92 kg/m². Intake/Output Summary (Last 24 hours) at 7/10/2023 1219  Last data filed at 7/10/2023 1025  Gross per 24 hour   Intake 1050 ml   Output --   Net 1050 ml     Invasive Devices: Invasive Devices     Peripheral Intravenous Line  Duration           Peripheral IV 07/07/23 Right Antecubital 2 days              Vital signs reviewed  Constitutional - in NAD, pleasant and cooperative, sitting on the , in NAD  HEENT - NC/AT, no icterus noted, conjuctiva clear, oral mucosa free of exudate  Cardiac - No murmur noted, rate regular  Lungs - Clear to auscultation bilaterally, no wheezing noted. Abdomen - Soft and non tender, non distended  Extremities - No edema noted  Skin - no rashes noted  Neurological  Mental status - the patient is awake alert oriented x 3, with no evidence of aphasia or dysarthria, patient is able to follow simple commands, is able to follow complex commands. No paraphasic errors noted. She is able to read and repeat, she is a good historian. Cranial nerves 2 through 12 are intact  Motor - 5/5 upper extremities and lower extremities, without drift, normal tone and bulk  No tremor or fixed deficit noted  Rapid movements are equal bilaterally  Negative paulino's. Sensation - nonfocal to touch, non focal to pp.    Coordination - no ataxia noted on finger-to-nose or heel-to-shin  Reflexes are equal - symmetric 1-2 at the patellars. 1 in the arms. Toes are downgoing bilaterally  No evidence of clonus or myoclonus or tremor. No evidence of seizure activity  Gait is normal.     NIH is a zero. Lab Results:   I have personally reviewed pertinent reports. , CBC:   Results from last 7 days   Lab Units 07/10/23  0411 07/09/23  0438 07/08/23  0535   WBC Thousand/uL 7.87 6.63 7.43   RBC Million/uL 4.18 3.91 3.76*   HEMOGLOBIN g/dL 12.6 11.6 11.2*   HEMATOCRIT % 37.0 35.0 33.6*   MCV fL 89 90 89   PLATELETS Thousands/uL 212 193 190   , BMP/CMP:   Results from last 7 days   Lab Units 07/10/23  0411 07/09/23  0438 07/08/23  0535 07/07/23 2134   SODIUM mmol/L 139 140 137 129*   POTASSIUM mmol/L 3.5 3.7 3.7 3.5   CHLORIDE mmol/L 107 110* 108 97   CO2 mmol/L 28 26 27 26   BUN mg/dL 11 8 8 11   CREATININE mg/dL 0.55* 0.57* 0.56* 0.66   CALCIUM mg/dL 8.7 8.5 8.0* 8.5   AST U/L 42*  --   --  18   ALT U/L 23  --   --  13   ALK PHOS U/L 49  --   --  52   EGFR ml/min/1.73sq m 92 90 91 86   , Vitamin B12:   , HgBA1C:   , TSH:   Results from last 7 days   Lab Units 07/07/23  2134   TSH 3RD GENERATON uIU/mL 6.813*   , Coagulation:   , Lipid Profile:   , Ammonia:   , Urinalysis:   Results from last 7 days   Lab Units 07/07/23 2138   COLOR UA  Yellow   CLARITY UA  Clear   SPEC GRAV UA  1.015   PH UA  6.0   LEUKOCYTES UA  Trace*   NITRITE UA  Negative   GLUCOSE UA mg/dl Negative   KETONES UA mg/dl Negative   BILIRUBIN UA  Negative   BLOOD UA  Trace*   , Drug Screen:   , Medication Drug Levels:       Invalid input(s): "CARBAMAZEPINE", "LACOSAMIDE", "OXCARBAZEPINE"     Procedure: CTA head and neck with and without contrast    Addendum Date: 7/8/2023 Addendum:   ADDENDUM: ------------------------- * Incidental note is made of 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  * -------------------------     Result Date: 7/8/2023  Narrative: CTA NECK AND BRAIN WITH AND WITHOUT CONTRAST INDICATION: Syncope, recurrent Syncope and tinnitis;cta head and neck w wo: Patient complains of nausea that started yesterday with one episode of vomiting. She states that she "blacked out" at least 3 x since yesterday. Denies falling. She does report one episode of incontinence hx of thyroid surgery COMPARISON:   None. TECHNIQUE:  Routine CT imaging of the Brain without contrast.  Post contrast imaging was performed after administration of iodinated contrast through the neck and brain. Post contrast axial 0.625 mm images timed to opacify the arterial system. 3D rendering was performed on an independent workstation. MIP reconstructions performed. Coronal reconstructions were performed of the noncontrast portion of the brain. Radiation dose length product (DLP) for this visit:  1098 mGy-cm . This examination, like all CT scans performed in the [facility], was performed utilizing techniques to minimize radiation dose exposure, including the use of iterative reconstruction and automated exposure control. IV Contrast:  85 mL of iohexol (OMNIPAQUE) IMAGE QUALITY:   Diagnostic FINDINGS: Preliminary interpretation provided by [employer]. NONCONTRAST BRAIN PARENCHYMA: Decreased attenuation is noted in periventricular and subcortical white matter demonstrating an appearance that is statistically most likely to represent mild microangiopathic change. No CT signs of acute infarction. No intracranial mass, mass effect or midline shift. No acute parenchymal hemorrhage. VENTRICLES AND EXTRA-AXIAL SPACES: Ventricles and extra-axial CSF spaces are prominent commensurate with the degree of volume loss. No hydrocephalus. No acute extra-axial hemorrhage. VISUALIZED ORBITS: Normal visualized orbits. PARANASAL SINUSES: Normal visualized paranasal sinuses.  CERVICAL VASCULATURE AORTIC ARCH AND GREAT VESSELS: Common trunk of the innominate and left common carotid arteries with early bifurcation of the innominate artery noted. Aortic arch great vessel origins are widely patent. RIGHT VERTEBRAL ARTERY CERVICAL SEGMENT:  Normal origin. The vessel is normal in caliber throughout the neck. LEFT VERTEBRAL ARTERY CERVICAL SEGMENT:  Normal origin. The vessel is diminutive in caliber throughout the neck. RIGHT EXTRACRANIAL CAROTID SEGMENT:  Normal caliber common carotid artery. Normal bifurcation and cervical internal carotid artery. No stenosis or dissection. LEFT EXTRACRANIAL CAROTID SEGMENT:  Normal caliber common carotid artery. Normal bifurcation and cervical internal carotid artery. No stenosis or dissection. Mild atherosclerotic calcification of the proximal and mid cervical internal carotid arteries noted without hemodynamically significant stenosis identified. There is bifurcation of the left cervical internal carotid artery at the C2-3 level yielding a dominant persistent hypoglossal artery which is a normal variant, and which is the primary supply to the posterior circulation. NASCET criteria was used to determine the degree of internal carotid artery diameter stenosis. INTRACRANIAL VASCULATURE INTERNAL CAROTID ARTERIES:  Normal enhancement of the intracranial portions of the internal carotid arteries. Normal ophthalmic artery origins. Normal ICA terminus. ANTERIOR CIRCULATION:  Symmetric A1 segments and anterior cerebral arteries with normal enhancement. Normal anterior communicating artery. MIDDLE CEREBRAL ARTERY CIRCULATION:  M1 segment and middle cerebral artery branches demonstrate normal enhancement bilaterally. DISTAL VERTEBRAL ARTERIES: Distal right vertebral artery is rather diminutive as is the distal left vertebral artery which appears to terminate at the PICA. Posterior circulation provided predominantly via a persistent left hypoglossal artery, a normal variant. This vessel appears to unite with the contralateral diminutive distal right vertebral artery forming a normal size basilar artery. BASILAR ARTERY:  Basilar artery is normal in caliber. Normal superior cerebellar arteries. POSTERIOR CEREBRAL ARTERIES: Both posterior cerebral arteries arises from the basilar tip. Both arteries demonstrate normal enhancement. Small hypoplastic posterior communicating arteries bilaterally. VENOUS STRUCTURES:  Normal. NON VASCULAR ANATOMY BONY STRUCTURES:  No acute osseous abnormality. SOFT TISSUES OF THE NECK: Status post left hemithyroidectomy. THORACIC INLET:  Normal.     Impression: 1. No evidence for large vessel vascular occlusion or hemodynamically significant stenosis in the head and neck. No aneurysm identified. 2. Persistent left hypoglossal artery which is the primary supply of the posterior circulation. This is a normal variant. Rather diminutive native vertebral arteries bilaterally. 3. Atherosclerotic calcification of the cervical left internal carotid artery without hemodynamically significant stenosis noted. 4. Changes of mild microangiopathic disease without acute intracranial abnormality. The findings concur with the preliminary report provided by Dr. Мария Lopez of [facility]. Workstation performed: [id number]     Procedure: XR chest 1 view portable    Result Date: 7/8/2023  Narrative: CHEST INDICATION:   syncope. COMPARISON: 2/27/2023 EXAM PERFORMED/VIEWS:  XR CHEST PORTABLE FINDINGS: Cardiomediastinal silhouette appears unremarkable. Scar or subsegmental atelectasis in both mid lower lung zones with otherwise clear lungs. No pneumothorax or pleural effusion. Osseous structures appear within normal limits for patient age. Impression: No acute cardiopulmonary disease. Workstation performed: [id number]     Imaging Studies: I have personally reviewed pertinent reports. EKG, Pathology, and Other Studies: I have personally reviewed pertinent reports.       Code Status: Level 1 - Full Code    Counseling / Coordination of Care  Reviewed case with neurology attending, history and physical examination, labs and imaging completed, plan of care as per attending physician. Please see attestation for further details. Examined alongside attending physician. Please see attestation for details.

## 2023-07-10 NOTE — OCCUPATIONAL THERAPY NOTE
Occupational Therapy Screen    Patient Name: Janie Lundy  QFURC'F Date: 7/10/2023       07/10/23 1047   OT Last Visit   OT Visit Date 07/10/23   Note Type   Note type Screen   Additional Comments OT orders received and chart reviewed. Per chart, pt is independent. Spoke to RN who states pt is currently independent in room. Recommend pt continue to be OOB for meals, ambulation to/from BR, perform self care tasks, and mobility in hallway with nursing. At this time, OT recommendations at time of discharge are return home at [facility]. No acute OT needs identified at this time; please re-consult if OT needs arise during remainder of hospital stay.      Produce Run, MS, OTR/L

## 2023-07-10 NOTE — PLAN OF CARE
Problem: PAIN - ADULT  Goal: Verbalizes/displays adequate comfort level or baseline comfort level  Description: Interventions:  - Encourage patient to monitor pain and request assistance  - Assess pain using appropriate pain scale  - Administer analgesics based on type and severity of pain and evaluate response  - Implement non-pharmacological measures as appropriate and evaluate response  - Consider cultural and social influences on pain and pain management  - Notify physician/advanced practitioner if interventions unsuccessful or patient reports new pain  Outcome: Progressing     Problem: INFECTION - ADULT  Goal: Absence or prevention of progression during hospitalization  Description: INTERVENTIONS:  - Assess and monitor for signs and symptoms of infection  - Monitor lab/diagnostic results  - Monitor all insertion sites, i.e. indwelling lines, tubes, and drains  - Monitor endotracheal if appropriate and nasal secretions for changes in amount and color  - [place] appropriate cooling/warming therapies per order  - Administer medications as ordered  - Instruct and encourage patient and family to use good hand hygiene technique  - Identify and instruct in appropriate isolation precautions for identified infection/condition  Outcome: Progressing  Goal: Absence of fever/infection during neutropenic period  Description: INTERVENTIONS:  - Monitor WBC    Outcome: Progressing     Problem: SAFETY ADULT  Goal: Patient will remain free of falls  Description: INTERVENTIONS:  - Educate patient/family on patient safety including physical limitations  - Instruct patient to call for assistance with activity   - Consult OT/PT to assist with strengthening/mobility   - Keep Call bell within reach  - Keep bed low and locked with side rails adjusted as appropriate  - Keep care items and personal belongings within reach  - Initiate and maintain comfort rounds  - Make Fall Risk Sign visible to staff  - Offer Toileting every 2 Hours, in advance of need  - Initiate/Maintain bed alarm  - Obtain necessary fall risk management equipment:   - Apply yellow socks and bracelet for high fall risk patients  - Consider moving patient to room near nurses station  Outcome: Progressing  Goal: Maintain or return to baseline ADL function  Description: INTERVENTIONS:  -  Assess patient's ability to carry out ADLs; assess patient's baseline for ADL function and identify physical deficits which impact ability to perform ADLs (bathing, care of mouth/teeth, toileting, grooming, dressing, etc.)  - Assess/evaluate cause of self-care deficits   - Assess range of motion  - Assess patient's mobility; develop plan if impaired  - Assess patient's need for assistive devices and provide as appropriate  - Encourage maximum independence but intervene and supervise when necessary  - Involve family in performance of ADLs  - Assess for home care needs following discharge   - Consider OT consult to assist with ADL evaluation and planning for discharge  - Provide patient education as appropriate  Outcome: Progressing  Goal: Maintains/Returns to pre admission functional level  Description: INTERVENTIONS:  - Perform BMAT or MOVE assessment daily.   - Set and communicate daily mobility goal to care team and patient/family/caregiver. - Collaborate with rehabilitation services on mobility goals if consulted  - Perform Range of Motion 3 times a day. - Reposition patient every 3 hours.   - Dangle patient 3 times a day  - Stand patient 3 times a day  - Ambulate patient 3 times a day  - Out of bed to chair 3 times a day   - Out of bed for meals 3 times a day  - Out of bed for toileting  - Record patient progress and toleration of activity level   Outcome: Progressing     Problem: DISCHARGE PLANNING  Goal: Discharge to home or other facility with appropriate resources  Description: INTERVENTIONS:  - Identify barriers to discharge w/patient and caregiver  - Arrange for needed discharge resources and transportation as appropriate  - Identify discharge learning needs (meds, wound care, etc.)  - Arrange for interpretive services to assist at discharge as needed  - Refer to Case Management Department for coordinating discharge planning if the patient needs post-hospital services based on physician/advanced practitioner order or complex needs related to functional status, cognitive ability, or social support system  Outcome: Progressing     Problem: Knowledge Deficit  Goal: Patient/family/caregiver demonstrates understanding of disease process, treatment plan, medications, and discharge instructions  Description: Complete learning assessment and assess knowledge base.   Interventions:  - Provide teaching at level of understanding  - Provide teaching via preferred learning methods  Outcome: Progressing     Problem: RESPIRATORY - ADULT  Goal: Achieves optimal ventilation and oxygenation  Description: INTERVENTIONS:  - Assess for changes in respiratory status  - Assess for changes in mentation and behavior  - Position to facilitate oxygenation and minimize respiratory effort  - Oxygen administered by appropriate delivery if ordered  - Initiate smoking cessation education as indicated  - Encourage broncho-pulmonary hygiene including cough, deep breathe, Incentive Spirometry  - Assess the need for suctioning and aspirate as needed  - Assess and instruct to report SOB or any respiratory difficulty  - Respiratory Therapy support as indicated  Outcome: Progressing

## 2023-07-10 NOTE — CASE MANAGEMENT
Case Management Progress Note    Patient name Lotus Mistry /-01 MRN [id number]  : 1950 Date 7/10/2023       LOS (days): 2  Geometric Mean LOS (GMLOS) (days):   Days to [facility]:        OBJECTIVE:     Current admission status: Inpatient  Preferred Pharmacy:   [address], [address] [facility] [address] [facility]  [address] [facility] [address]  Phone: [phone] Fax: [phone]    [facility]  [address] [facility] [address]  [address] [address]  Phone: [phone] Fax: [address] [place], [address] [facility] [address]  Phone: [phone] Fax: [phone]    Primary Care Provider: Pelon Robins DO    Primary Insurance: [address]  Secondary Insurance: MEDICARE    PROGRESS NOTE:    Cm met with pt. She is ind and presents with no d/c needs. Her family will  her up later this evening.

## 2023-07-10 NOTE — ASSESSMENT & PLAN NOTE
Appears to have chronic hyponatremia with baseline of 133-1 34 from the past 2 to 3 years.   Sodium stable

## 2023-07-10 NOTE — PHYSICAL THERAPY NOTE
PHYSICAL THERAPY SCREEN NOTE    Patient Name: Zaire PITTY Date: 7/10/2023       07/10/23 1044   PT Last Visit   PT Visit Date 07/10/23   Note Type   Note type Screen   Additional Comments PT orders received, chart review performed. Spoke to Pratik Rossi who reports pt has been independently ambulating. Pt documented by RN yesterday to be independent.  Pt w/ no acute PT needs, will DC PT       Harrison Vides, PT, DPT

## 2023-07-10 NOTE — NURSING NOTE
All discharge paperwork, medications and follow-up appointments reviewed with patient and family bedside. Both verbalized understanding.

## 2023-07-10 NOTE — ASSESSMENT & PLAN NOTE
· Patient has a history of stress incontinence which occurs when she coughs hard and has a small volume of incontinence. However large incontinence episode with loss of consciousness which saturated her pants and created a puddle.   · EEG pending  · Neurology consulted

## 2023-07-10 NOTE — ASSESSMENT & PLAN NOTE
· Patient had a syncopal episode that started on Thursday morning when she was in bed supine with her head on her pillow talking to a friend. She could feel the onset of blacked out vision and dizziness without the room spinning prior to losing consciousness. On Friday she had another episode of syncope with about 5-second realization that she was going to pass out she lowered herself to the ground and then lost consciousness at which point her  called 911. When patient woke up she told patient's  to cancel the 911 call. Later on Friday patient had another episode of syncope. During one of her syncopal episodes she had major urinary incontinence which caused secondary infection of her pants as well for Puddle nearby. Patient was both supine and sitting and lowered herself to the ground from walking around in the kitchen during each of these syncopal events and did not suddenly get up from sitting to standing position at any given moment. Does have history of vasovagal syncope as a younger woman. She does remember around 1990s having a syncopal episode where in patient was told that she was having jerking repetitive motion at that time.     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

## 2023-07-10 NOTE — ASSESSMENT & PLAN NOTE
History of chronic tinnitus. Denies any pulsatile tinnitus. Tinnitus gets worse prior to syncope and loss of consciousness. CTA did not demonstrate any acute vascular concerns that would cause VBI or syncope.

## 2023-07-10 NOTE — PLAN OF CARE
Problem: PAIN - ADULT  Goal: Verbalizes/displays adequate comfort level or baseline comfort level  Description: Interventions:  - Encourage patient to monitor pain and request assistance  - Assess pain using appropriate pain scale  - Administer analgesics based on type and severity of pain and evaluate response  - Implement non-pharmacological measures as appropriate and evaluate response  - Consider cultural and social influences on pain and pain management  - Notify physician/advanced practitioner if interventions unsuccessful or patient reports new pain  Outcome: Progressing     Problem: INFECTION - ADULT  Goal: Absence or prevention of progression during hospitalization  Description: INTERVENTIONS:  - Assess and monitor for signs and symptoms of infection  - Monitor lab/diagnostic results  - Monitor all insertion sites, i.e. indwelling lines, tubes, and drains  - Monitor endotracheal if appropriate and nasal secretions for changes in amount and color  - [place] appropriate cooling/warming therapies per order  - Administer medications as ordered  - Instruct and encourage patient and family to use good hand hygiene technique  - Identify and instruct in appropriate isolation precautions for identified infection/condition  Outcome: Progressing  Goal: Absence of fever/infection during neutropenic period  Description: INTERVENTIONS:  - Monitor WBC    Outcome: Progressing     Problem: SAFETY ADULT  Goal: Patient will remain free of falls  Description: INTERVENTIONS:  - Educate patient/family on patient safety including physical limitations  - Instruct patient to call for assistance with activity   - Consult OT/PT to assist with strengthening/mobility   - Keep Call bell within reach  - Keep bed low and locked with side rails adjusted as appropriate  - Keep care items and personal belongings within reach  - Initiate and maintain comfort rounds  - Make Fall Risk Sign visible to staff  - Offer Toileting every 2 Hours, in advance of need  - Initiate/Maintain bed alarm  - Obtain necessary fall risk management equipment:   - Apply yellow socks and bracelet for high fall risk patients  - Consider moving patient to room near nurses station  Outcome: Progressing  Goal: Maintain or return to baseline ADL function  Description: INTERVENTIONS:  -  Assess patient's ability to carry out ADLs; assess patient's baseline for ADL function and identify physical deficits which impact ability to perform ADLs (bathing, care of mouth/teeth, toileting, grooming, dressing, etc.)  - Assess/evaluate cause of self-care deficits   - Assess range of motion  - Assess patient's mobility; develop plan if impaired  - Assess patient's need for assistive devices and provide as appropriate  - Encourage maximum independence but intervene and supervise when necessary  - Involve family in performance of ADLs  - Assess for home care needs following discharge   - Consider OT consult to assist with ADL evaluation and planning for discharge  - Provide patient education as appropriate  Outcome: Progressing  Goal: Maintains/Returns to pre admission functional level  Description: INTERVENTIONS:  - Perform BMAT or MOVE assessment daily.   - Set and communicate daily mobility goal to care team and patient/family/caregiver. - Collaborate with rehabilitation services on mobility goals if consulted  - Perform Range of Motion 3 times a day. - Reposition patient every 3 hours.   - Dangle patient 3 times a day  - Stand patient 3 times a day  - Ambulate patient 3 times a day  - Out of bed to chair 3 times a day   - Out of bed for meals 3 times a day  - Out of bed for toileting  - Record patient progress and toleration of activity level   Outcome: Progressing     Problem: DISCHARGE PLANNING  Goal: Discharge to home or other facility with appropriate resources  Description: INTERVENTIONS:  - Identify barriers to discharge w/patient and caregiver  - Arrange for needed discharge resources and transportation as appropriate  - Identify discharge learning needs (meds, wound care, etc.)  - Arrange for interpretive services to assist at discharge as needed  - Refer to Case Management Department for coordinating discharge planning if the patient needs post-hospital services based on physician/advanced practitioner order or complex needs related to functional status, cognitive ability, or social support system  Outcome: Progressing     Problem: Knowledge Deficit  Goal: Patient/family/caregiver demonstrates understanding of disease process, treatment plan, medications, and discharge instructions  Description: Complete learning assessment and assess knowledge base.   Interventions:  - Provide teaching at level of understanding  - Provide teaching via preferred learning methods  Outcome: Progressing     Problem: RESPIRATORY - ADULT  Goal: Achieves optimal ventilation and oxygenation  Description: INTERVENTIONS:  - Assess for changes in respiratory status  - Assess for changes in mentation and behavior  - Position to facilitate oxygenation and minimize respiratory effort  - Oxygen administered by appropriate delivery if ordered  - Initiate smoking cessation education as indicated  - Encourage broncho-pulmonary hygiene including cough, deep breathe, Incentive Spirometry  - Assess the need for suctioning and aspirate as needed  - Assess and instruct to report SOB or any respiratory difficulty  - Respiratory Therapy support as indicated  Outcome: Progressing

## 2023-07-10 NOTE — ASSESSMENT & PLAN NOTE
64-year-old female with history of hypothyroidism, asthma, hyperlipidemia, GERD, chronic tinnitus, history of stress incontinence, melanoma and cataracts. Patient also follows with neurology as an outpatient, the patient was last seen on 1/31/2023, it was reported she was initially seen for numbness/tingling in hands and right greater than left foot numbness and tingling that started in December 2018, reported she also had a negative EMG in the past   she also has a history of ocular migraines which is described as flashing arrow with a few minute duration. During her last counter with neurology she was also complaining of right shoulder pain rating from her neck and jaw, which PT was recommended. The patient presented to the hospital at [address] with syncopal episodes. CTA of the head and neck showed no significant stenosis or evidence of VBI, echo and EEG are pending. Vital signs are stable, electrolytes did show a hyponatremia on arrival.    Neurological examination as below, normal mentation, non focal neurological examination. · Syncope, syncopal episodes -suspect that these are not epileptic in nature/seizure. Need to rule out vasovagal/orthostasis, rule out cardiac etiology. No evidence on examination or by description or by imaging to suspect stroke or vertebrobasilar insufficiency. -Echo pending. -EEG pending  -CTA of head and neck completed, please see below for details  -No need for further imaging at this time.   -Check orthostatic blood pressure, monitor closely. -Monitor for infectious metabolic derangement  -Telemetry  -Consider cardiology consult, may need longer term out patient monitoring.   -Neurological checks notify neurology with any changes in neurological exam or focal findings on neuro exam  -Please see attestation for further details  -She would like to follow with neurology as out patient, she would like to see [facility].

## 2023-07-10 NOTE — ASSESSMENT & PLAN NOTE
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  · Normal epicardial coronaries on cardiac cath in March 2023; it did show transient EKG ST/T wave changes suggestive of possible vasospasm  · Continue antianginal therapy with metoprolol tartrate 25 mg every 12 hours  · Continue statin  · Patient on nitroglycerin 0.4 mg sublingual as needed- pt does not use and free of anginal symptoms  · Prior to admission aspirin 81 mg was discontinued by her cardiologist due to stomach upset (aspirin 81 was for medical management of vasospastic angina versus micro vascular angina)  · Has denied any chest pain or palpitations prior to syncopal episodes  · Appreciate cardiology consult while here  · Echo reviewed: EF 32%, grade 1 diastolic dysfunction, mild AR, moderate MR, mild to moderate TR, mild UT, pulmonary artery systolic pressure is upper normal  · Recommend outpatient event monitor  · Follow-up with cardiology

## 2023-07-11 NOTE — UTILIZATION REVIEW
NOTIFICATION OF ADMISSION DISCHARGE   This is a Notification of Discharge from [address] [facility]. Please be advised that this patient has been discharge from our facility. Below you will find the admission and discharge date and time including the patient’s disposition. UTILIZATION REVIEW CONTACT:  Marisela Barraza  Utilization   Network Utilization Review Department  Phone: [phone] carefully listen to the prompts. All voicemails are confidential.  Email: [email or web address]     ADMISSION INFORMATION  PRESENTATION DATE: 7/7/2023  8:42 PM  OBERVATION ADMISSION DATE:  INPATIENT ADMISSION DATE: 7/8/23 12:03 AM   DISCHARGE DATE: 7/10/2023  6:16 PM   DISPOSITION:Home/Self Care    IMPORTANT INFORMATION:  Send all requests for admission clinical reviews, approved or denied determinations and any other requests to dedicated fax number below belonging to the campus where the patient is receiving treatment.  List of dedicated fax numbers:  [place] DENIALS (Administrative/Medical Necessity) [phone] [address] [facility] (Maternity/NICU/Pediatrics) [phone]   [facility] [phone]   [facility] [phone] [address] [phone]   [address] [place] [address] [phone]   [facility] [phone]   [address] [place] [address] [address] [place] [address] [phone]   [address] [place] [address] [phone]   [address] [facility] [phone]   [address] [facility] [address] [phone]

## 2023-07-11 NOTE — CASE MANAGEMENT
Case Management Progress Note    Patient name Tri Jackson  Location /-01 MRN [id number]  : 1950 Date 2023       LOS (days): 2  Geometric Mean LOS (GMLOS) (days):   Days to [facility]:        OBJECTIVE:     Current admission status: Inpatient  Preferred Pharmacy:   [address], [address] [facility] [address] [facility]  [address] [facility] [address]  Phone: [phone] Fax: [phone]    [facility]  [address] [facility] [address].  [address] [facility] [address] [address]  Phone: [phone] Fax: [address] [place], [address] [facility] [address]  Phone: [phone] Fax: [phone]    Primary Care Provider: Shannon Izaguirre DO    Primary Insurance: [address]  Secondary Insurance: MEDICARE    PROGRESS NOTE:    Notification made to OP CM Handoff: [facility] OP CM regarding discharge planning and disposition.

## 2023-08-10 ENCOUNTER — CONSULT (OUTPATIENT)
Dept: GASTROENTEROLOGY | Facility: CLINIC | Age: 73
End: 2023-08-10
Payer: MEDICARE

## 2023-08-10 VITALS
SYSTOLIC BLOOD PRESSURE: 100 MMHG | WEIGHT: 115 LBS | DIASTOLIC BLOOD PRESSURE: 60 MMHG | BODY MASS INDEX: 21.71 KG/M2 | HEIGHT: 61 IN

## 2023-08-10 DIAGNOSIS — R11.0 NAUSEA: ICD-10-CM

## 2023-08-10 DIAGNOSIS — R55 VASOVAGAL SYNCOPE: Primary | ICD-10-CM

## 2023-08-10 DIAGNOSIS — R11.10 REGURGITATION OF FOOD: ICD-10-CM

## 2023-08-10 PROCEDURE — 99204 OFFICE O/P NEW MOD 45 MIN: CPT | Performed by: PHYSICIAN ASSISTANT

## 2023-08-10 NOTE — PATIENT INSTRUCTIONS
-Continue sucralfate  -continue zofran prn  - lower self to ground of feel lightheaded  - follow with Neurology and cardiology  - keep appt with [facility]

## 2023-08-10 NOTE — PROGRESS NOTES
[address] [facility] - Outpatient Consultation  Lotus Rodriguez 74 y.o. female MRN: [id number]  Encounter: [id number]    ASSESSMENT AND PLAN:      1. Nausea  2. Regurgitation of food  3. Vasovagal syncope  Pt admitted to [address] [place] [address] July 2023 for syncope x 3 and nausea and hiccups. EVal by Neuro and cardiology. thought to be vasovagal. CTA head/neck without occlusion stenosis or aneurysm. Discharged on Zofran. Advised to see GI. Came here as her outpt GI and [facility] did not have appointment until 10/2023. Prior to admission patient not feeling well, not eating and drinking much due to the nausea and had hiccups. Suspect she had an infectious gastroenteritis  Causing the nausea as well as her GI at [facility] lowering her sucralfate dose. Fortunately symptoms have not recurred. We suspect the nausea is more of a symptom of her vasovagal episodes rather than the trigger. Antireflux regiment reviewed. Advised to eat small frequent meals and avoid vasovagal syncope triggers. - Ambulatory Referral to Gastroenterology  -cont zofran prn nausea  -cont sucralfate  -keep follow up with [facility]  - keep follow up with cardiology and neurology      Case discussed with Dr. Sharla Rolon who agrees with the above dictation and plan. Follow up Appointment: prn    Chief Complaint   Patient presents with   • Follow-up     Hospital July 7-10th, had nausea, fainted three times w/in two days, had hiccups       HPI:     Lotus Rodriguez is a 74y.o. year old female with a PMH significant for thyroidism, asthma, CAD, angina, tinnitus, ocular migraines being evaluated at the request of Rhoda Diaz for nausea and syncope. Patient actually has had an extensive GI history followed at [facility]. She called their office for an appointment but could not get to October so decided to come here.     Patient follows with [facility] was seen June 2023 by Dr. German Crowley was lowering dose of sucralfate and wanted to start baclofen. Last endoscopy April 2019 showed a small hiatal hernia, gastritis negative for H. Pylori/intestinal metaplasia fundic gland polyps small bowel biopsy normal.  Colonoscopy September 2020 hyperplastic polyp and hemorrhoids recommend repeat in 5 years given history of adenoma. 2022 24-hour pH impedance on Nexium showed appropriate acid suppression no correlation between symptoms and reflux. Pt admitted to [facility] July 2023 for syncope x 3 and nausea and hiccups. EVal by Neuro and cardiology. thought to be vasovagal. CTA head/neck without occlusion stenosis or aneurysm. Discharged on Zofran. Advised to see GI.    7/2023 CMP, CBC WNL. TSH high but T4 WNL. Patient reports prior to hospital admission she was not feeling well and had some persistent nausea without vomiting. She was drinking liquids and eating very little mostly bananas. Normally she gets nauseous about once a month with taking certain medications improved with Zofran as needed. Also had some persistent hiccups. She feels herself about to faint and lowered herself to the ground. This has happened all her life but was more frequent prior to hospitalization. She came here because she wanted to see if there was a emergent link between her nausea and syncope as she cannot get an expedited appointment with her normal GI at [facility]. Since hospitalization she has had no recurrent nausea hiccups or syncope. She is having regular BM's without bleeding. Eating well and weight stable. Her nighttime regurgitation is controlled with her Carafate. She denies NSAID use. Does have tinnitus and ocular migraines but denies that these issues were precipitating her nausea or syncope. Denies vertigo. Pt denies all other GI and constitutional symptoms. ROS:   Constitutional: denies fatigue, fever. HEENT: denies visual disturbance, postnasal drip, sore throat. Respiratory: denies cough, + shortness of breath.   Cardiovascular: denies chest pain, leg swelling. Gastrointestinal: as noted above in HPI. : denies difficulty urinating, dysuria. Musculoskeletal: denies arthralgias, back pain. Neurological: denies dizziness, syncope. Psychiatric: denies confusion, anxiety. Historical Information   Past Medical History:   Diagnosis Date   • Asthma    • Cancer ([address] [facility]) Thyorid & Melanoma   • Cataract    • Coronary artery disease    • Disease of thyroid gland    • GERD (gastroesophageal reflux disease)    • Hernia    • Hyperlipidemia    • Kidney stone    • Melanoma ([address] [facility]) 2022     Past Surgical History:   Procedure Laterality Date   • CARDIAC CATHETERIZATION N/A 03/13/2023    Procedure: Cardiac Coronary Angiogram;  Surgeon: Car Bustamante DO;  Location: [facility] CARDIAC CATH LAB; Service: Cardiology   • CARDIAC CATHETERIZATION  03/13/2023    Procedure: Cardiac catheterization;  Surgeon: Car Bustamante DO;  Location: [facility] CARDIAC C[facility]; Service: Cardiology   • CARDIAC CATHETERIZATION Left 03/13/2023    Procedure: Cardiac Left Heart Cath;  Surgeon: Car Bustamante DO;  Location: [facility] CARDIAC [facility];   Service: Cardiology   • COLONOSCOPY     • COLONOSCOPY  9/20/20   • ROTATOR CUFF REPAIR Right     6/2022   • THYROID SURGERY     • UPPER GASTROINTESTINAL ENDOSCOPY  4/11/19     Social History     Substance and Sexual Activity   Alcohol Use Not Currently     Social History     Substance and Sexual Activity   Drug Use Never     Social History     Tobacco Use   Smoking Status Never   • Passive exposure: Past   Smokeless Tobacco Never     Family History   Problem Relation Age of Onset   • Hypertension Father    • Early death Father         age 43   • Cancer Maternal Aunt         lip cancer   • Vision loss Maternal Aunt    • Early death Mother         age 61       Meds/Allergies     Current Outpatient Medications:   •  albuterol (PROVENTIL HFA,VENTOLIN HFA) 90 mcg/act inhaler  •  Cholecalciferol 50 MCG (2000 UT) CAPS  •  fluticasone (VERAMYST) 27.5 MCG/SPRAY nasal spray  •  fluticasone-vilanterol (Breo Ellipta) 200-25 mcg/actuation inhaler  •  levothyroxine 50 mcg tablet  •  metoprolol tartrate (LOPRESSOR) 25 mg tablet  •  metroNIDAZOLE (METROGEL) 1 % gel  •  Multiple Vitamins-Minerals (PRESERVISION AREDS 2 PO)  •  nitroglycerin (NITROSTAT) 0.4 mg SL tablet  •  Peak Flow Meter NANDA  •  polyethylene glycol-propylene glycol (SYSTANE) 0.4-0.3 %  •  raloxifene (EVISTA) 60 mg tablet  •  rosuvastatin (CRESTOR) 20 MG tablet  •  sucralfate (CARAFATE) 1 g tablet  •  sucralfate (CARAFATE) 1 g tablet  •  ondansetron (ZOFRAN) 4 mg tablet    Allergies   Allergen Reactions   • Amlodipine Edema     hands   • Amoxicillin Abdominal Pain, GI Intolerance and Nausea Only   • Clotrimazole Vomiting   • Cyclobenzaprine Nausea Only   • Naproxen Nausea Only   • Nystatin Nausea Only   • Other      Very sensitive to strong perfumes, sprays, smoke and exercise etc.  Recently dx with asthma   • Tramadol Nausea Only       PHYSICAL EXAM:    Blood pressure 100/60, height 5' 1" (1.549 m), weight 52.2 kg (115 lb). Body mass index is 21.73 kg/m². Constitutional: Well-developed, no acute distress  HEENT: normocephalic, mucous membranes moist.  Neck: Supple  Skin: warm and dry  Respiratory: Lungs are clear to auscultation B/L. Cardiovascular: Heart is regular rate and rhythm. Gastrointestinal: Soft, nontender, nondistended with normal active bowel sounds. No masses, guarding, rebound. Rectal Exam: Deferred. Integumentary: Warm and dry  Extremities: No edema. Neurologic: Nonfocal. A & O ×3. Psychiatric: Normal affect. Lab Results:   As above    Radiology Results:   No results found. Patient expressed understanding and had all questions and concerns addressed. Advised patient to call with any questions, failure to improve, or if symptoms change or worsen.     Carlos Enrique Patricio PA-C  08/10/23  4:02 PM    This chart was completed in part utilizing [employer] [facility] speech voice recognition software. Random word insertions, pronoun errors, and incomplete sentences are an occasional consequence of this system due to software limitations, and ambient noise. Any questions or concerns about the content, text, or information contained within the body of this dictation should be directly addressed to the provider for clarification.

## 2023-08-29 ENCOUNTER — OFFICE VISIT (OUTPATIENT)
Dept: NEUROLOGY | Facility: CLINIC | Age: 73
End: 2023-08-29
Payer: MEDICARE

## 2023-08-29 ENCOUNTER — TELEPHONE (OUTPATIENT)
Dept: NEUROLOGY | Facility: CLINIC | Age: 73
End: 2023-08-29

## 2023-08-29 VITALS
DIASTOLIC BLOOD PRESSURE: 60 MMHG | SYSTOLIC BLOOD PRESSURE: 150 MMHG | BODY MASS INDEX: 21.8 KG/M2 | WEIGHT: 115.4 LBS | HEART RATE: 66 BPM

## 2023-08-29 DIAGNOSIS — G60.3 IDIOPATHIC PROGRESSIVE NEUROPATHY: ICD-10-CM

## 2023-08-29 DIAGNOSIS — R55 SYNCOPE, UNSPECIFIED SYNCOPE TYPE: Primary | ICD-10-CM

## 2023-08-29 DIAGNOSIS — G43.109 OCULAR MIGRAINE: ICD-10-CM

## 2023-08-29 DIAGNOSIS — G62.9 NEUROPATHY: ICD-10-CM

## 2023-08-29 DIAGNOSIS — G60.9 HEREDITARY AND IDIOPATHIC NEUROPATHY, UNSPECIFIED: ICD-10-CM

## 2023-08-29 PROCEDURE — 99215 OFFICE O/P EST HI 40 MIN: CPT | Performed by: NURSE PRACTITIONER

## 2023-08-29 NOTE — PROGRESS NOTES
Patient ID: Hermes Campbell is a 74 y.o. female. Assessment/Plan:    Neuropathy  Symptoms of numbness and tingling in the feet since 2018 with no significant progression since onset. She did have EMG in the past that was reported normal. Work up for her neuropathy has been unrevealing that was performed at [facility] [place]. Sensory and strength exam was normal today other then slightly reduced reflexes in the ankles. We did discuss consider repeat EMG, skin biopsy to assess for small fiber neuropathy, however given no significant progression of symptoms over several years deferred for now. Will order additional labs to complete work up. We did discuss the incidence rate of idiopathic neuropathies in her age group. There was a question if her syncopal episodes were related to autonomic neuropathy however patient does not have orthostatic BP in the hospital or in the office today so is unlikely. If she has further events we can further investigate. Ocular migraine  Hx of ocular migraines for 15 years. No new symptoms. Occurs less then once a month therefore preventative medication is not recommended. She does follow with eye dr regularly. Syncope  Patient seen for hospital follow up for syncopal episodes-reports several episodes of nausea, lightheadedness, ear ringing and then passing out for several seconds. Did have episode of incontinence with event (does have possible bladder prolapse and stress incontinence), no other seizure like activity. Since hospitalization she denies any event. She does have a history of similar episodes when she was younger, however have not occurred in the past 30 years. Low suspicion for seizure, EEG was normal. CTA head/neck, echo with no significant findings. She was not orthostatic in the office today nor in the hospital. Suspicion for vasovagal events, does report she may have been suffering from GI illness due to "bad salmon" with significant nausea during her events. She does have follow up with cardiology next month to discuss holter monitor for continued cardiac work up. No further testing from neurologic standpoint. If she has any further events she should notify the office. Plan for follow up in 6 months. To contact the office sooner with any concerns or worsening symptoms. I have spent a total time of 65 minutes on 08/29/23 in caring for this patient including Diagnostic results, Instructions for management, Patient and family education, Impressions, Counseling / Coordination of care, Reviewing / ordering tests, medicine, procedures   and Obtaining or reviewing history  . Diagnoses and all orders for this visit:    Syncope, unspecified syncope type    Neuropathy  -     Sjogren's Antibodies; Future  -     Vitamin B12; Future  -     Vitamin B6; Future  -     Methylmalonic acid, serum; Future  -     Sjogren's Antibodies  -     Vitamin B12  -     Vitamin B6  -     Methylmalonic acid, serum    Idiopathic progressive neuropathy  -     Vitamin B12; Future  -     Vitamin B12    Hereditary and idiopathic neuropathy, unspecified  -     Vitamin B6; Future  -     Vitamin B6    Ocular migraine           Subjective:    HPI    Patient is a 33-year-old female with history of hypothyroidism, asthma, hyperlipidemia, GERD, chronic tinnitus, history of stress incontinence, melanoma and cataracts.  She is seen today for hospital follow up. Per chart review she presented to the hospital 7/8/23 for syncopal episodes.  For a few days prior she would have syncopal episodes with lightheadedness, nausea, and tinnitus, she would be able to lower herself to the ground during episodes. Event was not suggestive of a seizure. she was not orthostatic during her hospitalization. It was recommended for her to have holter monitor outpatient for syncopal work up. It was thoughout her syncopal was likely vasovagal. She has a hx of vasovagal syncope when she was younger.  Was recommended to follow up outpatient for neuropathy work up. Patient also follows with neurology as an outpatient at [facility] [place] neurology, the patient was last seen on 1/31/2023, it was reported she was initially seen for numbness/tingling in hands and right greater than left foot numbness and tingling that started in December 2018, reported she also had a negative EMG in the past   she also has a history of ocular migraines which is described as flashing arrow with a few minute duration.  During her last counter with neurology she was also complaining of right shoulder pain rating from her neck and jaw, which PT was recommended. interval History:  She does state in the past nausea has been a trigger to lightheadedness in the past and near syncopal episodes in the past. She states the few days prior to to her hospitalization she ate bad salmon and has some nausea she was treating with zofran, would note increased nausea prior to episode of syncope. She did see GI about nausea, felt acute nausea may have been related to possible virus or reduction in sulcrafate. She does state she has warning prior to event in which she could pull over. She does have appt with cardiology in [address] [place] [address]. Her last episode of syncope was in her 45s after she had a procedure.     numbness and tingling started in 2018, pins and needles sensation in the feet. This has not progressed over this time frame. She denies any muscle weakness. She denies any imbalance. She is non smoker, does not consume alcohol. She does have a hx of ocular migraines, mainly triggered by stress. She has had these for 15 years. She will see blurry vision and then develop flashing. More recently these only occur once a month at most, resolve within 30 mins without treatment. No new symptoms with these.                prior work up:  • Echocardiogram EF 98%, grade 1 diastolic dysfunction   • CTA head/neck: Diastases of the anterior and posterior arches of C1, no evidence of large vessel vascular occlusion or hemodynamically significant stenosis, aneurysm, mild microangiopathic disease without acute intracranial abnormality  ? She denies any neck pain possibly developmental   EEG normal     EMG performed at [facility] 1/2019 normal  Lyme Ab neg, SPEP/REGINA nl, B12/MMA nl, A1c 5.4, CRP nl.  in the past per notes from [facility]     recent labs 7/2023 tsh 6.813, t4 normal, a1c 5.2     The patient presented to the hospital at [address] with syncopal episodes.   CTA of the head and neck showed no significant stenosis or evidence of VBI, echo and EEG are pending. The following portions of the patient's history were reviewed and updated as appropriate: allergies, current medications, past family history, past medical history, past social history, past surgical history and problem list.          Objective:    Blood pressure 150/60, pulse 66, weight 52.3 kg (115 lb 6.4 oz). Physical Exam  Constitutional:       General: She is awake. HENT:      Right Ear: Hearing normal.      Left Ear: Hearing normal.   Eyes:      General: Lids are normal.      Extraocular Movements: Extraocular movements intact. Pupils: Pupils are equal, round, and reactive to light. Neurological:      Mental Status: She is alert. Deep Tendon Reflexes:      Reflex Scores:       Bicep reflexes are 2+ on the right side and 2+ on the left side. Brachioradialis reflexes are 2+ on the right side and 2+ on the left side. Patellar reflexes are 2+ on the right side and 2+ on the left side. Psychiatric:         Speech: Speech normal.         Neurological Exam  Mental Status  Awake and alert. Oriented to person, place, time and situation. Speech is normal. Language is fluent with no aphasia. Cranial Nerves  CN II: Visual fields full to confrontation. CN III, IV, VI: Extraocular movements intact bilaterally. Normal lids and orbits bilaterally.  Pupils equal round and reactive to light bilaterally. CN V:  Right: Facial sensation is normal.  Left: Facial sensation is normal on the left. CN VII:  Right: There is no facial weakness. Left: There is no facial weakness. CN VIII:  Right: Hearing is normal.  Left: Hearing is normal.  CN IX, X: Palate elevates symmetrically  CN XI:  Right: Trapezius strength is normal.  Left: Trapezius strength is normal.  CN XII: Tongue midline without atrophy or fasciculations. Motor  Normal muscle bulk throughout. No fasciculations present. Normal muscle tone. Right                     Left  Elbow flexion                         5                          5  Elbow extension                    5                          5  Wrist flexion                          5                           Wrist extension                      5                          5  Finger flexion                         5                          5  Finger abduction                    5                          5  Hip flexion                              5                          5  Knee flexion                           5                          5  Knee extension                      5                          5  Ankle inversion                      5                          5  Ankle eversion                       5                          5  Plantarflexion                         5                          5  Dorsiflexion                            5                          5    Sensory  Light touch is normal in upper and lower extremities. Pinprick is normal in upper and lower extremities. Temperature is normal in upper and lower extremities. Vibration is normal in upper and lower extremities. Proprioception is normal in upper and lower extremities.      Reflexes                                            Right                      Left  Brachioradialis                    2+                         2+  Biceps 2+                         2+  Patellar                                2+                         2+  Achilles                                Tr                         Tr  Right Plantar: downgoing  Left Plantar: downgoing    Coordination  Right: Finger-to-nose normal. Rapid alternating movement normal.Left: Finger-to-nose normal. Rapid alternating movement normal.    Gait  Casual gait is normal including stance, stride, and arm swing. Able to rise from chair without using arms. I have personally reviewed the ROS performed by the MA.    ROS:    Review of Systems   Constitutional: Negative for appetite change, fatigue and fever. HENT: Negative. Negative for hearing loss, tinnitus, trouble swallowing and voice change. Eyes: Negative. Negative for photophobia, pain and visual disturbance. Respiratory: Negative. Negative for shortness of breath. Cardiovascular: Negative. Negative for palpitations. Gastrointestinal: Negative. Negative for nausea and vomiting. Endocrine: Negative. Negative for cold intolerance. Genitourinary: Negative. Negative for dysuria, frequency and urgency. Musculoskeletal: Negative for back pain, gait problem, myalgias and neck pain. Skin: Negative. Negative for rash. Allergic/Immunologic: Negative. Neurological: Positive for syncope. Negative for dizziness, tremors, seizures, facial asymmetry, speech difficulty, weakness, light-headedness, numbness and headaches. Hematological: Negative. Does not bruise/bleed easily. Psychiatric/Behavioral: Negative. Negative for confusion, hallucinations and sleep disturbance.

## 2023-08-29 NOTE — PATIENT INSTRUCTIONS
Follow up with cardiology to discuss holter monitor. If episodes reoccur then would suggest further work up.      If you have any nausea would suggest avoiding any dangerous activity such as driving in case you have another episode      Obtain blood work for other causes of neuropathy

## 2023-08-29 NOTE — TELEPHONE ENCOUNTER
Patient Medicare was just activated as the primary today.  Patient was told to call the office to inform them to resubmit the bill to medicare in 10 to 14 days

## 2023-08-29 NOTE — ASSESSMENT & PLAN NOTE
Patient seen for hospital follow up for syncopal episodes-reports several episodes of nausea, lightheadedness, ear ringing and then passing out for several seconds. Did have episode of incontinence with event (does have possible bladder prolapse and stress incontinence), no other seizure like activity. Since hospitalization she denies any event. She does have a history of similar episodes when she was younger, however have not occurred in the past 30 years. Low suspicion for seizure, EEG was normal. CTA head/neck, echo with no significant findings. She was not orthostatic in the office today nor in the hospital. Suspicion for vasovagal events, does report she may have been suffering from GI illness due to "bad salmon" with significant nausea during her events. She does have follow up with cardiology next month to discuss holter monitor for continued cardiac work up. No further testing from neurologic standpoint. If she has any further events she should notify the office.

## 2023-08-29 NOTE — TELEPHONE ENCOUNTER
[employer] from [employer] is requesting the office notes from today's visit with Kassie Farrell.  Please fax it to [phone]

## 2023-08-29 NOTE — ASSESSMENT & PLAN NOTE
Hx of ocular migraines for 15 years. No new symptoms. Occurs less then once a month therefore preventative medication is not recommended. She does follow with eye dr regularly.

## 2023-08-29 NOTE — ASSESSMENT & PLAN NOTE
Symptoms of numbness and tingling in the feet since 2018 with no significant progression since onset. She did have EMG in the past that was reported normal. Work up for her neuropathy has been unrevealing that was performed at [facility] [place] neurology. Sensory and strength exam was normal today other then slightly reduced reflexes in the ankles. We did discuss consider repeat EMG, skin biopsy to assess for small fiber neuropathy, however given no significant progression of symptoms over several years deferred for now. Will order additional labs to complete work up. We did discuss the incidence rate of idiopathic neuropathies in her age group. There was a question if her syncopal episodes were related to autonomic neuropathy however patient does not have orthostatic BP in the hospital or in the office today so is unlikely. If she has further events we can further investigate.

## 2023-08-30 ENCOUNTER — OFFICE VISIT (OUTPATIENT)
Dept: PULMONOLOGY | Facility: CLINIC | Age: 73
End: 2023-08-30
Payer: MEDICARE

## 2023-08-30 VITALS
TEMPERATURE: 97.6 F | SYSTOLIC BLOOD PRESSURE: 118 MMHG | OXYGEN SATURATION: 98 % | HEART RATE: 68 BPM | DIASTOLIC BLOOD PRESSURE: 64 MMHG | WEIGHT: 115 LBS | BODY MASS INDEX: 21.73 KG/M2

## 2023-08-30 DIAGNOSIS — I20.1 VASOSPASTIC ANGINA (HCC): ICD-10-CM

## 2023-08-30 DIAGNOSIS — J45.30 MILD PERSISTENT ASTHMA WITHOUT COMPLICATION: Primary | ICD-10-CM

## 2023-08-30 PROBLEM — J45.31 MILD PERSISTENT ASTHMA WITH ACUTE BRONCHITIS AND ACUTE EXACERBATION: Status: RESOLVED | Noted: 2021-04-30 | Resolved: 2023-08-30

## 2023-08-30 PROBLEM — J45.40 MODERATE PERSISTENT ASTHMA WITHOUT COMPLICATION: Status: ACTIVE | Noted: 2023-08-30

## 2023-08-30 PROBLEM — J20.9 MILD PERSISTENT ASTHMA WITH ACUTE BRONCHITIS AND ACUTE EXACERBATION: Status: RESOLVED | Noted: 2021-04-30 | Resolved: 2023-08-30

## 2023-08-30 PROCEDURE — 99215 OFFICE O/P EST HI 40 MIN: CPT | Performed by: INTERNAL MEDICINE

## 2023-08-30 RX ORDER — BACLOFEN 5 MG/1
1 TABLET ORAL
COMMUNITY
Start: 2023-06-01

## 2023-08-30 NOTE — ASSESSMENT & PLAN NOTE
Elver Robert is having more trouble breathing over the past few months likely due to poor air quality and heat and humidity. It seems to be getting better this month we talked about escalating her therapy but she is very sensitive to medications. We will continue her Breo 200 and use the albuterol as needed understanding that if things do not get worse in several weeks we will likely escalate her to Trelegy. She will let me know if she needs this to happen.

## 2023-08-30 NOTE — PROGRESS NOTES
Assessment/Plan: Moderate persistent asthma without complication  Lupe is having more trouble breathing over the past few months likely due to poor air quality and heat and humidity. It seems to be getting better this month we talked about escalating her therapy but she is very sensitive to medications. We will continue her Breo 200 and use the albuterol as needed understanding that if things do not get worse in several weeks we will likely escalate her to Trelegy. She will let me know if she needs this to happen. Vasospastic angina (HCC)  Stable on metoprolol twice a day. Diagnoses and all orders for this visit:    Mild persistent asthma without complication    Vasospastic angina (HCC)    Other orders  -     Baclofen 5 MG TABS; Take 1 tablet by mouth daily at bedtime          Subjective:      Patient ID: Jose E Bernal is a 74 y.o. female. Lolis Charles has been having a hard time breathing through the summer. In June July and August she has been using her rescue inhaler much more frequently. In June she needed a prednisone taper. She is using her Breo 200 and attributes the difficulty breathing to hot humid air and poor air quality. primary symptoms  Pertinent negatives include no chest pain, coughing, fever, headaches, myalgias or sore throat. The following portions of the patient's history were reviewed and updated as appropriate: allergies, current medications, past family history, past medical history, past social history, past surgical history and problem list.    Review of Systems   Constitutional: Negative. Negative for appetite change, fever and unexpected weight change. HENT: Positive for postnasal drip. Negative for ear pain, rhinorrhea, sneezing, sore throat and trouble swallowing. Eyes: Negative. Respiratory: Positive for shortness of breath. Negative for cough and wheezing. Cardiovascular: Negative. Negative for chest pain and leg swelling.    Gastrointestinal: Negative. Endocrine: Negative. Genitourinary: Negative. Musculoskeletal: Negative. Negative for myalgias. Allergic/Immunologic: Negative. Neurological: Negative. Negative for headaches. Hematological: Negative. Objective:      /64 (BP Location: Right arm, Patient Position: Sitting, Cuff Size: Standard)   Pulse 68   Temp 97.6 °F (36.4 °C) (Tympanic)   Wt 52.2 kg (115 lb)   SpO2 98%   BMI 21.73 kg/m²          Physical Exam  Constitutional:       Appearance: She is well-developed. HENT:      Head: Normocephalic. Eyes:      Pupils: Pupils are equal, round, and reactive to light. Cardiovascular:      Rate and Rhythm: Normal rate. Heart sounds: No murmur heard. Pulmonary:      Effort: Pulmonary effort is normal. No respiratory distress. Breath sounds: Normal breath sounds. No wheezing or rales. Abdominal:      Palpations: Abdomen is soft. Musculoskeletal:         General: Normal range of motion. Cervical back: Normal range of motion and neck supple. Skin:     General: Skin is warm and dry. Neurological:      Mental Status: She is alert and oriented to person, place, and time.          Answers for HPI/ROS submitted by the patient on 8/28/2023  Do you have chest tightness?: Yes  Do you experience frequent throat clearing?: Yes  Chronicity: recurrent  When did you first notice your symptoms?: more than 1 month ago  How often do your symptoms occur?: daily  Since you first noticed this problem, how has it changed?: waxing and waning  Do you have shortness of breath that occurs with effort or exertion?: Yes  Do you have ear congestion?: Yes  Do you have heartburn?: No  Do you have fatigue?: Yes  Do you have nasal congestion?: No  Do you have shortness of breath when lying flat?: No  Do you have shortness of breath when you wake up?: No  Do you have sweats?: No  Have you experienced weight loss?: No  Which of the following makes your symptoms worse?: any activity, change in weather, exercise, exposure to fumes, exposure to smoke, strenuous activity

## 2023-09-09 LAB
ENA SS-A AB SER IA-ACNC: NORMAL AI
ENA SS-B AB SER IA-ACNC: NORMAL AI
METHYLMALONATE SERPL-SCNC: 125 NMOL/L (ref 87–318)
VIT B12 SERPL-MCNC: 538 PG/ML (ref 200–1100)
VIT B6 SERPL-MCNC: 7.3 NG/ML (ref 2.1–21.7)

## 2023-09-11 ENCOUNTER — TELEPHONE (OUTPATIENT)
Dept: NEUROLOGY | Facility: CLINIC | Age: 73
End: 2023-09-11

## 2023-09-11 NOTE — TELEPHONE ENCOUNTER
Dr Criss Padilla a dentist from [place], [place] dental [facility] called to speak with Caleb Ortega about patient syncope.  Please call Dr Miladis Bob at [phone]

## 2023-09-12 ENCOUNTER — TELEPHONE (OUTPATIENT)
Dept: NEUROLOGY | Facility: CLINIC | Age: 73
End: 2023-09-12

## 2023-09-12 NOTE — TELEPHONE ENCOUNTER
----- Message from Vianca Treviño RN sent at 9/11/2023  4:07 PM EDT -----  Please contact patient with normal results.  Thank you     ----- Message -----  From: ERICH Taylor  Sent: 9/11/2023   1:56 PM EDT  To: Neurology [id number] [place] Team 2    Please let patient know all labs were normal

## 2023-09-26 ENCOUNTER — OFFICE VISIT (OUTPATIENT)
Dept: CARDIOLOGY CLINIC | Facility: CLINIC | Age: 73
End: 2023-09-26
Payer: MEDICARE

## 2023-09-26 VITALS
SYSTOLIC BLOOD PRESSURE: 152 MMHG | WEIGHT: 116 LBS | OXYGEN SATURATION: 98 % | HEART RATE: 80 BPM | DIASTOLIC BLOOD PRESSURE: 74 MMHG | BODY MASS INDEX: 21.9 KG/M2 | HEIGHT: 61 IN

## 2023-09-26 DIAGNOSIS — I20.89 CARDIAC SYNDROME X: Primary | ICD-10-CM

## 2023-09-26 DIAGNOSIS — R55 VASOVAGAL SYNCOPE: ICD-10-CM

## 2023-09-26 DIAGNOSIS — R03.0 WHITE COAT SYNDROME WITHOUT HYPERTENSION: ICD-10-CM

## 2023-09-26 PROBLEM — Z03.89 CORONARY ARTERY DISEASE EXCLUDED: Status: RESOLVED | Noted: 2023-03-13 | Resolved: 2023-09-26

## 2023-09-26 PROCEDURE — 99214 OFFICE O/P EST MOD 30 MIN: CPT | Performed by: INTERNAL MEDICINE

## 2023-09-26 RX ORDER — ROSUVASTATIN CALCIUM 20 MG/1
20 TABLET, COATED ORAL
Qty: 90 TABLET | Refills: 3 | Status: SHIPPED | OUTPATIENT
Start: 2023-09-26

## 2023-09-26 RX ORDER — NITROGLYCERIN 0.4 MG/1
0.4 TABLET SUBLINGUAL
Qty: 30 TABLET | Refills: 0 | Status: SHIPPED | OUTPATIENT
Start: 2023-09-26

## 2023-09-26 NOTE — PROGRESS NOTES
Cardiology Outpatient Follow-Up Note - Rebecca Azul 74 y.o. female MRN: [id number]      Assessment/Plan:    1. Cardiac syndrome X  Vasospastic vs microvascular angina. Continue metoprolol 25 mg BID. Did not tolerate amlodipine. Continue rosuvastatin 20 mg daily. - rosuvastatin (CRESTOR) 20 MG tablet; Take 1 tablet (20 mg total) by mouth daily with dinner  Dispense: 90 tablet; Refill: 3  - metoprolol tartrate (LOPRESSOR) 25 mg tablet; Take 1 tablet (25 mg total) by mouth every 12 (twelve) hours  Dispense: 180 tablet; Refill: 3  - nitroglycerin (NITROSTAT) 0.4 mg SL tablet; Place 1 tablet (0.4 mg total) under the tongue every 5 (five) minutes as needed for chest pain  Dispense: 30 tablet; Refill: 0    2. Vasovagal syncope  Without recurrence recently since July hospitalization. Occurred in setting of nausea/GI upset, which has been a precipitant of syncope for her in the past. Low concern at this time for arrhythmia as etiology. We will defer ambulatory rhythm monitoring for now. Patient to call if recurrent presyncope or syncope and we can perform Zio monitor 2-4 weeks. Yield would be low given how infrequent symptoms occur. 3. White coat syndrome without hypertension  BP log reviewed. Her home BP run consistently <110/70. Her office BP's tend to run higher, consistent with white coat hypertension. No additional pharmacotherapy indicated at this time. We will see Olena Bay back in 12 months for routine follow-up. Subjective:     HPI: Olena Bay is a 74y.o. year old female with cardiac syndrome X, vasovagal syncope, moderate mitral regurgitation, hypothyroidism, asthma presenting for follow-up. She had an abnormal stress echo on 2/22/2023 which showed both ST segment deviation/depression and stress-induced inferior wall motion abnormality at 7.0 METS of exertion.   Subsequent cardiac catheterization on 3/13/2023 did not show obstructive epicardial CAD; although she did have ST deviation during the study concerning for vasospasm. She has responded positively to antianginal therapy with metoprolol, and we have been treating her with presumed vasospastic/microvascular angina. She was admitted to [address] [place] [address] in July 2023 with syncope. DC summary 7/7/23 reviewed. She was seen by neurology and cardiology during this hospitalization. No significant abnormalities were found. CTA head and neck 7/8/23 showed no vascular issues. Echo 7/10/23 was unchanged from prior. Telemetry was unremarkable. Etiology thought to be due to vasovagal event. She has since followed with neurology with no specific plans for further work-up in regards to syncope. She has not been having chest pain although she has been getting chest tightness corresponding to asthma flares. She feels like her breathing gets tight and she will put an ice pack on her chest with relief. Cardiac Testing:    Echo 7/10/23 Interpretation Summary         Left Ventricle: Left ventricular cavity size is normal. Wall thickness is normal. The left ventricular ejection fraction is 65%. Systolic function is normal. Wall motion is normal. Diastolic function is mildly abnormal, consistent with grade I (abnormal) relaxation. Left Atrium: The atrium is mildly dilated. Aortic Valve: There is mild regurgitation. Mitral Valve: There is mild annular calcification. There is moderate regurgitation. Tricuspid Valve: There is mild to moderate regurgitation. Pulmonic Valve: There is mild regurgitation. Pulmonary Artery: The pulmonary artery systolic pressure is upper normal.       Echo stress from 2/22/2023; patient exercised for 6 minutes 1 second, 169 bpm, 115% MPHR, 7.0 METS limited by chest pain. There are horizontal, 1 mm ST depressions in the inferior leads II, III, aVF, precordial ST segment changes do not meet diagnostic criteria for ischemia. EKG is overall consistent with ischemia.   The baseline rest echocardiogram shows normal ejection fraction 55 to 60% with no regional wall motion abnormalities; at peak stress there is akinesis of the basal to mid inferior wall as well as a failure of the LV to properly augment. Coronary angiogram 3/13/23  Impression         No angiographic evidence of obstructive CAD    Transient EKG ST/TW changes suggestive of possible vasospasm    LVEDP normal without gradient on LV-AO pullback           EKGs, personally reviewed:  n/a    Relevant Labs & Results:  Lipid panel 3/13/23 -- , LDL 36 mg/dL  LDL direct 3/13/23 -- 42 mg/dL   CMP 7/10/23 -- K 3.5, Cr 0.55    ROS:  Review of Systems:  Review of Systems    Objective:     Vitals:   Vitals:    09/26/23 1443   BP: 152/74   BP Location: Left arm   Patient Position: Sitting   Cuff Size: Standard   Pulse: 80   SpO2: 98%   Weight: 52.6 kg (116 lb)   Height: 5' 1" (1.549 m)    Body surface area is 1.5 meters squared. Wt Readings from Last 3 Encounters:   09/26/23 52.6 kg (116 lb)   08/30/23 52.2 kg (115 lb)   08/29/23 52.3 kg (115 lb 6.4 oz)       Physical Exam:    General: Chanda Conley is a well appearing female, in no acute distress, sitting comfortably  HEENT: moist mucous membranes, EOMI  Neck:  No JVD, supple, trachea midline  Cardiovascular: unremarkable S1/S2, regular rate and rhythm, no murmurs, rubs or gallops  Pulmonary: normal respiratory effort, CTAB  Abdomen: soft and nondistended  Extremities: No lower extremity edema. Warm and well perfused extremities. Neuro: no focal motor deficits, AAOx3 (person, place, time)  Psych: Normal mood and affect, cooperative      Medications (at the START of this encounter):   Outpatient Medications Prior to Visit   Medication Sig Dispense Refill    albuterol (PROVENTIL HFA,VENTOLIN HFA) 90 mcg/act inhaler Inhale 2 puffs every 4 (four) hours as needed for shortness of breath 18 g 5    Baclofen 5 MG TABS Take 1 tablet by mouth daily at bedtime      Cholecalciferol 50 MCG (2000 UT) CAPS Take 1 tablet by mouth daily      fluticasone (VERAMYST) 27.5 MCG/SPRAY nasal spray 1 spray into each nostril daily      fluticasone-vilanterol (Breo Ellipta) 200-25 mcg/actuation inhaler Inhale 1 puff daily Rinse mouth after use. 60 each 5    levothyroxine 50 mcg tablet Take 50 mcg by mouth daily in the early morning      metoprolol tartrate (LOPRESSOR) 25 mg tablet Take 1 tablet (25 mg total) by mouth every 12 (twelve) hours 180 tablet 3    metroNIDAZOLE (METROGEL) 1 % gel Apply 1 application topically daily      Multiple Vitamins-Minerals (PRESERVISION AREDS 2 PO) Take 1 capsule by mouth 2 (two) times a day with meals      nitroglycerin (NITROSTAT) 0.4 mg SL tablet Place 1 tablet (0.4 mg total) under the tongue every 5 (five) minutes as needed for chest pain 30 tablet 0    ondansetron (ZOFRAN) 4 mg tablet Take 4 mg by mouth every 8 (eight) hours as needed for nausea      Peak Flow Meter NANDA 1 Act by Does not apply route 2 (two) times a day      polyethylene glycol-propylene glycol (SYSTANE) 0.4-0.3 % Apply 1 drop to eye Three times a day      raloxifene (EVISTA) 60 mg tablet Take 60 mg by mouth daily      rosuvastatin (CRESTOR) 20 MG tablet Take 1 tablet (20 mg total) by mouth daily (Patient taking differently: Take 20 mg by mouth daily with dinner) 90 tablet 1    sucralfate (CARAFATE) 1 g tablet Take 1 g by mouth daily with lunch One tablet with lunch and dinner      sucralfate (CARAFATE) 1 g tablet Take Half a tablet after dinner (Patient not taking: Reported on 9/26/2023)  0     No facility-administered medications prior to visit. Time Spent:  Total time (face-to-face and non-face-to-face) spent on today's visit was 20 minutes. This includes preparation for the visits (i.e. reviewing test results), performance of a medically appropriate history and examination, and orders for medications, tests or other procedures. This time is exclusive of procedures performed and time spent teaching.       This note was completed in part utilizing [employer] voice recognition software. Grammatical errors, random word insertion, spelling mistakes, occasional wrong word or "sound-alike" substitutions and incomplete sentences may be an occasional consequence of the system secondary to software limitations, ambient noise and hardware issues. At the time of dictation, efforts were made to edit, clarify and /or correct errors. Please read the chart carefully and recognize, using context, where substitutions have occurred. If you have any questions or concerns about the context, text or information contained within the body of this dictation, please contact myself, the provider, for further clarification.

## 2023-10-02 ENCOUNTER — APPOINTMENT (EMERGENCY)
Dept: RADIOLOGY | Facility: HOSPITAL | Age: 73
End: 2023-10-02
Payer: MEDICARE

## 2023-10-02 ENCOUNTER — HOSPITAL ENCOUNTER (EMERGENCY)
Facility: HOSPITAL | Age: 73
Discharge: HOME | End: 2023-10-03
Attending: EMERGENCY MEDICINE
Payer: MEDICARE

## 2023-10-02 ENCOUNTER — APPOINTMENT (EMERGENCY)
Dept: CT IMAGING | Facility: HOSPITAL | Age: 73
End: 2023-10-02
Payer: MEDICARE

## 2023-10-02 ENCOUNTER — TELEPHONE (OUTPATIENT)
Dept: CARDIOLOGY CLINIC | Facility: CLINIC | Age: 73
End: 2023-10-02

## 2023-10-02 DIAGNOSIS — R55 SYNCOPE: ICD-10-CM

## 2023-10-02 DIAGNOSIS — E87.6 HYPOKALEMIA: ICD-10-CM

## 2023-10-02 DIAGNOSIS — R93.5 ABNORMAL CT OF THE ABDOMEN: Primary | ICD-10-CM

## 2023-10-02 DIAGNOSIS — S09.90XA MINOR HEAD INJURY, INITIAL ENCOUNTER: ICD-10-CM

## 2023-10-02 DIAGNOSIS — R11.2 NAUSEA AND VOMITING: ICD-10-CM

## 2023-10-02 LAB
ABO GROUP BLD: NORMAL
ALBUMIN SERPL BCP-MCNC: 4.2 G/DL (ref 3.5–5)
ALP SERPL-CCNC: 60 U/L (ref 34–104)
ALT SERPL W P-5'-P-CCNC: 16 U/L (ref 7–52)
ANION GAP SERPL CALCULATED.3IONS-SCNC: 7 MMOL/L
APTT PPP: 24 SECONDS (ref 23–37)
AST SERPL W P-5'-P-CCNC: 20 U/L (ref 13–39)
BASOPHILS # BLD AUTO: 0.04 THOUSANDS/ÂΜL (ref 0–0.1)
BASOPHILS NFR BLD AUTO: 0 % (ref 0–1)
BILIRUB SERPL-MCNC: 0.73 MG/DL (ref 0.2–1)
BLD GP AB SCN SERPL QL: NEGATIVE
BUN SERPL-MCNC: 15 MG/DL (ref 5–25)
CALCIUM SERPL-MCNC: 9.5 MG/DL (ref 8.4–10.2)
CARDIAC TROPONIN I PNL SERPL HS: 6 NG/L
CHLORIDE SERPL-SCNC: 101 MMOL/L (ref 96–108)
CO2 SERPL-SCNC: 27 MMOL/L (ref 21–32)
CREAT SERPL-MCNC: 0.68 MG/DL (ref 0.6–1.3)
EOSINOPHIL # BLD AUTO: 0.05 THOUSAND/ÂΜL (ref 0–0.61)
EOSINOPHIL NFR BLD AUTO: 0 % (ref 0–6)
ERYTHROCYTE [DISTWIDTH] IN BLOOD BY AUTOMATED COUNT: 13.1 % (ref 11.6–15.1)
GFR SERPL CREATININE-BSD FRML MDRD: 85 ML/MIN/1.73SQ M
GLUCOSE SERPL-MCNC: 112 MG/DL (ref 65–140)
HCT VFR BLD AUTO: 38.8 % (ref 34.8–46.1)
HGB BLD-MCNC: 13.2 G/DL (ref 11.5–15.4)
IMM GRANULOCYTES # BLD AUTO: 0.07 THOUSAND/UL (ref 0–0.2)
IMM GRANULOCYTES NFR BLD AUTO: 1 % (ref 0–2)
INR PPP: 0.93 (ref 0.84–1.19)
LIPASE SERPL-CCNC: 22 U/L (ref 11–82)
LYMPHOCYTES # BLD AUTO: 2.65 THOUSANDS/ÂΜL (ref 0.6–4.47)
LYMPHOCYTES NFR BLD AUTO: 21 % (ref 14–44)
MCH RBC QN AUTO: 30.2 PG (ref 26.8–34.3)
MCHC RBC AUTO-ENTMCNC: 34 G/DL (ref 31.4–37.4)
MCV RBC AUTO: 89 FL (ref 82–98)
MONOCYTES # BLD AUTO: 1.34 THOUSAND/ÂΜL (ref 0.17–1.22)
MONOCYTES NFR BLD AUTO: 11 % (ref 4–12)
NEUTROPHILS # BLD AUTO: 8.58 THOUSANDS/ÂΜL (ref 1.85–7.62)
NEUTS SEG NFR BLD AUTO: 67 % (ref 43–75)
NRBC BLD AUTO-RTO: 0 /100 WBCS
PLATELET # BLD AUTO: 188 THOUSANDS/UL (ref 149–390)
PMV BLD AUTO: 9.3 FL (ref 8.9–12.7)
POTASSIUM SERPL-SCNC: 3.2 MMOL/L (ref 3.5–5.3)
PROT SERPL-MCNC: 6.7 G/DL (ref 6.4–8.4)
PROTHROMBIN TIME: 13.2 SECONDS (ref 11.6–14.5)
RBC # BLD AUTO: 4.37 MILLION/UL (ref 3.81–5.12)
RH BLD: POSITIVE
SODIUM SERPL-SCNC: 135 MMOL/L (ref 135–147)
SPECIMEN EXPIRATION DATE: NORMAL
WBC # BLD AUTO: 12.73 THOUSAND/UL (ref 4.31–10.16)

## 2023-10-02 PROCEDURE — 86900 BLOOD TYPING SEROLOGIC ABO: CPT | Performed by: EMERGENCY MEDICINE

## 2023-10-02 PROCEDURE — 86901 BLOOD TYPING SEROLOGIC RH(D): CPT | Performed by: EMERGENCY MEDICINE

## 2023-10-02 PROCEDURE — 72125 CT NECK SPINE W/O DYE: CPT

## 2023-10-02 PROCEDURE — 83690 ASSAY OF LIPASE: CPT | Performed by: EMERGENCY MEDICINE

## 2023-10-02 PROCEDURE — 70450 CT HEAD/BRAIN W/O DYE: CPT

## 2023-10-02 PROCEDURE — 74177 CT ABD & PELVIS W/CONTRAST: CPT

## 2023-10-02 PROCEDURE — 93005 ELECTROCARDIOGRAM TRACING: CPT

## 2023-10-02 PROCEDURE — 71045 X-RAY EXAM CHEST 1 VIEW: CPT

## 2023-10-02 PROCEDURE — 85730 THROMBOPLASTIN TIME PARTIAL: CPT | Performed by: EMERGENCY MEDICINE

## 2023-10-02 PROCEDURE — G1004 CDSM NDSC: HCPCS

## 2023-10-02 PROCEDURE — 86850 RBC ANTIBODY SCREEN: CPT | Performed by: EMERGENCY MEDICINE

## 2023-10-02 PROCEDURE — 85610 PROTHROMBIN TIME: CPT | Performed by: EMERGENCY MEDICINE

## 2023-10-02 PROCEDURE — 85025 COMPLETE CBC W/AUTO DIFF WBC: CPT | Performed by: EMERGENCY MEDICINE

## 2023-10-02 PROCEDURE — 84484 ASSAY OF TROPONIN QUANT: CPT | Performed by: EMERGENCY MEDICINE

## 2023-10-02 PROCEDURE — 71275 CT ANGIOGRAPHY CHEST: CPT

## 2023-10-02 PROCEDURE — 99285 EMERGENCY DEPT VISIT HI MDM: CPT

## 2023-10-02 PROCEDURE — 80053 COMPREHEN METABOLIC PANEL: CPT | Performed by: EMERGENCY MEDICINE

## 2023-10-02 PROCEDURE — 36415 COLL VENOUS BLD VENIPUNCTURE: CPT | Performed by: EMERGENCY MEDICINE

## 2023-10-02 PROCEDURE — 99285 EMERGENCY DEPT VISIT HI MDM: CPT | Performed by: EMERGENCY MEDICINE

## 2023-10-02 RX ORDER — POTASSIUM CHLORIDE 20 MEQ/1
40 TABLET, EXTENDED RELEASE ORAL ONCE
Status: COMPLETED | OUTPATIENT
Start: 2023-10-02 | End: 2023-10-02

## 2023-10-02 RX ADMIN — POTASSIUM CHLORIDE 40 MEQ: 1500 TABLET, EXTENDED RELEASE ORAL at 23:40

## 2023-10-02 RX ADMIN — IOHEXOL 100 ML: 350 INJECTION, SOLUTION INTRAVENOUS at 22:15

## 2023-10-02 NOTE — TELEPHONE ENCOUNTER
P/C had an episode of fainting today. Dizziness on 9/28/2023,9/29,9/30(did not pass out)  Did already follow up with her PCP. BP after event   104/53  HR 71  At 12 n 125/58 HR 67    Pt had been on Zofran and doxycycline  Was advised to stop doxycycline due to the vomiting. Pt was calling to make you aware she had an episode of fainting with dizziness and lightheaded.      She had the racing/pounding but PCP advised that could be from the Zofran    Please advise

## 2023-10-03 ENCOUNTER — PATIENT MESSAGE (OUTPATIENT)
Dept: CARDIOLOGY CLINIC | Facility: CLINIC | Age: 73
End: 2023-10-03

## 2023-10-03 VITALS
OXYGEN SATURATION: 97 % | SYSTOLIC BLOOD PRESSURE: 150 MMHG | HEART RATE: 74 BPM | RESPIRATION RATE: 22 BRPM | WEIGHT: 113.32 LBS | BODY MASS INDEX: 21.41 KG/M2 | TEMPERATURE: 98.2 F | DIASTOLIC BLOOD PRESSURE: 65 MMHG

## 2023-10-03 DIAGNOSIS — R55 VASOVAGAL SYNCOPE: Primary | ICD-10-CM

## 2023-10-03 LAB
2HR DELTA HS TROPONIN: 3 NG/L
ABO GROUP BLD: NORMAL
ATRIAL RATE: 77 BPM
CARDIAC TROPONIN I PNL SERPL HS: 9 NG/L
P AXIS: 73 DEGREES
PR INTERVAL: 154 MS
QRS AXIS: 82 DEGREES
QRSD INTERVAL: 94 MS
QT INTERVAL: 404 MS
QTC INTERVAL: 457 MS
RH BLD: POSITIVE
T WAVE AXIS: 56 DEGREES
VENTRICULAR RATE: 77 BPM

## 2023-10-03 PROCEDURE — 93010 ELECTROCARDIOGRAM REPORT: CPT | Performed by: INTERNAL MEDICINE

## 2023-10-03 NOTE — ED PROVIDER NOTES
Emergency Department Trauma Note  Lupe Azul 74 y.o. female MRN: [id number]  Unit/Bed#: ED 11/ED 11 Encounter: [id number]      Trauma Alert: Trauma Acuity: Trauma Evaluation  Model of Arrival:   via    Trauma Team: Current Providers  Attending Provider: Dorian Alcazar DO  Registered Nurse: Jordana Stoddard, DYLAN  Registered Nurse: Mustapha Christianson RN  Consultants:     None    History of Present Illness     Chief Complaint:   Chief Complaint   Patient presents with    Dizziness    Fall     Pt reports was in the bathroom this AM and has been dizzy since 9/28 when started an abx and zofran. Pt reports lowered herself to the floor feeling faint and reports waking up and having pain on left shoulder. Also c/o right sided head pain. Was seen at DO office at 4pm and was checked out with an abrasion to right side of head. Denies LOC     HPI:  Conrado Solano is a 74 y.o. female who presents with syncope and collapse. Patient reports that she has been on bactrim, which has been making her nauseated and vomit. Today, she was vomiting, and felt lightheaded, clammy, and like she was going to pass out, and then she did. She did lower herself to the floor first, but somehow hit the right side of her head where she has a bump and a small abrasion. Also complained of some mild left shoulder pain, constant, non-radiating, no change with movement. Denies any neck pain, back pain, loss of bowel or bladder control, dizziness, chest pain, shortness of breath. Patient went to the primary care provider this afternoon and was recommended to go to the ER to get checked. Patient reports that she has a pmhx of syncope in setting of vomiting previously. Mechanism:Details of Incident: fall, reports lowered self to fall with headstrike Injury Date: 10/02/23 Injury Time: 0800      HPI  Review of Systems   Constitutional:  Negative for chills and fever. Eyes:  Negative for photophobia and visual disturbance.    Respiratory: Negative for shortness of breath. Cardiovascular:  Negative for chest pain, palpitations and leg swelling. Gastrointestinal:  Positive for nausea and vomiting. Negative for abdominal pain, constipation and diarrhea. Genitourinary:  Negative for dysuria, flank pain and hematuria. Musculoskeletal:  Negative for back pain and neck pain. Skin:  Positive for wound. Neurological:  Positive for syncope, light-headedness and headaches. Negative for dizziness, weakness and numbness.        Historical Information     Immunizations:   Immunization History   Administered Date(s) Administered    COVID-19 MODERNA VACC 0.25 ML IM BOOSTER 04/11/2022    COVID-19 MODERNA VACC 0.5 ML IM 03/01/2021, 03/29/2021, 09/11/2021, 04/11/2022    COVID-19 Moderna Vac BIVALENT 12 Yr+ IM 0.5 ML 09/16/2022, 09/22/2022    INFLUENZA 11/15/2005, 09/01/2013, 10/02/2014, 10/15/2015, 10/08/2016, 10/09/2017, 10/02/2018, 10/08/2019, 10/01/2020, 10/10/2021, 09/29/2022    Influenza Split High Dose Preservative Free IM 10/01/2020    Influenza, seasonal, injectable, preservative free 10/08/2019    Pneumococcal Conjugate 13-Valent 06/21/2016    Pneumococcal Polysaccharide PPV23 05/20/2014    Rabies 03/12/2018, 03/15/2018, 03/19/2018, 03/26/2018    Rabies Immune Globulin 03/12/2018, 03/15/2018, 03/26/2018    Tdap 05/22/2012    Zoster 05/23/2013    Zoster Vaccine Recombinant 06/28/2019, 08/27/2019    influenza, trivalent, adjuvanted 10/08/2019       Past Medical History:   Diagnosis Date    Asthma     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 & Melanoma    Cataract     Coronary artery disease     Disease of thyroid gland     GERD (gastroesophageal reflux disease)     Hernia     Hyperlipidemia     Kidney stone     Melanoma ([address]) 2022       Family History   Problem Relation Age of Onset    Hypertension Father     Early death Father         age 43    Cancer Maternal Aunt         lip cancer    Vision loss Maternal Aunt     Early death Mother         age 61     Past Surgical History:   Procedure Laterality Date    CARDIAC CATHETERIZATION N/A 2023    Procedure: Cardiac Coronary Angiogram;  Surgeon: Yaneth Shaffer DO;  Location: [facility]; Service: Cardiology    CARDIAC CATHETERIZATION  2023    Procedure: Cardiac catheterization;  Surgeon: Yaneth Shaffer DO;  Location: [facility]; Service: Cardiology    CARDIAC CATHETERIZATION Left 2023    Procedure: Cardiac Left Heart Cath;  Surgeon: Yaneth Shaffer DO;  Location: [facility]; Service: Cardiology    COLONOSCOPY      COLONOSCOPY  20    EYE SURGERY      ROTATOR CUFF REPAIR Right     2022    THYROID SURGERY      UPPER GASTROINTESTINAL ENDOSCOPY  19     Social History     Tobacco Use    Smoking status: Never     Passive exposure: Past    Smokeless tobacco: Never   Vaping Use    Vaping Use: Never used   Substance Use Topics    Alcohol use: Not Currently    Drug use: Never     E-Cigarette/Vaping    E-Cigarette Use Never User      E-Cigarette/Vaping Substances    Nicotine No     THC No     CBD No     Flavoring No     Other No     Unknown No        Family History: non-contributory    Meds/Allergies   Prior to Admission Medications   Prescriptions Last Dose Informant Patient Reported? Taking?    Baclofen 5 MG TABS  Self Yes No   Sig: Take 1 tablet by mouth daily at bedtime   Cholecalciferol 50 MCG (2000 UT) CAPS  Self Yes No   Sig: Take 1 tablet by mouth daily   Multiple Vitamins-Minerals (PRESERVISION AREDS 2 PO)  Self Yes No   Sig: Take 1 capsule by mouth 2 (two) times a day with meals   Peak Flow Meter NANDA  Self Yes No   Si Act by Does not apply route 2 (two) times a day   albuterol (PROVENTIL HFA,VENTOLIN HFA) 90 mcg/act inhaler  Self No No   Sig: Inhale 2 puffs every 4 (four) hours as needed for shortness of breath   fluticasone (VERAMYST) 27.5 MCG/SPRAY nasal spray  Self No No   Si spray into each nostril daily   fluticasone-vilanterol (Breo Ellipta) 200-25 mcg/actuation inhaler  Self No No   Sig: Inhale 1 puff daily Rinse mouth after use.    levothyroxine 50 mcg tablet  Self Yes No   Sig: Take 50 mcg by mouth daily in the early morning   metoprolol tartrate (LOPRESSOR) 25 mg tablet   No No   Sig: Take 1 tablet (25 mg total) by mouth every 12 (twelve) hours   metroNIDAZOLE (METROGEL) 1 % gel  Self Yes No   Sig: Apply 1 application topically daily   nitroglycerin (NITROSTAT) 0.4 mg SL tablet   No No   Sig: Place 1 tablet (0.4 mg total) under the tongue every 5 (five) minutes as needed for chest pain   ondansetron (ZOFRAN) 4 mg tablet  Self Yes No   Sig: Take 4 mg by mouth every 8 (eight) hours as needed for nausea   polyethylene glycol-propylene glycol (SYSTANE) 0.4-0.3 %  Self Yes No   Sig: Apply 1 drop to eye Three times a day   raloxifene (EVISTA) 60 mg tablet  Self Yes No   Sig: Take 60 mg by mouth daily   rosuvastatin (CRESTOR) 20 MG tablet   No No   Sig: Take 1 tablet (20 mg total) by mouth daily with dinner   sucralfate (CARAFATE) 1 g tablet  Self Yes No   Sig: Take 1 g by mouth daily with lunch One tablet with lunch and dinner   sucralfate (CARAFATE) 1 g tablet  Self No No   Sig: Take Half a tablet after dinner   Patient not taking: Reported on 9/26/2023      Facility-Administered Medications: None       Allergies   Allergen Reactions    Amlodipine Edema     hands    Amoxicillin Abdominal Pain, GI Intolerance and Nausea Only    Clotrimazole Vomiting    Cyclobenzaprine Nausea Only    Naproxen Nausea Only    Nystatin Nausea Only    Other      Very sensitive to strong perfumes, sprays, smoke and exercise etc.  Recently dx with asthma    Tramadol Nausea Only       PHYSICAL EXAM    PE limited by: N/A    Objective   Vitals:   First set: Temperature: 98.2 °F (36.8 °C) (10/02/23 2134)  Pulse: 86 (10/02/23 2134)  Respirations: 18 (10/02/23 2134)  Blood Pressure: (!) 171/74 (10/02/23 2134)  SpO2: 98 % (10/02/23 2134)    Primary Survey:   (A) Airway: intact  (B) Breathing: CTABL  (C) Circulation: Pulses:   normal  (D) Disabliity:  GCS Total:  15  (E) Expose:  Completed    Secondary Survey: (Click on Physical Exam tab above)  Physical Exam  Vitals and nursing note reviewed. Constitutional:       General: She is not in acute distress. Appearance: Normal appearance. She is not ill-appearing, toxic-appearing or diaphoretic. HENT:      Head: Normocephalic and atraumatic. Mouth/Throat:      Mouth: Mucous membranes are moist.   Eyes:      Extraocular Movements: Extraocular movements intact. Conjunctiva/sclera: Conjunctivae normal.      Pupils: Pupils are equal, round, and reactive to light. Cardiovascular:      Rate and Rhythm: Normal rate and regular rhythm. Pulses: Normal pulses. Heart sounds: Normal heart sounds. No murmur heard. Pulmonary:      Effort: Pulmonary effort is normal. No respiratory distress. Breath sounds: Normal breath sounds. No stridor. No wheezing, rhonchi or rales. Chest:      Chest wall: No tenderness. Abdominal:      General: Bowel sounds are normal. There is no distension. Palpations: Abdomen is soft. Tenderness: There is no abdominal tenderness. There is no guarding or rebound. Musculoskeletal:      Cervical back: Neck supple. Right lower leg: No edema. Left lower leg: No edema. Comments: No midline c-t-l-spine tenderness, step offs, deformities  Pelvis stable    Skin:     General: Skin is warm and dry. Neurological:      General: No focal deficit present. Mental Status: She is alert and oriented to person, place, and time. Mental status is at baseline. Psychiatric:         Mood and Affect: Mood normal.         Behavior: Behavior normal.         Cervical spine cleared by clinical criteria?  Yes     Invasive Devices       Peripheral Intravenous Line  Duration             Peripheral IV 10/02/23 Right Antecubital <1 day                    Lab Results:   Results Reviewed       Procedure Component Value Units Date/Time    HS Troponin I 2hr [[id number]]  (Normal) Collected: 10/02/23 2348    Lab Status: Final result Specimen: Blood from Arm, Right Updated: 10/03/23 0017     hs TnI 2hr 9 ng/L      Delta 2hr hsTnI 3 ng/L     HS Troponin I 4hr [[id number]]     Lab Status: No result Specimen: Blood     HS Troponin 0hr (reflex protocol) [[id number]]  (Normal) Collected: 10/02/23 2154    Lab Status: Final result Specimen: Blood from Arm, Right Updated: 10/02/23 2226     hs TnI 0hr 6 ng/L     Comprehensive metabolic panel [[id number]]  (Abnormal) Collected: 10/02/23 2154    Lab Status: Final result Specimen: Blood from Arm, Right Updated: 10/02/23 2218     Sodium 135 mmol/L      Potassium 3.2 mmol/L      Chloride 101 mmol/L      CO2 27 mmol/L      ANION GAP 7 mmol/L      BUN 15 mg/dL      Creatinine 0.68 mg/dL      Glucose 112 mg/dL      Calcium 9.5 mg/dL      AST 20 U/L      ALT 16 U/L      Alkaline Phosphatase 60 U/L      Total Protein 6.7 g/dL      Albumin 4.2 g/dL      Total Bilirubin 0.73 mg/dL      eGFR 85 ml/min/1.73sq m     Narrative:      [place] guidelines for Chronic Kidney Disease (CKD):     Stage 1 with normal or high GFR (GFR > 90 mL/min/1.73 square meters)    Stage 2 Mild CKD (GFR = 60-89 mL/min/1.73 square meters)    Stage 3A Moderate CKD (GFR = 45-59 mL/min/1.73 square meters)    Stage 3B Moderate CKD (GFR = 30-44 mL/min/1.73 square meters)    Stage 4 Severe CKD (GFR = 15-29 mL/min/1.73 square meters)    Stage 5 End Stage CKD (GFR <15 mL/min/1.73 square meters)  Note: GFR calculation is accurate only with a steady state creatinine    Lipase [[id number]]  (Normal) Collected: 10/02/23 2154    Lab Status: Final result Specimen: Blood from Arm, Right Updated: 10/02/23 2218     Lipase 22 u/L     Protime-INR [[id number]]  (Normal) Collected: 10/02/23 2154    Lab Status: Final result Specimen: Blood from Arm, Right Updated: 10/02/23 6862     Protime 13.2 seconds      INR 0.93    APTT [[id number]]  (Normal) Collected: 10/02/23 2154    Lab Status: Final result Specimen: Blood from Arm, Right Updated: 10/02/23 2213     PTT 24 seconds     CBC and differential [[id number]]  (Abnormal) Collected: 10/02/23 2154    Lab Status: Final result Specimen: Blood from Arm, Right Updated: 10/02/23 2200     WBC 12.73 Thousand/uL      RBC 4.37 Million/uL      Hemoglobin 13.2 g/dL      Hematocrit 38.8 %      MCV 89 fL      MCH 30.2 pg      MCHC 34.0 g/dL      RDW 13.1 %      MPV 9.3 fL      Platelets 006 Thousands/uL      nRBC 0 /100 WBCs      Neutrophils Relative 67 %      Immat GRANS % 1 %      Lymphocytes Relative 21 %      Monocytes Relative 11 %      Eosinophils Relative 0 %      Basophils Relative 0 %      Neutrophils Absolute 8.58 Thousands/µL      Immature Grans Absolute 0.07 Thousand/uL      Lymphocytes Absolute 2.65 Thousands/µL      Monocytes Absolute 1.34 Thousand/µL      Eosinophils Absolute 0.05 Thousand/µL      Basophils Absolute 0.04 Thousands/µL                    Imaging Studies:   Direct to CT: Yes  TRAUMA - CT head wo contrast   Final Result by Evan Paredes DO (10/02 2238)      No acute intracranial abnormality. Workstation performed: [id number]         TRAUMA - CT spine cervical wo contrast   Final Result by Evan Paredes DO (10/02 2239)      No cervical spine fracture or traumatic malalignment. Workstation performed: [id number]         PE Study with CT abdomen & pelvis with contrast   Final Result by Evan Paredes DO (10/02 2238)      No acute traumatic injury. No acute pulmonary embolus. Indeterminate hepatic lesion in the left lobe. Recommend multiphase liver protocol CT or MRI for complete evaluation. Gastric wall thickening. Correlate for gastritis. Mild endometrial thickening and calcification, poorly visualized by CT. Recommend outpatient pelvic ultrasound and outpatient gynecologic evaluation.          The study was marked in [facility] for immediate notification. Workstation performed: [id number]         XR Trauma chest portable   Final Result by Amira Hernandez MD (10/02 2157)      No acute pulmonary pathology. No obvious displaced fractures within limitation of supine AP chest technique. Workstation performed: [id number]               Procedures  ECG 12 Lead Documentation Only    Date/Time: 10/3/2023 12:00 AM    Performed by: Carlito Wiggins DO  Authorized by: Carlito Wiggins DO    Indications / Diagnosis:  Syncope  ECG reviewed by me, the ED Provider: yes    Patient location:  ED  Previous ECG:     Previous ECG:  Compared to current    Comparison ECG info:  10/2/2023    Similarity:  No change  Interpretation:     Interpretation: normal    Rate:     ECG rate:  77    ECG rate assessment: normal    Rhythm:     Rhythm: sinus rhythm    Ectopy:     Ectopy: PVCs      PVCs:  Infrequent  QRS:     QRS axis:  Normal  Conduction:     Conduction: normal    ST segments:     ST segments:  Normal  T waves:     T waves: normal    Comments:      YULI [id number]           ED Course  ED Course as of 10/03/23 0040   Mon Oct 02, 2023   2311 Reviewed all of the incidental findings and recommendation for outpatient follow up and imaging. Also reviewed hypokalemia which will be repleted presently, but no repletion for home, as this is acute in setting of N/V and should follow up with pcp to recheck labs in 1 week. Reviewed that syncope likely vasovagal in setting of vomiting, which patient has previously experienced, but will wait on 2nd troponin to result. e Oct 03, 2023   0023 Delta 2hr hsTnI: 3           Medical Decision Making  Assessment and Plan:   Syncope and collapse in setting of vomiting. Syncope cause vasovagal v. Cardiac.  Will get EKG/ troponin to evaluate for arrhythmia/ ischemia; labs to assess for electrolyte abnormalities, leukocytosis, anemia, kidney and liver function; urinalysis to rule out urinary tract infection; CT scan of the abdomen/pelvis to evaluate for cholecystitis/pancreatitis/gastritis/colitis. Syncope and collapse-CTA PE study to rule out pulmonary embolism as cause of syncopal episode as well as to rule out any thoracic trauma  Syncope and collapse with head strike and headache. CT scan of the head to rule out intracranial hemorrhage. 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 Plan for the vasovagal syncope was to get a ZIO monitor if the patient had any recurrent syncope. Discussed with the patient that since she has recurrent syncope, she needs to call her cardiologist for a reevaluation and to get the ZIO monitor. Also discussed with patient all of the incidental findings and necessary follow-ups including PCP/gastroenterology/OB/GYN. Patient verbalized understanding of all discharge instructions and return precautions. All questions were answered. Amount and/or Complexity of Data Reviewed  Labs: ordered. Decision-making details documented in ED Course. Radiology: ordered. Risk  Prescription drug management.                 Disposition  [facility] Transfer: No  Final diagnoses:   Abnormal CT of the abdomen   Syncope   Minor head injury, initial encounter   Nausea and vomiting   Hypokalemia     Time reflects when diagnosis was documented in both MDM as applicable and the Disposition within this note       Time User Action Codes Description Comment    10/3/2023 12:10 AM Remberto Free [R93.5] Abnormal CT of the abdomen     10/3/2023 12:10 AM Castro Theo Add [R55] Syncope     10/3/2023 12:10 AM Castro Theo Add [X81.80EA] Minor head injury, initial encounter     10/3/2023 12:10 AM Castro Theo Add [R11.2] Nausea and vomiting     10/3/2023 12:11 AM Castro Theo Add [E87.6] Hypokalemia           ED Disposition       ED Disposition   Discharge    Condition   Stable    Date/Time   Tue Oct 3, 2023 12:25 AM    Comment   Lupe Azul discharge to home/self care.                    Follow-up Information       Follow up With Specialties Details Why Contact Info Additional Information    Kristin Novoa,  Family Medicine Schedule an appointment as soon as possible for a visit in 3 days for re-evaluation [address] [facility] [address] [place] [facility] Gastroenterology Schedule an appointment as soon as possible for a visit in 1 week for re-evaluation [address] [facility] [place] [phone]  [address] [facility], [address] [facility] [address], [facility]  [phone]     27[address] [facility] Emergency Department Emergency Medicine Go to  As needed, If symptoms worsen, for re-evaluation [address] [facility] [phone]  [address] [facility] Emergency Department, [address] [facility], [facility] ER, [address] [facility]    [address] [facility] Obstetrics and Gynecology Schedule an appointment as soon as possible for a visit in 1 week for re-evaluation [place] [phone]  [address] [facility], [address] [facility], [place], [place], [phone], [address] [facility] Cardiology Schedule an appointment as soon as possible for a visit in 1 week for re-evaluation [place]el [place] [phone]  [address] [facility], Ismael Lundberg [address], [place], [place], [address] [facility]          Patient's Medications   Discharge Prescriptions    No medications on file         PDMP Review       None            ED Provider  Electronically Signed by           Yohannes Hand DO  10/03/23 0040

## 2023-10-06 ENCOUNTER — TELEPHONE (OUTPATIENT)
Dept: PULMONOLOGY | Facility: CLINIC | Age: 73
End: 2023-10-06

## 2023-10-06 NOTE — TELEPHONE ENCOUNTER
Patient called asking to speak with the nurse, she is starting to have some trouble with her breathing and her inhalers are not giving her enough relief currently. She was dealing with nausea over the weekend and had been taking Doxycycline and Zofran to help. She had vomited many times and come the next day she went to the ER as she had fainted and hit her head. During that time she had to stop taking her Breo inhaler on Sunday and Monday due to the nausea and not being able to handle the medication on a empty stomach. She started back on her Breo and Albuterol inhalers on Tuesday. She has become more short of breath as of this week. She cannot do much besides rest with out having trouble. She does have a follow up apt with her PCP today at 1:30pm as well.  Please advise

## 2023-10-06 NOTE — TELEPHONE ENCOUNTER
Patient is calling again, she wanted to share that after she spoke with me this morning her breathing is starting to feel a bit better currently. She said she sat down for awhile and used her Albuterol inhaler. It took about a half hour to start working and now feels relief. She feels by the time it wears off she will be feeling the same again. She isn't sure if she just should be taking it easy or if there is a different issue.

## 2023-10-19 RX ORDER — DOXYCYCLINE HYCLATE 100 MG
TABLET ORAL
COMMUNITY
Start: 2023-09-27

## 2023-10-19 RX ORDER — SULFAMETHOXAZOLE AND TRIMETHOPRIM 800; 160 MG/1; MG/1
TABLET ORAL
COMMUNITY
Start: 2023-09-30

## 2023-10-23 ENCOUNTER — TELEPHONE (OUTPATIENT)
Dept: CARDIOLOGY CLINIC | Facility: CLINIC | Age: 73
End: 2023-10-23

## 2023-10-23 NOTE — TELEPHONE ENCOUNTER
Spoke with pt and informed her that Dr. Bogdan Laura would like to see her in 1 year per OV from 09/26. We will call with zio results after they are reviewed.

## 2023-10-23 NOTE — TELEPHONE ENCOUNTER
Skip Kellogg and my YOB: 1950 on the patient of Doctor Marta and I'm calling because I finished wearing my Zio monitor and mailed it back on Saturday the 21st of October. I was told to let them know. I'm not sure about a follow up appointment if I need to make that or if somebody will contact me about that. So anyway, that's all for now. Thank you. Bye. Bye. My home phone number is [phone]. That's my cell number. [facility], [phone].  Bye.

## 2023-10-24 ENCOUNTER — OFFICE VISIT (OUTPATIENT)
Dept: PULMONOLOGY | Facility: CLINIC | Age: 73
End: 2023-10-24
Payer: MEDICARE

## 2023-10-24 VITALS
TEMPERATURE: 96.6 F | WEIGHT: 116.1 LBS | BODY MASS INDEX: 21.92 KG/M2 | SYSTOLIC BLOOD PRESSURE: 122 MMHG | DIASTOLIC BLOOD PRESSURE: 66 MMHG | OXYGEN SATURATION: 99 % | HEART RATE: 74 BPM | HEIGHT: 61 IN | RESPIRATION RATE: 16 BRPM

## 2023-10-24 DIAGNOSIS — J45.40 MODERATE PERSISTENT ASTHMA WITHOUT COMPLICATION: ICD-10-CM

## 2023-10-24 DIAGNOSIS — J45.50 SEVERE PERSISTENT ASTHMA WITHOUT COMPLICATION: Primary | ICD-10-CM

## 2023-10-24 PROCEDURE — 99215 OFFICE O/P EST HI 40 MIN: CPT | Performed by: INTERNAL MEDICINE

## 2023-10-24 RX ORDER — FLUTICASONE FUROATE, UMECLIDINIUM BROMIDE AND VILANTEROL TRIFENATATE 100; 62.5; 25 UG/1; UG/1; UG/1
1 POWDER RESPIRATORY (INHALATION) DAILY
Qty: 180 BLISTER | Refills: 5 | Status: SHIPPED | OUTPATIENT
Start: 2023-10-24 | End: 2025-04-16

## 2023-10-24 NOTE — ASSESSMENT & PLAN NOTE
Due to her persistent symptoms and increased use of her rescue inhaler, we decided to escalate her treatment to Trelegy 200, 1 puff daily. I given patient a sample given her history of adverse effects to medication and if she tolerates it well also written prescription. I also reviewed her most recent chest x-ray. We talked at length about her chest pain and how to differentiate between cardiac and pulmonary and GI causes. It appears that these episodes are likely related to bronchospasm.

## 2023-10-24 NOTE — PROGRESS NOTES
Assessment/Plan: Moderate persistent asthma without complication  Due to her persistent symptoms and increased use of her rescue inhaler, we decided to escalate her treatment to Trelegy 200, 1 puff daily. I given patient a sample given her history of adverse effects to medication and if she tolerates it well also written prescription. I also reviewed her most recent chest x-ray. We talked at length about her chest pain and how to differentiate between cardiac and pulmonary and GI causes. It appears that these episodes are likely related to bronchospasm. Diagnoses and all orders for this visit:    Severe persistent asthma without complication  -     fluticasone-umeclidinium-vilanterol (Trelegy Ellipta) 100-62.5-25 mcg/actuation inhaler; Inhale 1 puff daily Rinse mouth after use. Moderate persistent asthma without complication    Other orders  -     doxycycline hyclate (VIBRA-TABS) 100 mg tablet; TAKE 1 TABLET BY MOUTH TWICE A DAY FOR 7 DAYS  -     mupirocin (BACTROBAN) 2 % ointment; APPLY 1 APPLICATION TOPICALLY TWICE A DAY FOR 5 DAYS  -     sulfamethoxazole-trimethoprim (BACTRIM DS) 800-160 mg per tablet; TAKE 1 TABLET BY MOUTH TWICE A DAY FOR 5 DAYS          Subjective:      Patient ID: Chetna Toledo is a 74 y.o. female. Tomas Gonzalez had some issues at the beginning of the month related to an infected fingernail which resulted in doxycycline which resulted in nausea vomiting and inability to take her inhalers which caused her to be short of breath. She seems to be back on track however she still using her rescue inhaler more frequently than she would like. primary symptoms  Pertinent negatives include no chest pain, fever, headaches, myalgias or sore throat.        The following portions of the patient's history were reviewed and updated as appropriate: allergies, current medications, past family history, past medical history, past social history, past surgical history, and problem list.    Review of Systems   Constitutional:  Negative for appetite change and fever. HENT:  Positive for postnasal drip. Negative for ear pain, rhinorrhea, sneezing, sore throat and trouble swallowing. Respiratory:  Positive for shortness of breath. Cardiovascular:  Negative for chest pain. Musculoskeletal:  Negative for myalgias. Neurological:  Negative for headaches. Objective:      /66 (BP Location: Right arm, Patient Position: Sitting, Cuff Size: Adult)   Pulse 74   Temp (!) 96.6 °F (35.9 °C) (Tympanic)   Resp 16   Ht 5' 1" (1.549 m)   Wt 52.7 kg (116 lb 1.6 oz)   SpO2 99%   BMI 21.94 kg/m²          Physical Exam  Constitutional:       Appearance: She is well-developed. HENT:      Head: Normocephalic. Eyes:      Pupils: Pupils are equal, round, and reactive to light. Cardiovascular:      Rate and Rhythm: Normal rate. Heart sounds: No murmur heard. Pulmonary:      Effort: Pulmonary effort is normal. No respiratory distress. Breath sounds: Normal breath sounds. No wheezing or rales. Abdominal:      Palpations: Abdomen is soft. Musculoskeletal:         General: Normal range of motion. Cervical back: Normal range of motion and neck supple. Skin:     General: Skin is warm and dry. Neurological:      Mental Status: She is alert and oriented to person, place, and time.            Answers submitted by the patient for this visit:  Pulmonology Questionnaire (Submitted on 10/21/2023)  Chief Complaint: Primary symptoms  Do you experience frequent throat clearing?: Yes  Chronicity: chronic  When did you first notice your symptoms?: more than 1 year ago  How often do your symptoms occur?: every several days  Since you first noticed this problem, how has it changed?: waxing and waning  Do you have shortness of breath that occurs with effort or exertion?: Yes  Do you have ear congestion?: No  Do you have heartburn?: No  Do you have fatigue?: No  Do you have nasal congestion?: No  Do you have shortness of breath when lying flat?: No  Do you have shortness of breath when you wake up?: No  Do you have sweats?: No  Have you experienced weight loss?: No  Which of the following makes your symptoms worse?: change in weather, exercise, exposure to fumes, exposure to smoke, strenuous activity  Which of the following makes your symptoms better?: beta-agonist

## 2023-10-31 ENCOUNTER — TELEPHONE (OUTPATIENT)
Dept: PULMONOLOGY | Facility: CLINIC | Age: 73
End: 2023-10-31

## 2023-10-31 NOTE — TELEPHONE ENCOUNTER
Pt calling in stating she has been trying the samples of trelegyy but it is making her very nauseous and when she gets nauseous she does have a history of passing out when getting nauseous so she is very concerned with this side effects of trelegyy and is asking if Dr. Anuj Crowley would be ok with her sticking with the breo.

## 2023-11-02 ENCOUNTER — CLINICAL SUPPORT (OUTPATIENT)
Dept: CARDIOLOGY CLINIC | Facility: CLINIC | Age: 73
End: 2023-11-02
Payer: MEDICARE

## 2023-11-02 ENCOUNTER — TELEPHONE (OUTPATIENT)
Dept: CARDIOLOGY CLINIC | Facility: CLINIC | Age: 73
End: 2023-11-02

## 2023-11-02 DIAGNOSIS — R55 VASOVAGAL SYNCOPE: ICD-10-CM

## 2023-11-02 PROCEDURE — 93248 EXT ECG>7D<15D REV&INTERPJ: CPT | Performed by: INTERNAL MEDICINE

## 2023-11-08 ENCOUNTER — TELEPHONE (OUTPATIENT)
Dept: CARDIOLOGY CLINIC | Facility: CLINIC | Age: 73
End: 2023-11-08

## 2023-11-08 NOTE — TELEPHONE ENCOUNTER
Pt returned call regarding zio results.    I discussed Dr Dylan Garcia message with her that results mostly normal. She verbalized understanding

## 2023-11-08 NOTE — RESULT ENCOUNTER NOTE
Please call patient. She had a small amount of extra heart beats which may cause occasional palpitations but no significant arrhythmias that would relate to fainting. Her heart rhythm was normal during her episodes that she logged symptoms. The test is overall reassuring as far as the heart goes. The last fainting episode was also triggered by vomiting like prior episodes.

## 2023-11-14 ENCOUNTER — EVALUATION (OUTPATIENT)
Facility: CLINIC | Age: 73
End: 2023-11-14
Payer: MEDICARE

## 2023-11-14 DIAGNOSIS — R42 DIZZINESS: Primary | ICD-10-CM

## 2023-11-14 PROCEDURE — 97162 PT EVAL MOD COMPLEX 30 MIN: CPT

## 2023-11-14 NOTE — PROGRESS NOTES
PT Evaluation / ONE VISIT ONLY    Today's date: 2023  Patient name: Rui Garcia  : 1950  MRN: [id number]  Referring provider: Andrea Castellanos DO  Dx:   Encounter Diagnosis     ICD-10-CM    1. Dizziness  R42           Start Time: 66  Stop Time: 945  Total time in clinic (min): 60 minutes    Assessment  Assessment details: Patient presents to PT with recent history of dizziness post syncopal episode resulting in fall and possible concussion. Testing for BPPV (-) for nystagmus or symptoms in DHP and Roll tests. FGA scoring 30/30 and CASILLAS scoring of 56/56 denote no limitations for stability and balance. Patient has a history of cervical restriction and HA responding well to postural correction, reinforced these HEP with good demonstration. Given no current symptoms of dizziness and excellent balance scoring, no further treatment is indicated at this time. Discussed at length syncopal episodes, with encouragement to pursue further assessment with her physician to promote increased safety. Patient has been instructed to contact PT should any symptoms return or any questions or concerns arise. Patient is in agreement with current plan and voiced understanding to all instructions. Thank you for this referral.    Goals  No further treatment indicated at this time. Plan  Patient would benefit from: PT eval  Treatment plan discussed with: patient        Subjective Evaluation    History of Present Illness  Date of onset: 10/2/2023  Mechanism of injury: Patient has history of nausea with anti-biotics and has had episodes of fainting associated with this. She had an episode in July, going to the ER with full evaluation and no findings noted. Patient had a cuticle infection and was placed on antibiotic.  3 nights after taking the antibiotic, she vomited and experienced a syncopal episode. She regained consciousness and noted right sided head pain and left elbow pain.   By the evening she noted increased head pressure and pain. She went to the ER with further testing performed. CT (-). She experienced onset of dizziness after this with slight lightheadedness noted. She did have an episode of spinning 6 weeks ago after bending forward. She has not had any of these symptoms since. Patient Goals  Patient goal: clarity on dizziness, to determine if further treatment is needed to return to normal ADL tasks  Pain  Current pain ratin    Social Support  Steps to enter house: yes  2  Stairs in house: yes   14  Lives in: multiple-level home  Lives with: spouse    Employment status: not working        Objective     Concurrent Complaints  Positive for tinnitus and hearing loss. Negative for headaches, nausea/motion sickness, visual change, memory loss, aural fullness and poor concentration  Neuro Exam:     Dizziness  Negative for vertigo, motion sickness, light-headedness, rocking or swaying, diplopia and floating or swimming. Exacerbating factors  Negative for bending over, rolling in bed, looking up, walking, turning head and supine to/from sitting. Symptoms   Intensity at best: 0/10    Headaches   Patient reports headaches: No.     Cervical exam   Ligament Laxity Testing   Alar ligament: WNL  Sharp Milena: WNL  Modified VBI   Left: asymptomatic  Right: asymptomatic  Seated posture: forward head posture    Oculomotor exam   Oculomotor ROM: WNL  Resting nystagmus: not present   Gaze holding nystagmus: not present left  and not present right  Smooth pursuits: within normal limits    Positional testing   Sylvia-Hallpike   Left posterior canal: WNL  Right posterior canal: WNL  Roll test   Left horizontal canal: WNL  Right horizontal canal: WNL    Sensation   Light touch LE: left WNL and right WNL    Functional outcomes   Functional outcome gait comment: Normal hillary, no abnormal veering or hesitation noted.              Precautions: h/o cataract sx, h/o migraine, h/o syncopal episodes, h/o dizziness, neuropathy      TESTING 11/14            CASILLAS 56/56            FGA 30/30            DHP/ Roll test (-)                         Neuro Re-Ed                                                                                                        Ther Ex                                                                                                                     Ther Activity                                       Gait Training                                       Modalities

## 2023-11-29 DIAGNOSIS — I20.89 CARDIAC SYNDROME X: ICD-10-CM

## 2023-11-30 RX ORDER — ROSUVASTATIN CALCIUM 20 MG/1
20 TABLET, COATED ORAL
Qty: 90 TABLET | Refills: 3 | Status: SHIPPED | OUTPATIENT
Start: 2023-11-30 | End: 2023-12-07 | Stop reason: SDUPTHER

## 2023-12-07 DIAGNOSIS — I20.89 CARDIAC SYNDROME X: ICD-10-CM

## 2023-12-07 RX ORDER — ROSUVASTATIN CALCIUM 20 MG/1
20 TABLET, COATED ORAL
Qty: 90 TABLET | Refills: 3 | Status: SHIPPED | OUTPATIENT
Start: 2023-12-07

## 2023-12-07 NOTE — TELEPHONE ENCOUNTER
Patient called states she spoke to [facility] in [place] and they did not receive the script for rosuvastatin that was sent over on 11/29 and she is asking that it gets resent

## 2023-12-23 ENCOUNTER — NURSE TRIAGE (OUTPATIENT)
Dept: PULMONOLOGY | Facility: CLINIC | Age: 73
End: 2023-12-23

## 2023-12-23 DIAGNOSIS — J45.40 MODERATE PERSISTENT ASTHMA WITHOUT COMPLICATION: Primary | ICD-10-CM

## 2023-12-23 RX ORDER — PREDNISONE 20 MG/1
40 TABLET ORAL DAILY
Qty: 10 TABLET | Refills: 0 | Status: SHIPPED | OUTPATIENT
Start: 2023-12-23 | End: 2023-12-28

## 2023-12-23 NOTE — TELEPHONE ENCOUNTER
On call provider contacted per pt's request. Per provider will send prednisone 40 mg daily for five days.

## 2023-12-23 NOTE — TELEPHONE ENCOUNTER
"Regarding: SOB  ----- Message from Jeanette Salazar sent at 12/23/2023 10:28 AM EST -----  \" I have Asthma, and had been doing very good, but I cleaned by Air Purifier and did not wear my mask, I inhaled some dust and the last few days I am more SOB. \"    "

## 2023-12-23 NOTE — TELEPHONE ENCOUNTER
"Reason for Disposition  • [1] MILD asthma attack (e.g., no SOB at rest, mild SOB with walking, speaks normally in sentences, mild wheezing) AND [2]  persists > 24 hours on appropriate treatment    Answer Assessment - Initial Assessment Questions  1. RESPIRATORY STATUS: \"Describe your breathing?\" (e.g., wheezing, shortness of breath, unable to speak, severe coughing)       SOB with exertion    2. ONSET: \"When did this asthma attack begin?\"       12/7 when cleaning air purifier without a mask. Felt like she inhaled something. Recovered with albuterol inhaler. Tried blowing up a balloon and powdered irritated lungs 12/17 and SOB returned    3. TRIGGER: \"What do you think triggered this attack?\" (e.g., URI, exposure to pollen or other allergen, tobacco smoke)       Powder from balloon    4. PEAK EXPIRATORY FLOW RATE (PEFR): \"Do you use a peak flow meter?\" If Yes, ask: \"What's the current peak flow? What's your personal best peak flow?\"       Yes, hasn't been bad. Lowest was 340, good normal 350 \"slightly below normal to normal\" for reference ED is 175    5. SEVERITY: \"How bad is this attack?\"     - MILD: No SOB at rest, mild SOB with walking, speaks normally in sentences, can lay down, no retractions, pulse < 100. (GREEN Zone: PEFR %)    - MODERATE: SOB at rest, SOB with minimal exertion and prefers to sit, cannot lie down flat, speaks in phrases, mild retractions, audible wheezing, pulse 100-120. (YELLOW Zone: PEFR 50-80%)     - SEVERE: Very SOB at rest, speaks in single words, struggling to breathe, sitting hunched forward, retractions, usually loud wheezing, sometimes minimal wheezing because of decreased air movement, pulse > 120. (RED Zone: PEFR < 50%).       Mild    6. MEDICATIONS (Inhaler or nebs): \"What are your asthma medications?\" and \"What treatments have you given so far?\"     - Quick-relief: albuterol, metaproterenol, salbutamol, or other inhaled or nebulized beta-agonist medicines    - " "Long-term-control: steroids, cromolyn, or other anti-inflammatory medicines.      Breo daily, albuterol prn    7. OTHER SYMPTOMS: \"Do you have any other symptoms? (e.g., runny nose, chest pain, fever)      Congestion in the am, clears when coughing. Chest tenderness running along sternum- \"always happens when I'm short of breath.\"    Bp 115/55 68hr    Protocols used: Asthma Attack-ADULT-AH    "

## 2024-02-08 DIAGNOSIS — J45.30 MILD PERSISTENT ASTHMA WITHOUT COMPLICATION: ICD-10-CM

## 2024-02-08 RX ORDER — FLUTICASONE FUROATE AND VILANTEROL TRIFENATATE 200; 25 UG/1; UG/1
POWDER RESPIRATORY (INHALATION)
Qty: 60 EACH | Refills: 5 | Status: SHIPPED | OUTPATIENT
Start: 2024-02-08

## 2024-02-09 ENCOUNTER — OFFICE VISIT (OUTPATIENT)
Age: 74
End: 2024-02-09
Payer: MEDICARE

## 2024-02-09 VITALS
TEMPERATURE: 98 F | WEIGHT: 119 LBS | OXYGEN SATURATION: 96 % | BODY MASS INDEX: 22.47 KG/M2 | SYSTOLIC BLOOD PRESSURE: 124 MMHG | DIASTOLIC BLOOD PRESSURE: 88 MMHG | HEART RATE: 76 BPM | HEIGHT: 61 IN

## 2024-02-09 DIAGNOSIS — J45.50 SEVERE PERSISTENT ASTHMA WITHOUT COMPLICATION: ICD-10-CM

## 2024-02-09 DIAGNOSIS — J45.40 MODERATE PERSISTENT ASTHMA WITHOUT COMPLICATION: Primary | ICD-10-CM

## 2024-02-09 DIAGNOSIS — J45.30 MILD PERSISTENT ASTHMA, UNSPECIFIED WHETHER COMPLICATED: ICD-10-CM

## 2024-02-09 PROCEDURE — 99214 OFFICE O/P EST MOD 30 MIN: CPT | Performed by: INTERNAL MEDICINE

## 2024-02-09 RX ORDER — ALBUTEROL SULFATE 90 UG/1
2 AEROSOL, METERED RESPIRATORY (INHALATION) EVERY 4 HOURS PRN
Qty: 18 G | Refills: 3 | Status: SHIPPED | OUTPATIENT
Start: 2024-02-09

## 2024-02-09 NOTE — TELEPHONE ENCOUNTER
Reason for call:   [x] Refill   [] Prior Auth  [] Other:     Office:   [] PCP/Provider -   [x] Specialty/Provider - pulmonology    Medication: albuterol 90 mcg/act inhaler 2 puff inhalation every 4 hours prn    Quantity: 18g    Pharmacy: [facility]/pharmacy #[id number] [place], [place] - [address] [phone]     Does the patient have enough for 3 days?   [x] Yes   [] No - Send as HP to POD

## 2024-02-09 NOTE — ASSESSMENT & PLAN NOTE
After reviewing inhaler technique again I think that part of the reason for Lupe's lack of control is how she is inhaling her Breo.  I reinforced proper inhaler technique and will monitor over the next 2 weeks before escalating therapy.  I reviewed her most recent x-ray and PFTs.

## 2024-02-21 ENCOUNTER — TELEPHONE (OUTPATIENT)
Dept: NEUROLOGY | Facility: CLINIC | Age: 74
End: 2024-02-21

## 2024-02-28 ENCOUNTER — HOSPITAL ENCOUNTER (OUTPATIENT)
Dept: PULMONOLOGY | Facility: HOSPITAL | Age: 74
Discharge: HOME | End: 2024-02-28
Attending: INTERNAL MEDICINE
Payer: MEDICARE

## 2024-02-28 DIAGNOSIS — J45.40 MODERATE PERSISTENT ASTHMA WITHOUT COMPLICATION: ICD-10-CM

## 2024-02-28 PROCEDURE — 94726 PLETHYSMOGRAPHY LUNG VOLUMES: CPT

## 2024-02-28 PROCEDURE — 94010 BREATHING CAPACITY TEST: CPT

## 2024-02-28 PROCEDURE — 94726 PLETHYSMOGRAPHY LUNG VOLUMES: CPT | Performed by: INTERNAL MEDICINE

## 2024-02-28 PROCEDURE — 94760 N-INVAS EAR/PLS OXIMETRY 1: CPT

## 2024-02-28 PROCEDURE — 94729 DIFFUSING CAPACITY: CPT

## 2024-02-28 PROCEDURE — 94010 BREATHING CAPACITY TEST: CPT | Performed by: INTERNAL MEDICINE

## 2024-02-28 PROCEDURE — 94729 DIFFUSING CAPACITY: CPT | Performed by: INTERNAL MEDICINE

## 2024-02-29 ENCOUNTER — OFFICE VISIT (OUTPATIENT)
Dept: NEUROLOGY | Facility: CLINIC | Age: 74
End: 2024-02-29
Payer: MEDICARE

## 2024-02-29 VITALS
DIASTOLIC BLOOD PRESSURE: 67 MMHG | TEMPERATURE: 97.3 F | WEIGHT: 120 LBS | SYSTOLIC BLOOD PRESSURE: 155 MMHG | OXYGEN SATURATION: 100 % | BODY MASS INDEX: 22.66 KG/M2 | HEIGHT: 61 IN | HEART RATE: 68 BPM

## 2024-02-29 DIAGNOSIS — R55 VASOVAGAL SYNCOPE: ICD-10-CM

## 2024-02-29 DIAGNOSIS — G62.9 NEUROPATHY: ICD-10-CM

## 2024-02-29 DIAGNOSIS — G43.109 OCULAR MIGRAINE: Primary | ICD-10-CM

## 2024-02-29 PROCEDURE — 99214 OFFICE O/P EST MOD 30 MIN: CPT | Performed by: NURSE PRACTITIONER

## 2024-02-29 NOTE — ASSESSMENT & PLAN NOTE
Symptoms of numbness and tingling in the feet since 2018 with no significant progression since onset.  EMG in the past was reported as normal.  Workup in the past has been unrevealing.  Her exam is stable today.  We did again discuss considering repeat EMG, skin biopsy to assess for small fiber neuropathy however given no significant progression of symptoms over several years and the fact that these results would not  continue to defer today.  Etiology of her neuropathy is likely idiopathic given unrevealing workup.    In the past there was a question of her syncopal episodes related to autonomic neuropathy.  In the past she did not have orthostatic blood pressures in the hospital or in the office.  Patient's episodes of syncope are likely vasovagal in nature given she has GI upset/nausea/vomiting with all of her syncopal episodes.     Plan follow-up in 6 months, however if her symptoms are overall stable she may cancel appointment and follow-up as needed. To contact the office sooner with any concerns or worsening symptoms.

## 2024-02-29 NOTE — PROGRESS NOTES
Patient ID: Lupe Azul is a 73 y.o. female.    Assessment/Plan:    Ocular migraine  Hx of ocular migraines for 15 years. These occur once every few weeks therefore not require preventative medication.  She will continue to follow with eye doctor regularly.  She reports no new symptoms.    Neuropathy  Symptoms of numbness and tingling in the feet since 2018 with no significant progression since onset.  EMG in the past was reported as normal.  Workup in the past has been unrevealing.  Her exam is stable today.  We did again discuss considering repeat EMG, skin biopsy to assess for small fiber neuropathy however given no significant progression of symptoms over several years and the fact that these results would not  continue to defer today.  Etiology of her neuropathy is likely idiopathic given unrevealing workup.    In the past there was a question of her syncopal episodes related to autonomic neuropathy.  In the past she did not have orthostatic blood pressures in the hospital or in the office.  Patient's episodes of syncope are likely vasovagal in nature given she has GI upset/nausea/vomiting with all of her syncopal episodes.     Plan follow-up in 6 months, however if her symptoms are overall stable she may cancel appointment and follow-up as needed. To contact the office sooner with any concerns or worsening symptoms.      Vasovagal syncope  Episodes of syncope continue to occur with GI upset/nausea/vomiting, likely vasovagal in nature.  She does have Zofran to utilize if she is experiencing GI upset or nausea which is assisted with preventing syncopal episodes recently.  We did discuss if she is experiencing any GI upset or nausea she should avoid driving, use of shower, baths, swimming due to safety concerns of syncope.       Diagnoses and all orders for this visit:    Ocular migraine    Neuropathy    Vasovagal syncope           Subjective:    HPI  Patient is a 73-year-old female with  history of hypothyroidism, asthma, hyperlipidemia, GERD, chronic tinnitus, history of stress incontinence, melanoma and cataracts who presents for follow up for neuropathy and syncope.      Per chart review she presented to the hospital 7/8/23 for syncopal episodes.  For a few days prior she would have syncopal episodes with lightheadedness, nausea, and tinnitus, she would be able to lower herself to the ground during episodes. Event was not suggestive of a seizure. she was not orthostatic during her hospitalization. It was recommended for her to have holter monitor outpatient for syncopal work up. It was thoughout her syncopal was likely vasovagal. She has a hx of vasovagal syncope when she was younger. Was recommended to follow up outpatient for neuropathy work up.    Patient also follows with neurology as an outpatient at [facility], the patient was last seen on 1/31/2023, it was reported she was initially seen for numbness/tingling in hands and right greater than left foot numbness and tingling that started in December 2018, reported she also had a negative EMG in the past   she also has a history of ocular migraines which is described as flashing arrow with a few minute duration.     Last office visit 8/2023 in which she was to f/u with cardiology for sycope work up, no other changes were made.            interval History:  patient did see cardiology-felt syncope in the setting on GI upset/nausea due to abx, did have another event in October and had 2 weeks holter that was reported “essentially normal” other then a few runs of SVT (5-7 beats) that would cause palpitations not fainting.  She has now been using zofran if she has any GI upset or nausea.     She did have an episode of vertigo where she was bending over and started with room spinning sensation, did have a slight headache with this. Last several seconds to a minute. Did vestibular therapy with PT. No other events.      She continues with numbness  "and tingling in the feet, no progression since last visit. No balance. No recent falls. No symptoms in the UE.       numbness and tingling started in 2018, pins and needles sensation in the feet. This has not progressed over this time frame. She denies any muscle weakness. She denies any imbalance. She is non smoker, does not consume alcohol.         She does have a hx of ocular migraines, mainly triggered by stress. She has had these for 15 years. She will see blurry vision and then develop flashing. These continue to occur every few weeks.   No new symptoms or headaches.                  prior work up:  ? Echocardiogram EF 65%, grade 1 diastolic dysfunction   ? 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 She denies any neck pain possibly developmental   EEG normal     [facility]  Lyme Ab neg, SPEP/REGINA nl, B12/MMA nl, A1c 5.4, CRP nl.  in the past per notes from [facility]     recent labs 7/2023 tsh 6.813, t4 normal, a1c 5.2     The patient presented to the hospital at [facility] with syncopal episodes.   CTA of the head and neck showed no significant stenosis or evidence of VBI, echo and EEG are pending.    The following portions of the patient's history were reviewed and updated as appropriate: allergies, current medications, past family history, past medical history, past social history, past surgical history and problem list.          Objective:    Blood pressure 155/67, pulse 68, temperature (!) 97.3 °F (36.3 °C), temperature source Temporal, height 5' 1\" (1.549 m), weight 54.4 kg (120 lb), SpO2 100%.    Physical Exam  Constitutional:       General: She is awake.   HENT:      Right Ear: Hearing normal.      Left Ear: Hearing normal.   Eyes:      General: Lids " are normal.      Pupils: Pupils are equal, round, and reactive to light.   Neurological:      Mental Status: She is alert.      Coordination: Romberg sign negative.      Deep Tendon Reflexes:      Reflex Scores:       Bicep reflexes are 2+ on the right side and 2+ on the left side.       Brachioradialis reflexes are 2+ on the right side and 2+ on the left side.       Patellar reflexes are 2+ on the right side and 2+ on the left side.       Achilles reflexes are 1+ on the right side and 1+ on the left side.  Psychiatric:         Speech: Speech normal.         Neurological Exam  Mental Status  Awake and alert. Oriented to person, place, time and situation. Speech is normal. Language is fluent with no aphasia.    Cranial Nerves  CN III, IV, VI: Normal lids and orbits bilaterally. Pupils equal round and reactive to light bilaterally. 2-3 beats of nystagmus in the right lateral gaze.  CN V:  Right: Facial sensation is normal.  Left: Facial sensation is normal on the left.  CN VII:  Right: There is no facial weakness.  Left: There is no facial weakness.  CN VIII:  Right: Hearing is normal.  Left: Hearing is normal.  CN IX, X: Palate elevates symmetrically  CN XI:  Right: Trapezius strength is normal.  Left: Trapezius strength is normal.  CN XII: Tongue midline without atrophy or fasciculations.    Motor                                               Right                     Left  Elbow flexion                         5                          5  Elbow extension                    5                          5  Wrist flexion                           5                           Wrist extension                      5                          5  Finger flexion                         5                          5  Finger abduction                    5                          5  Hip flexion                              5                          5  Knee flexion                           5                          5  Knee  extension                      5                          5  Ankle inversion                      5                          5  Ankle eversion                       5                          5  Plantarflexion                         5                          5  Dorsiflexion                            5                          5    Sensory  Light touch is normal in upper and lower extremities. Pinprick abnormality: Normal in bilateral UE, diminished to the ankles in b/l LE  . Temperature abnormality: Normal in bilateral UE, diminished to the ankles in b/l LE  . Vibration is normal in upper and lower extremities. Proprioception is normal in upper and lower extremities.     Reflexes                                            Right                      Left  Brachioradialis                    2+                         2+  Biceps                                 2+                         2+  Patellar                                2+                         2+  Achilles                                1+                         1+  Right Plantar: downgoing  Left Plantar: downgoing    Coordination  Right: Finger-to-nose normal. Rapid alternating movement normal.Left: Finger-to-nose normal. Rapid alternating movement normal.    Gait  Casual gait is normal including stance, stride, and arm swing. Normal tandem gait. Romberg is absent. Able to rise from chair without using arms.      I have personally reviewed the ROS performed by the MA.      ROS:    Review of Systems   Constitutional:  Negative for appetite change, fatigue and fever.   HENT: Negative.  Negative for hearing loss, tinnitus, trouble swallowing and voice change.    Eyes: Negative.  Negative for photophobia, pain and visual disturbance.   Respiratory: Negative.  Negative for shortness of breath.    Cardiovascular: Negative.  Negative for palpitations.   Gastrointestinal: Negative.  Negative for nausea and vomiting.   Endocrine: Negative.  Negative for cold  intolerance.   Genitourinary: Negative.  Negative for dysuria, frequency and urgency.   Musculoskeletal:  Negative for back pain, gait problem, myalgias, neck pain and neck stiffness.   Skin: Negative.  Negative for rash.   Allergic/Immunologic: Negative.    Neurological: Negative.  Negative for dizziness, tremors, seizures, syncope, facial asymmetry, speech difficulty, weakness, light-headedness, numbness and headaches.   Hematological: Negative.  Does not bruise/bleed easily.   Psychiatric/Behavioral: Negative.  Negative for confusion, hallucinations and sleep disturbance.    All other systems reviewed and are negative.

## 2024-02-29 NOTE — PATIENT INSTRUCTIONS
Continue to stay active    If you start having any GI upset/nausea treat with zofran and would advise precautions for fainting such as limiting activity, increasing salt intake that day, avoiding driving, avoiding showers/baths    Can cancel appt in 6 months if symptoms stable.

## 2024-02-29 NOTE — ASSESSMENT & PLAN NOTE
Hx of ocular migraines for 15 years. These occur once every few weeks therefore not require preventative medication.  She will continue to follow with eye doctor regularly.  She reports no new symptoms.

## 2024-03-01 NOTE — ASSESSMENT & PLAN NOTE
Episodes of syncope continue to occur with GI upset/nausea/vomiting, likely vasovagal in nature.  She does have Zofran to utilize if she is experiencing GI upset or nausea which is assisted with preventing syncopal episodes recently.  We did discuss if she is experiencing any GI upset or nausea she should avoid driving, use of shower, baths, swimming due to safety concerns of syncope.

## 2024-03-28 DIAGNOSIS — Z00.6 ENCOUNTER FOR EXAMINATION FOR NORMAL COMPARISON OR CONTROL IN CLINICAL RESEARCH PROGRAM: ICD-10-CM

## 2024-04-05 ENCOUNTER — TELEPHONE (OUTPATIENT)
Age: 74
End: 2024-04-05

## 2024-04-05 NOTE — TELEPHONE ENCOUNTER
Patient reports being woken @3:45 am by carbon monoxide detectors.    Called 911 and were evaluated by EMTs. She did experience a headache and nausea but has since been resolving. Asking signs and symptoms of carbon monoxide toxicity.      All questions answered and education provided.

## 2024-04-09 ENCOUNTER — TELEPHONE (OUTPATIENT)
Age: 74
End: 2024-04-09

## 2024-04-09 NOTE — TELEPHONE ENCOUNTER
Patient with concerns of CO exposure last week but has noticed an increased use of inhaler. Informed that this is outside of the threshold for CO exposure symptoms but can make an appointment to address concerns.

## 2024-04-10 ENCOUNTER — APPOINTMENT (OUTPATIENT)
Dept: LAB | Facility: HOSPITAL | Age: 74
End: 2024-04-10

## 2024-04-10 DIAGNOSIS — Z00.6 ENCOUNTER FOR EXAMINATION FOR NORMAL COMPARISON OR CONTROL IN CLINICAL RESEARCH PROGRAM: ICD-10-CM

## 2024-04-10 PROCEDURE — 36415 COLL VENOUS BLD VENIPUNCTURE: CPT

## 2024-04-12 ENCOUNTER — OFFICE VISIT (OUTPATIENT)
Age: 74
End: 2024-04-12

## 2024-04-12 ENCOUNTER — NURSE TRIAGE (OUTPATIENT)
Age: 74
End: 2024-04-12

## 2024-04-12 VITALS
OXYGEN SATURATION: 95 % | BODY MASS INDEX: 22.88 KG/M2 | WEIGHT: 121.2 LBS | TEMPERATURE: 97.7 F | HEIGHT: 61 IN | SYSTOLIC BLOOD PRESSURE: 118 MMHG | DIASTOLIC BLOOD PRESSURE: 70 MMHG | HEART RATE: 79 BPM

## 2024-04-12 DIAGNOSIS — J45.40 MODERATE PERSISTENT ASTHMA WITHOUT COMPLICATION: ICD-10-CM

## 2024-04-12 DIAGNOSIS — Z77.29 CARBON MONOXIDE EXPOSURE: Primary | ICD-10-CM

## 2024-04-12 RX ORDER — PREDNISONE 20 MG/1
40 TABLET ORAL DAILY
Qty: 10 TABLET | Refills: 0 | Status: SHIPPED | OUTPATIENT
Start: 2024-04-12

## 2024-04-12 NOTE — ASSESSMENT & PLAN NOTE
Increased shortness of breath over the past several days, unlikely at this point to be from prior CO exposure. Chest is clear to exam. We did review recent PFT which was normal.  Breo stopped in early March when she started Trelegy - initially felt Trelegy to be helfpul  Albuterol HFA PRN up to every 4 hours  Trelegy to continue once per day  5-day prednisone burst recommended; if no benefit would have to consider other etiology than asthma (differential including cardiac causes, anxiety, etc)
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
## 2024-04-12 NOTE — TELEPHONE ENCOUNTER
Incoming call: Patient canceled appt earlier this week and just wants to schedule a new appointment for today for breathing symptoms.

## 2024-04-12 NOTE — ASSESSMENT & PLAN NOTE
From a clinical standpoint she is remote from this exposure and no clear agent has been identified as to why her sensors were alarming.  Several days have passed, making carboxyhemoglobin measurement unreliable  Ensure batteries are up to date in sensors, and that sensors are current.

## 2024-04-25 LAB
APOB+LDLR+PCSK9 GENE MUT ANL BLD/T: NOT DETECTED
BRCA1+BRCA2 DEL+DUP + FULL MUT ANL BLD/T: NOT DETECTED
MLH1+MSH2+MSH6+PMS2 GN DEL+DUP+FUL M: NOT DETECTED

## 2024-05-17 ENCOUNTER — TELEPHONE (OUTPATIENT)
Facility: CLINIC | Age: 74
End: 2024-05-17

## 2024-05-17 NOTE — TELEPHONE ENCOUNTER
Patient had requested call back regarding some questions.  Left message requesting return call or time to best reach her.  Details to follow.

## 2024-05-23 ENCOUNTER — OFFICE VISIT (OUTPATIENT)
Age: 74
End: 2024-05-23
Payer: MEDICARE

## 2024-05-23 VITALS
SYSTOLIC BLOOD PRESSURE: 138 MMHG | HEART RATE: 73 BPM | WEIGHT: 122.2 LBS | DIASTOLIC BLOOD PRESSURE: 80 MMHG | TEMPERATURE: 98.7 F | OXYGEN SATURATION: 97 % | BODY MASS INDEX: 23.09 KG/M2

## 2024-05-23 DIAGNOSIS — J45.50 SEVERE PERSISTENT ASTHMA WITHOUT COMPLICATION: Primary | ICD-10-CM

## 2024-05-23 DIAGNOSIS — J45.40 MODERATE PERSISTENT ASTHMA WITHOUT COMPLICATION: ICD-10-CM

## 2024-05-23 PROCEDURE — G2211 COMPLEX E/M VISIT ADD ON: HCPCS | Performed by: INTERNAL MEDICINE

## 2024-05-23 PROCEDURE — 99214 OFFICE O/P EST MOD 30 MIN: CPT | Performed by: INTERNAL MEDICINE

## 2024-05-23 RX ORDER — NYSTATIN 100000 U/G
15 CREAM TOPICAL 2 TIMES DAILY
COMMUNITY

## 2024-05-23 RX ORDER — LEVALBUTEROL INHALATION SOLUTION 0.63 MG/3ML
0.63 SOLUTION RESPIRATORY (INHALATION) 3 TIMES DAILY
Qty: 90 ML | Refills: 5 | Status: SHIPPED | OUTPATIENT
Start: 2024-05-23

## 2024-05-23 NOTE — ASSESSMENT & PLAN NOTE
Lupe and I discussed an action plan for asthma.  For the most part she is stable on the Trelegy.  I instructed her if she gets short of breath use her albuterol but if she needs the albuterol pump more than 3 times in 1 day to start her nebulizer.  She will do the nebulizer 3 times a day for 24 hours before she considers prednisone.  Her lung sounds and vital signs are normal on today's exam.

## 2024-05-23 NOTE — PATIENT INSTRUCTIONS
Use Trelegy daily  If breathing is worse use albuterol pump  If using pump more than three times in 1 day start nebulizer  Use nebulizer three times a day if no improvement in 24 hours start prednisone

## 2024-05-23 NOTE — PROGRESS NOTES
Ambulatory Visit  Name: Lupe Azul      : 1950      MRN: [id number]  Encounter Provider: Lynnette Ji DO  Encounter Date: 2024   Encounter department: [facility] [place]    Assessment & Plan   1. Severe persistent asthma without complication  -     levalbuterol (Xopenex) 0.63 mg/3 mL nebulizer solution; Take 3 mL (0.63 mg total) by nebulization 3 (three) times a day  2. Moderate persistent asthma without complication  Assessment & Plan:  Lupe and I discussed an action plan for asthma.  For the most part she is stable on the Trelegy.  I instructed her if she gets short of breath use her albuterol but if she needs the albuterol pump more than 3 times in 1 day to start her nebulizer.  She will do the nebulizer 3 times a day for 24 hours before she considers prednisone.  Her lung sounds and vital signs are normal on today's exam.      History of Present Illness     Lupe Azul is a 73 y.o. female who presents for follow-up of her asthma.    Review of Systems   Constitutional: Negative.  Negative for appetite change, fever and unexpected weight change.   HENT:  Positive for postnasal drip. Negative for ear pain, rhinorrhea, sneezing, sore throat and trouble swallowing.    Eyes: Negative.    Respiratory:  Positive for shortness of breath. Negative for cough and wheezing.    Cardiovascular:  Positive for chest pain. Negative for leg swelling.   Gastrointestinal: Negative.    Endocrine: Negative.    Genitourinary: Negative.    Musculoskeletal: Negative.  Negative for myalgias.   Allergic/Immunologic: Negative.    Neurological: Negative.  Negative for headaches.   Hematological: Negative.      Medical History Reviewed by provider this encounter:       Current Outpatient Medications on File Prior to Visit   Medication Sig Dispense Refill    albuterol (PROVENTIL HFA,VENTOLIN HFA) 90 mcg/act inhaler Inhale 2 puffs every 4 (four) hours as needed for shortness of breath 18 g 3     Cholecalciferol 50 MCG (2000 UT) CAPS Take 1 tablet by mouth daily OCT -  MARCH 4000 IU  APRIL - SEPT 3000 IU      Diclofenac Sodium (VOLTAREN) 1 % Apply 2 g topically 4 (four) times a day      fluticasone (VERAMYST) 27.5 MCG/SPRAY nasal spray 1 spray into each nostril daily (Patient taking differently: 2 sprays into each nostril daily)      fluticasone-umeclidinium-vilanterol (Trelegy Ellipta) 100-62.5-25 mcg/actuation inhaler Inhale 1 puff daily Rinse mouth after use. 180 blister 5    levothyroxine 50 mcg tablet Take 50 mcg by mouth daily in the early morning      metoprolol tartrate (LOPRESSOR) 25 mg tablet Take 1 tablet (25 mg total) by mouth every 12 (twelve) hours 180 tablet 3    metroNIDAZOLE (METROGEL) 1 % gel Apply 1 application topically daily      Multiple Vitamins-Minerals (PRESERVISION AREDS 2 PO) Take 1 capsule by mouth 2 (two) times a day with meals      nitroglycerin (NITROSTAT) 0.4 mg SL tablet Place 1 tablet (0.4 mg total) under the tongue every 5 (five) minutes as needed for chest pain 30 tablet 0    nystatin (MYCOSTATIN) cream Apply 15 g topically 2 (two) times a day      ondansetron (ZOFRAN) 4 mg tablet Take 4 mg by mouth every 8 (eight) hours as needed for nausea      Peak Flow Meter NANDA 1 Act by Does not apply route 2 (two) times a day      polyethylene glycol-propylene glycol (SYSTANE) 0.4-0.3 % Apply 1 drop to eye Three times a day      raloxifene (EVISTA) 60 mg tablet Take 60 mg by mouth daily      rosuvastatin (CRESTOR) 20 MG tablet Take 1 tablet (20 mg total) by mouth daily with dinner 90 tablet 3    sucralfate (CARAFATE) 1 g tablet Take 1 g by mouth daily with lunch One tablet with lunch and dinner      predniSONE 20 mg tablet Take 2 tablets (40 mg total) by mouth daily 10 tablet 0     No current facility-administered medications on file prior to visit.      Social History     Tobacco Use    Smoking status: Never     Passive exposure: Past    Smokeless tobacco: Never   Vaping Use     Vaping status: Never Used   Substance and Sexual Activity    Alcohol use: Not Currently    Drug use: Never    Sexual activity: Not Currently     Birth control/protection: Abstinence, Post-menopausal     Objective     /80 (BP Location: Left arm, Patient Position: Sitting, Cuff Size: Standard)   Pulse 73   Temp 98.7 °F (37.1 °C) (Tympanic)   Wt 55.4 kg (122 lb 3.2 oz)   SpO2 97%   BMI 23.09 kg/m²     Physical Exam  Constitutional:       Appearance: She is well-developed.   HENT:      Head: Normocephalic and atraumatic.   Eyes:      Pupils: Pupils are equal, round, and reactive to light.   Cardiovascular:      Rate and Rhythm: Normal rate and regular rhythm.      Heart sounds: No murmur heard.  Pulmonary:      Effort: Pulmonary effort is normal. No respiratory distress.      Breath sounds: Normal breath sounds. No wheezing or rales.   Abdominal:      Palpations: Abdomen is soft.   Musculoskeletal:      Cervical back: Normal range of motion and neck supple.   Skin:     General: Skin is warm and dry.   Neurological:      Mental Status: She is alert and oriented to person, place, and time.       Administrative Statements           Answers submitted by the patient for this visit:  Pulmonology Questionnaire (Submitted on 5/19/2024)  Chief Complaint: Primary symptoms  Do you have chest tightness?: Yes  Do you have a hoarse voice?: Yes  Chronicity: chronic  When did you first notice your symptoms?: more than 1 year ago  How often do your symptoms occur?: every several days  Since you first noticed this problem, how has it changed?: waxing and waning  Do you have shortness of breath that occurs with effort or exertion?: Yes  Do you have ear congestion?: No  Do you have heartburn?: No  Do you have fatigue?: Yes  Do you have nasal congestion?: No  Do you have shortness of breath when lying flat?: No  Do you have shortness of breath when you wake up?: No  Do you have sweats?: No  Have you experienced weight loss?:  No  Which of the following makes your symptoms worse?: emotional stress, exposure to fumes, exposure to smoke, strenuous activity  Which of the following makes your symptoms better?: oral steroids, rest

## 2024-08-29 ENCOUNTER — OFFICE VISIT (OUTPATIENT)
Dept: NEUROLOGY | Facility: CLINIC | Age: 74
End: 2024-08-29
Payer: MEDICARE

## 2024-08-29 VITALS
OXYGEN SATURATION: 99 % | SYSTOLIC BLOOD PRESSURE: 152 MMHG | BODY MASS INDEX: 22.69 KG/M2 | DIASTOLIC BLOOD PRESSURE: 80 MMHG | WEIGHT: 120.2 LBS | TEMPERATURE: 97.4 F | HEART RATE: 77 BPM | HEIGHT: 61 IN

## 2024-08-29 DIAGNOSIS — G43.109 OCULAR MIGRAINE: Primary | ICD-10-CM

## 2024-08-29 DIAGNOSIS — G62.9 NEUROPATHY: ICD-10-CM

## 2024-08-29 DIAGNOSIS — R55 VASOVAGAL SYNCOPE: ICD-10-CM

## 2024-08-29 PROCEDURE — 99214 OFFICE O/P EST MOD 30 MIN: CPT | Performed by: NURSE PRACTITIONER

## 2024-08-29 RX ORDER — BACLOFEN 5 MG/1
1 TABLET ORAL
COMMUNITY
Start: 2024-07-25

## 2024-08-29 RX ORDER — CONJUGATED ESTROGENS 0.62 MG/G
CREAM VAGINAL
COMMUNITY
Start: 2024-08-23

## 2024-08-29 NOTE — ASSESSMENT & PLAN NOTE
Hx of ocular migraines for over 15 years.  These occur 1-2 times a month, therefore does not require printed preventative medication.  She does notice during times of stress these can increase but is short-lived.  She does follow-up with eye doctor regularly.

## 2024-08-29 NOTE — PROGRESS NOTES
Patient is a 73-year-old female with history of hypothyroidism, asthma, hyperlipidemia, GERD, chronic tinnitus, history of stress incontinence, melanoma and cataracts who presents for follow up for neuropathy and syncope.      Per chart review she presented to the hospital 7/8/23 for syncopal episodes.  For a few days prior she would have syncopal episodes with lightheadedness, nausea, and tinnitus, she would be able to lower herself to the ground during episodes. Event was not suggestive of a seizure. she was not orthostatic. It It was thoughout her syncopal was likely vasovagal. She has a hx of vasovagal syncope when she was younger. she did have 2 week holter-essential normal.   Patient also follows with neurology as an outpatient at [facility], the patient was last seen on 1/31/2023, it was reported she was initially seen for numbness/tingling in hands and right greater than left foot numbness and tingling that started in December 2018, reported she also had a negative EMG in the past   she also has a history of ocular migraines which is described as flashing arrow with a few minute duration.      Last office visit 2/2024 in which no changes were made.         interval History:                prior work up:  ? Echocardiogram EF 65%, grade 1 diastolic dysfunction   ? 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 She denies any neck pain possibly developmental   EEG normal     EMG performed at [facility] 1/2019 normal  Lyme Ab neg, SPEP/REGINA nl, B12/MMA nl, A1c 5.4, CRP nl.  in the past per notes from [facility]     recent labs 7/2023 tsh 6.813, t4 normal, a1c 5.2     The patient presented to the hospital at [facility] with syncopal episodes.   CTA of the head and neck showed no significant  stenosis or evidence of VBI, echo and EEG are pending.

## 2024-08-29 NOTE — PROGRESS NOTES
Patient ID: Lupe Azul is a 74 y.o. female.    Assessment/Plan:    Ocular migraine  Hx of ocular migraines for over 15 years.  These occur 1-2 times a month, therefore does not require printed preventative medication.  She does notice during times of stress these can increase but is short-lived.  She does follow-up with eye doctor regularly.      Neuropathy  Symptoms of numbness and tingling in the feet since 2018 with no significant progression since onset.  EMG in the past was reported as normal.  Workup in the past has been unrevealing.  Likely idiopathic neuropathy.  Continue to defer skin biopsy to assess for small fiber neuropathy as she has had no significant progression of her symptoms and these likely would not .  She denies any significant neuropathic pain.      In the past there was a question of her syncopal episodes related to autonomic neuropathy however she has not had orthostatic blood pressures.Her episodes of syncope are likely vasovagal in nature.    Given her stability she will follow-up on an as-needed basis.       Vasovagal syncope  Episodes of syncope continue to occur with GI upset/nausea/vomiting, likely vasovagal in nature.  Cardiac workup was unrevealing, she has not had positive orthostatic blood pressures.  She did recently have another syncopal episode however this was in the setting of COVID with GI symptoms.  She should contact the office if she has any syncopal episodes that do not occur in the setting.     We did discuss if she is experiencing any GI upset or nausea she should avoid driving, use of shower, baths, swimming due to safety concerns of syncope. She does utilize a helmet for safety during these times.       Diagnoses and all orders for this visit:    Ocular migraine    Neuropathy    Vasovagal syncope    Other orders  -     Baclofen 5 MG TABS; Take 1 tablet by mouth daily at bedtime (Patient not taking: Reported on 8/29/2024)  -     Premarin vaginal  cream; APPLY 1 G INTO THE VAGINA ONCE A WEEK.           Subjective:    HPI  Patient is a 73-year-old female with history of hypothyroidism, asthma, hyperlipidemia, GERD, chronic tinnitus, history of stress incontinence, melanoma and cataracts who presents for follow up for neuropathy and syncope.      Per chart review she presented to the hospital 7/8/23 for syncopal episodes.  For a few days prior she would have syncopal episodes with lightheadedness, nausea, and tinnitus, she would be able to lower herself to the ground during episodes. Event was not suggestive of a seizure. she was not orthostatic. It It was thoughout her syncopal was likely vasovagal. She has a hx of vasovagal syncope when she was younger. she did have 2 week holter-essential normal.   Patient also follows with neurology as an outpatient at [facility], the patient was last seen on 1/31/2023, it was reported she was initially seen for numbness/tingling in hands and right greater than left foot numbness and tingling that started in December 2018, reported she also had a negative EMG in the past   she also has a history of ocular migraines which is described as flashing arrow with a few minute duration.      Last office visit 2/2024 in which no changes were made.         interval History:  No syncopal episodes up until she had COVID in July, had GI symptoms and had some nausea and had 1 fainting episode.   She continues to be extra cautious if she is sick or nauseous, wears helmet, padded clothes.       Numbness and tingling in the feet has been stable. No progression since last visit. No falls other then fainting episode. No muscle weakness.     She did have an increase in ocular migraines in the month of July, attributed to stress. Last month did she not have any, previous months would be 1-2 month per month at most. She finds stress is a large trigger. No new symptoms with these.                 prior work up:  ? Echocardiogram EF 65%, grade  "1 diastolic dysfunction   ? 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 She denies any neck pain possibly developmental   EEG normal     [facility] at [facility] 1/2019 normal  Lyme Ab neg, SPEP/REGINA nl, B12/MMA nl, A1c 5.4, CRP nl.  in the past per notes from [facility]     recent labs 7/2023 tsh 6.813, t4 normal, a1c 5.2     The patient presented to the hospital at [facility] with syncopal episodes.   CTA of the head and neck showed no significant stenosis or evidence of VBI, echo and EEG are pending.       The following portions of the patient's history were reviewed and updated as appropriate: allergies, current medications, past family history, past medical history, past social history, past surgical history and problem list.       Objective:    Blood pressure 152/80, pulse 77, temperature (!) 97.4 °F (36.3 °C), temperature source Temporal, height 5' 1\" (1.549 m), weight 54.5 kg (120 lb 3.2 oz), SpO2 99%.    Physical Exam  Constitutional:       General: She is awake.   HENT:      Right Ear: Hearing normal.      Left Ear: Hearing normal.   Eyes:      General: Lids are normal.      Extraocular Movements: Extraocular movements intact.      Pupils: Pupils are equal, round, and reactive to light.   Neurological:      Mental Status: She is alert.      Deep Tendon Reflexes:      Reflex Scores:       Bicep reflexes are 2+ on the right side and 2+ on the left side.       Brachioradialis reflexes are 2+ on the right side and 2+ on the left side.       Patellar reflexes are 2+ on the right side and 2+ on the left side.       Achilles reflexes are 1+ on the right side and 1+ on the left side.  Psychiatric:         Speech: Speech normal.         Neurological Exam  Mental Status  Awake and alert. Oriented " to person, place, time and situation. Speech is normal. Language is fluent with no aphasia.    Cranial Nerves  CN III, IV, VI: Extraocular movements intact bilaterally. Normal lids and orbits bilaterally. Pupils equal round and reactive to light bilaterally.  CN V:  Right: Facial sensation is normal.  Left: Facial sensation is normal on the left.  CN VII:  Right: There is no facial weakness.  Left: There is no facial weakness.  CN VIII:  Right: Hearing is normal.  Left: Hearing is normal.  CN IX, X: Palate elevates symmetrically  CN XI:  Right: Trapezius strength is normal.  Left: Trapezius strength is normal.  CN XII: Tongue midline without atrophy or fasciculations.    Motor                                               Right                     Left  Elbow flexion                         5                          5  Elbow extension                    5                          5  Wrist flexion                           5                          5  Wrist extension                      5                          5  Finger flexion                         5                          5  Finger abduction                    5                          5  Hip flexion                              5                          5  Knee flexion                           5                          5  Knee extension                      5                          5  Ankle inversion                      5                          5  Ankle eversion                       5                          5  Plantarflexion                         5                          5  Dorsiflexion                            5                          5    Sensory  Light touch is normal in upper and lower extremities. Pinprick abnormality: Normal in bilateral UE, diminished to the ankles in b/l LE  . Temperature abnormality: Normal in bilateral UE, diminished to the ankles in b/l LE  . Vibration is normal in upper and lower extremities.  Proprioception is normal in upper and lower extremities.     Reflexes                                            Right                      Left  Brachioradialis                    2+                         2+  Biceps                                 2+                         2+  Patellar                                2+                         2+  Achilles                                1+                         1+  Right Plantar: downgoing  Left Plantar: downgoing    Coordination  Right: Finger-to-nose normal. Rapid alternating movement normal.Left: Finger-to-nose normal. Rapid alternating movement normal.    Gait  Casual gait is normal including stance, stride, and arm swing. Able to rise from chair without using arms.    I have personally reviewed the ROS performed by the MA.      ROS:    Review of Systems   Constitutional:  Negative for appetite change, fatigue and fever.   HENT: Negative.  Negative for hearing loss, tinnitus, trouble swallowing and voice change.    Eyes: Negative.  Negative for photophobia, pain and visual disturbance.   Respiratory: Negative.  Negative for shortness of breath.    Cardiovascular: Negative.  Negative for palpitations.   Gastrointestinal: Negative.  Negative for nausea and vomiting.   Endocrine: Negative.  Negative for cold intolerance.   Genitourinary: Negative.  Negative for dysuria, frequency and urgency.   Musculoskeletal:  Negative for back pain, gait problem, myalgias, neck pain and neck stiffness.   Skin: Negative.  Negative for rash.   Allergic/Immunologic: Negative.    Neurological:  Positive for numbness (b/l feet) and headaches (In July- 5 a month/ none in [place]). Negative for dizziness, tremors, seizures, syncope, facial asymmetry, speech difficulty, weakness and light-headedness.        Fainted twice during covid     Hematological: Negative.  Does not bruise/bleed easily.   Psychiatric/Behavioral: Negative.  Negative for confusion, hallucinations and sleep  disturbance.    All other systems reviewed and are negative.

## 2024-08-29 NOTE — ASSESSMENT & PLAN NOTE
Symptoms of numbness and tingling in the feet since 2018 with no significant progression since onset.  EMG in the past was reported as normal.  Workup in the past has been unrevealing.  Likely idiopathic neuropathy.  Continue to defer skin biopsy to assess for small fiber neuropathy as she has had no significant progression of her symptoms and these likely would not .  She denies any significant neuropathic pain.      In the past there was a question of her syncopal episodes related to autonomic neuropathy however she has not had orthostatic blood pressures.Her episodes of syncope are likely vasovagal in nature.    Given her stability she will follow-up on an as-needed basis.

## 2024-08-29 NOTE — PATIENT INSTRUCTIONS
Continue precautions if ill or having GI symptoms, hydrate well during these times    Continue to stay active

## 2024-08-29 NOTE — ASSESSMENT & PLAN NOTE
Episodes of syncope continue to occur with GI upset/nausea/vomiting, likely vasovagal in nature.  Cardiac workup was unrevealing, she has not had positive orthostatic blood pressures.  She did recently have another syncopal episode however this was in the setting of COVID with GI symptoms.  She should contact the office if she has any syncopal episodes that do not occur in the setting.     We did discuss if she is experiencing any GI upset or nausea she should avoid driving, use of shower, baths, swimming due to safety concerns of syncope. She does utilize a helmet for safety during these times.

## 2024-09-03 ENCOUNTER — NURSE TRIAGE (OUTPATIENT)
Age: 74
End: 2024-09-03

## 2024-09-03 DIAGNOSIS — J45.50 SEVERE PERSISTENT ASTHMA WITHOUT COMPLICATION: Primary | ICD-10-CM

## 2024-09-03 RX ORDER — PREDNISONE 10 MG/1
TABLET ORAL
Qty: 30 TABLET | Refills: 0 | Status: SHIPPED | OUTPATIENT
Start: 2024-09-03

## 2024-09-03 NOTE — TELEPHONE ENCOUNTER
BRODY Perez    Can patient take Zofran 4mg more than once a with her Prednisone? She is still experiencing N/V with her Albuterol and unable to keep any food down.    Please advise.

## 2024-09-03 NOTE — TELEPHONE ENCOUNTER
"Patient call:  Pt stated provider: Dr. Ji    Actionable item: Medication management    What is the reason for the call/chief complaint?    Reports asthma flare as of a few days ago + syncopal events as a result of vasovagal caused by GI upset/nausea.   Took Zofran this morning.  Currently in [place] so cannot nebulize. Has been using Albuterol inhaler 2-3 times a day with minimal efficacy. Requesting steroids as this usually helps.    If seen as appropriate by provider, please send any scripts to:  [facility], [place]  [id number], [place] [address]  Phone: [phone]    Dispo: Appointment scheduled for tomorrow. Patient requesting remote script of steroids. Advised to lay down on side in case of recurrent syncopal event. Currently wearing helmet and puffer jacket. Encouraged bland foods and to use inhalers as prescribed. Routing to provider.  Informed to call [facility] with worsening symptoms.  Agrees with plan.   All questions answered.     Reason for Disposition   MODERATE longstanding difficulty breathing (e.g., speaks in phrases, SOB even at rest, pulse 100-120) and SAME as normal    Answer Assessment - Initial Assessment Questions  1. RESPIRATORY STATUS: \"Describe your breathing?\" (e.g., wheezing, shortness of breath, unable to speak, severe coughing)       SOb  2. ONSET: \"When did this breathing problem begin?\"       Few days ago  3. PATTERN \"Does the difficult breathing come and go, or has it been constant since it started?\"       =  4. SEVERITY: \"How bad is your breathing?\" (e.g., mild, moderate, severe)     - MILD: No SOB at rest, mild SOB with walking, speaks normally in sentences, can lay down, no retractions, pulse < 100.     - MODERATE: SOB at rest, SOB with minimal exertion and prefers to sit, cannot lie down flat, speaks in phrases, mild retractions, audible wheezing, pulse 100-120.     - SEVERE: Very SOB at rest, speaks in single words, struggling to breathe, sitting hunched forward, " "retractions, pulse > 120       \"Ok right now\"  5. RECURRENT SYMPTOM: \"Have you had difficulty breathing before?\" If Yes, ask: \"When was the last time?\" and \"What happened that time?\"       Yes- steroid  6. CARDIAC HISTORY: \"Do you have any history of heart disease?\" (e.g., heart attack, angina, bypass surgery, angioplasty)       Please see chart  7. LUNG HISTORY: \"Do you have any history of lung disease?\"  (e.g., pulmonary embolus, asthma, emphysema)      Please see chart  8. CAUSE: \"What do you think is causing the breathing problem?\"       Asthma flare  9. OTHER SYMPTOMS: \"Do you have any other symptoms? (e.g., dizziness, runny nose, cough, chest pain, fever)      Vasovagal - nausea- syncopal event    Protocols used: Breathing Difficulty-ADULT-[place]    "

## 2024-09-03 NOTE — TELEPHONE ENCOUNTER
Notified patient as per BRODY Perez 09/03/24 2:16pm message. Patient had no further questions and/or concerns at this time.

## 2024-09-03 NOTE — TELEPHONE ENCOUNTER
Called spoke with patient she was on vacation in the [facility] and did not have her nebulizer called in prednisone taper I did state to try to eat something with prednisone given that could cause GI upset as well however she likely has a viral GI bug-I stated she can use her MDI up to every 4 hours without her nebulizer being there

## 2024-09-04 ENCOUNTER — TELEPHONE (OUTPATIENT)
Age: 74
End: 2024-09-04

## 2024-09-04 NOTE — TELEPHONE ENCOUNTER
L/M asking Pt to call back and let us know if she feels like she needs to be seen today or if she would like to reschedule for a later day since the provider already sent the medication to the pharmacy.

## 2024-09-12 DIAGNOSIS — I20.89 CARDIAC SYNDROME X: ICD-10-CM

## 2024-09-12 RX ORDER — METOPROLOL TARTRATE 25 MG/1
25 TABLET, FILM COATED ORAL EVERY 12 HOURS SCHEDULED
Qty: 180 TABLET | Refills: 0 | Status: SHIPPED | OUTPATIENT
Start: 2024-09-12

## 2024-10-09 ENCOUNTER — OFFICE VISIT (OUTPATIENT)
Age: 74
End: 2024-10-09
Payer: MEDICARE

## 2024-10-09 VITALS
WEIGHT: 121.6 LBS | HEIGHT: 61 IN | SYSTOLIC BLOOD PRESSURE: 140 MMHG | HEART RATE: 74 BPM | OXYGEN SATURATION: 98 % | TEMPERATURE: 96.5 F | BODY MASS INDEX: 22.96 KG/M2 | DIASTOLIC BLOOD PRESSURE: 80 MMHG

## 2024-10-09 DIAGNOSIS — J45.50 SEVERE PERSISTENT ASTHMA WITHOUT COMPLICATION: ICD-10-CM

## 2024-10-09 DIAGNOSIS — J45.40 MODERATE PERSISTENT ASTHMA WITHOUT COMPLICATION: Primary | ICD-10-CM

## 2024-10-09 DIAGNOSIS — Z23 ENCOUNTER FOR IMMUNIZATION: ICD-10-CM

## 2024-10-09 DIAGNOSIS — J45.30 MILD PERSISTENT ASTHMA, UNSPECIFIED WHETHER COMPLICATED: ICD-10-CM

## 2024-10-09 PROCEDURE — 90662 IIV NO PRSV INCREASED AG IM: CPT

## 2024-10-09 PROCEDURE — G0008 ADMIN INFLUENZA VIRUS VAC: HCPCS

## 2024-10-09 PROCEDURE — 99213 OFFICE O/P EST LOW 20 MIN: CPT | Performed by: INTERNAL MEDICINE

## 2024-10-09 RX ORDER — FLUTICASONE FUROATE, UMECLIDINIUM BROMIDE AND VILANTEROL TRIFENATATE 100; 62.5; 25 UG/1; UG/1; UG/1
1 POWDER RESPIRATORY (INHALATION) DAILY
Qty: 180 BLISTER | Refills: 5 | Status: SHIPPED | OUTPATIENT
Start: 2024-10-09 | End: 2026-04-02

## 2024-10-09 RX ORDER — ALBUTEROL SULFATE 90 UG/1
2 INHALANT RESPIRATORY (INHALATION) EVERY 4 HOURS PRN
Qty: 18 G | Refills: 3 | Status: SHIPPED | OUTPATIENT
Start: 2024-10-09

## 2024-10-09 NOTE — ASSESSMENT & PLAN NOTE
Saida and I talked at length about irritant avoidance.  We talked about strategies to mitigate her exposure to irritants particularly dehumidifier's.  At this point she will maintain on Trelegy 1 puff daily and use her albuterol as needed.  I will give her the flu shot on today's visit and we will follow-up with her in 4 months.

## 2024-10-09 NOTE — PROGRESS NOTES
Ambulatory Visit  Name: Lupe Azul      : 1950      MRN: [id number]  Encounter Provider: Lynnette Ji DO  Encounter Date: 10/9/2024   Encounter department: [facility] PULMONARY [facility]    Assessment & Plan  Severe persistent asthma without complication    Orders:    fluticasone-umeclidinium-vilanterol (Trelegy Ellipta) 100-62.5-25 mcg/actuation inhaler; Inhale 1 puff daily Rinse mouth after use.    influenza vaccine, high-dose, PF 0.5 mL (Fluzone High Dose)    Mild persistent asthma, unspecified whether complicated    Orders:    albuterol (PROVENTIL HFA,VENTOLIN HFA) 90 mcg/act inhaler; Inhale 2 puffs every 4 (four) hours as needed for shortness of breath    Encounter for immunization    Orders:    influenza vaccine, high-dose, PF 0.5 mL (Fluzone High Dose)    Moderate persistent asthma without complication  Saida and I talked at length about irritant avoidance.  We talked about strategies to mitigate her exposure to irritants particularly dehumidifier's.  At this point she will maintain on Trelegy 1 puff daily and use her albuterol as needed.  I will give her the flu shot on today's visit and we will follow-up with her in 4 months.           History of Present Illness     Lupe Azul is a 74 y.o. female who presents for follow-up of her asthma.  She is on Trelegy once a day denies any change in her symptoms.    History obtained from : patient  Review of Systems   Constitutional: Negative.  Negative for appetite change, fever and unexpected weight change.   HENT:  Positive for postnasal drip. Negative for ear pain, rhinorrhea, sneezing, sore throat and trouble swallowing.    Eyes: Negative.    Respiratory:  Positive for shortness of breath. Negative for cough and wheezing.    Cardiovascular:  Positive for chest pain. Negative for leg swelling.   Gastrointestinal: Negative.    Endocrine: Negative.    Genitourinary: Negative.    Musculoskeletal: Negative.  Negative for myalgias.  "  Allergic/Immunologic: Negative.    Neurological: Negative.  Negative for headaches.   Hematological: Negative.      Medical History Reviewed by provider this encounter:           Objective     /80   Pulse 74   Temp (!) 96.5 °F (35.8 °C)   Ht 5' 1\" (1.549 m)   Wt 55.2 kg (121 lb 9.6 oz)   SpO2 98%   BMI 22.98 kg/m²     Physical Exam  Constitutional:       Appearance: She is well-developed.   HENT:      Head: Normocephalic and atraumatic.   Eyes:      Pupils: Pupils are equal, round, and reactive to light.   Cardiovascular:      Rate and Rhythm: Normal rate and regular rhythm.      Heart sounds: No murmur heard.  Pulmonary:      Effort: Pulmonary effort is normal. No respiratory distress.      Breath sounds: Normal breath sounds. No wheezing or rales.   Abdominal:      Palpations: Abdomen is soft.   Musculoskeletal:      Cervical back: Normal range of motion and neck supple.   Skin:     General: Skin is warm and dry.   Neurological:      Mental Status: She is alert and oriented to person, place, and time.       Administrative Statements   I have spent a total time of 25 minutes in caring for this patient on the day of the visit/encounter including Diagnostic results, Prognosis, Risks and benefits of tx options, Instructions for management, Documenting in the medical record, and Obtaining or reviewing history  .  Answers submitted by the patient for this visit:  Pulmonology Questionnaire (Submitted on 10/5/2024)  Chief Complaint: Primary symptoms  Do you have chest tightness?: Yes  Do you experience frequent throat clearing?: Yes  Do you have a hoarse voice?: Yes  Chronicity: recurrent  When did you first notice your symptoms?: more than 1 year ago  How often do your symptoms occur?: daily  Since you first noticed this problem, how has it changed?: waxing and waning  Do you have shortness of breath that occurs with effort or exertion?: Yes  Do you have ear congestion?: No  Do you have heartburn?: No  Do " you have fatigue?: Yes  Do you have nasal congestion?: No  Do you have shortness of breath when lying flat?: No  Do you have shortness of breath when you wake up?: No  Do you have sweats?: No  Have you experienced weight loss?: No

## 2024-11-07 ENCOUNTER — TELEPHONE (OUTPATIENT)
Age: 74
End: 2024-11-07

## 2024-11-18 ENCOUNTER — TELEPHONE (OUTPATIENT)
Age: 74
End: 2024-11-18

## 2024-11-18 DIAGNOSIS — J45.50 SEVERE PERSISTENT ASTHMA WITHOUT COMPLICATION: Primary | ICD-10-CM

## 2024-11-18 RX ORDER — PREDNISONE 20 MG/1
40 TABLET ORAL DAILY
Qty: 10 TABLET | Refills: 0 | Status: SHIPPED | OUTPATIENT
Start: 2024-11-18 | End: 2024-11-23

## 2024-11-18 NOTE — TELEPHONE ENCOUNTER
Pt stated Pulm Provider: Dr. Ji    Actionable item:Possible change in inhaler or something sent to the pharmacy on file    What is the reason for the call/chief complaint?    Pt has been experiencing shortness of breath, typically the morning is worse for her very short of breath. Pt has been using Trelegy inhaler and albuterol inhaler and finds little bit of relief but not really improving. She is not exerting herself much besides talking on the phone but she still gets winded and experiences the shortness of breath.     No cough, no runny nose, no fever, no wheeze    In the past she has been on Prednisone and that has helped, she knows that she isn't supposed to be on that long term but wanting to know what other options are for her to have      Priority:

## 2024-11-30 DIAGNOSIS — I20.89 CARDIAC SYNDROME X (HCC): ICD-10-CM

## 2024-12-02 NOTE — TELEPHONE ENCOUNTER
Patient called to request a refill for their rosuvastatin (CRESTOR) 20 MG tablet advised a refill was requested on 11/30/2024 and is pending approval. Patient verbalized understanding and is in agreement.      Patient has upcoming appointment 1/29/2025

## 2024-12-05 RX ORDER — ROSUVASTATIN CALCIUM 20 MG/1
20 TABLET, COATED ORAL
Qty: 90 TABLET | Refills: 0 | Status: SHIPPED | OUTPATIENT
Start: 2024-12-05

## 2024-12-07 DIAGNOSIS — I20.89 CARDIAC SYNDROME X (HCC): ICD-10-CM

## 2024-12-09 RX ORDER — METOPROLOL TARTRATE 25 MG/1
25 TABLET, FILM COATED ORAL EVERY 12 HOURS SCHEDULED
Qty: 180 TABLET | Refills: 0 | Status: SHIPPED | OUTPATIENT
Start: 2024-12-09

## 2024-12-11 ENCOUNTER — OFFICE VISIT (OUTPATIENT)
Dept: FAMILY MEDICINE CLINIC | Facility: HOSPITAL | Age: 74
End: 2024-12-11
Payer: MEDICARE

## 2024-12-11 VITALS
DIASTOLIC BLOOD PRESSURE: 70 MMHG | WEIGHT: 121 LBS | BODY MASS INDEX: 24.39 KG/M2 | HEART RATE: 72 BPM | HEIGHT: 59 IN | OXYGEN SATURATION: 98 % | SYSTOLIC BLOOD PRESSURE: 132 MMHG

## 2024-12-11 DIAGNOSIS — E55.9 VITAMIN D DEFICIENCY: ICD-10-CM

## 2024-12-11 DIAGNOSIS — D03.59 MELANOMA IN SITU OF BACK (HCC): ICD-10-CM

## 2024-12-11 DIAGNOSIS — J45.40 MODERATE PERSISTENT ASTHMA WITHOUT COMPLICATION: ICD-10-CM

## 2024-12-11 DIAGNOSIS — H61.23 BILATERAL IMPACTED CERUMEN: ICD-10-CM

## 2024-12-11 DIAGNOSIS — Z13.0 SCREENING FOR DEFICIENCY ANEMIA: ICD-10-CM

## 2024-12-11 DIAGNOSIS — Z00.00 MEDICARE ANNUAL WELLNESS VISIT, SUBSEQUENT: ICD-10-CM

## 2024-12-11 DIAGNOSIS — Z76.89 ENCOUNTER TO ESTABLISH CARE WITH NEW DOCTOR: Primary | ICD-10-CM

## 2024-12-11 DIAGNOSIS — I20.1 VASOSPASTIC ANGINA (HCC): ICD-10-CM

## 2024-12-11 DIAGNOSIS — I20.89 CARDIAC SYNDROME X (HCC): ICD-10-CM

## 2024-12-11 DIAGNOSIS — R03.0 WHITE COAT SYNDROME WITHOUT HYPERTENSION: ICD-10-CM

## 2024-12-11 DIAGNOSIS — E78.5 DYSLIPIDEMIA: ICD-10-CM

## 2024-12-11 DIAGNOSIS — E03.9 HYPOTHYROIDISM (ACQUIRED): ICD-10-CM

## 2024-12-11 DIAGNOSIS — E87.1 CHRONIC HYPONATREMIA: ICD-10-CM

## 2024-12-11 PROCEDURE — G0439 PPPS, SUBSEQ VISIT: HCPCS | Performed by: NURSE PRACTITIONER

## 2024-12-11 PROCEDURE — 99213 OFFICE O/P EST LOW 20 MIN: CPT | Performed by: NURSE PRACTITIONER

## 2024-12-11 PROCEDURE — 99203 OFFICE O/P NEW LOW 30 MIN: CPT | Performed by: NURSE PRACTITIONER

## 2024-12-11 NOTE — ASSESSMENT & PLAN NOTE
History of chronic hyponatremia.  Today she denies any fatigue, nausea vomiting, headache or decreased concentration.  She is not on any medications that could induce hyponatremia per chart review.  She reports she is optimizing her hydration and nutrition.  Her bowel and bladder function are at baseline.  Will recheck CMP.  Orders:    Comprehensive metabolic panel; Future

## 2024-12-11 NOTE — ASSESSMENT & PLAN NOTE
Blood pressure mildly elevated at today's office visit at 132/70.  She reports history of whitecoat syndrome without diagnosis of hypertension.  She reports she monitors her blood pressure on a routine basis at home with an upper arm cuff less than 5 years old and results typically less than 120/80.  Will check routine orders including TSH, CMP

## 2024-12-11 NOTE — PATIENT INSTRUCTIONS
Please complete fasting blood work.  No caffeine/food 8 hours prior.  Drink plenty of water.   Please bring in willing will/advanced directive.   Call ENT for routine ear lavage.   Obtain your prior records from [facility]

## 2024-12-11 NOTE — ASSESSMENT & PLAN NOTE
History of hypothyroidism with medication adherence to levothyroxine 50 mcg p.o. daily.  Will recheck TSH.  Orders:    TSH, 3rd generation with Free T4 reflex; Future

## 2024-12-11 NOTE — ASSESSMENT & PLAN NOTE
History of moderate persistent asthma.  Reports medication adherence to Trelegy, with occasional use of albuterol rescue inhaler.  She denies any paroxysmal dyspnea.  She avoiding triggering irritants.  She is followed closely by pulmonary.

## 2024-12-11 NOTE — PROGRESS NOTES
Name: Lupe Azul      : 1950      MRN: [id number]  Encounter Provider: ERICH Cullen  Encounter Date: 2024   Encounter department: [facility] PRIMARY CARE [address]    Assessment & Plan  Encounter to establish care with new doctor    Orders:    Comprehensive metabolic panel; Future    Medicare annual wellness visit, subsequent         White coat syndrome without hypertension  Blood pressure mildly elevated at today's office visit at 132/70.  She reports history of whitecoat syndrome without diagnosis of hypertension.  She reports she monitors her blood pressure on a routine basis at home with an upper arm cuff less than 5 years old and results typically less than 120/80.  Will check routine orders including TSH, CMP       Chronic hyponatremia  History of chronic hyponatremia.  Today she denies any fatigue, nausea vomiting, headache or decreased concentration.  She is not on any medications that could induce hyponatremia per chart review.  She reports she is optimizing her hydration and nutrition.  Her bowel and bladder function are at baseline.  Will recheck CMP.  Orders:    Comprehensive metabolic panel; Future    Hypothyroidism (acquired)  History of hypothyroidism with medication adherence to levothyroxine 50 mcg p.o. daily.  Will recheck TSH.  Orders:    TSH, 3rd generation with Free T4 reflex; Future    Screening for deficiency anemia    Orders:    CBC and differential; Future    Dyslipidemia  History of dyslipidemia with medication adherence to Crestor 20 mg p.o. daily.  She denies any side effects with this medication.  Will recheck lipid panel.  Orders:    Lipid panel; Future    Vitamin D deficiency    Orders:    Vitamin D 25 hydroxy; Future    Melanoma in situ of back (HCC)  History of melanoma status post excision in situ now.  Follows up routinely with dermatology.  Denies any current unusual growths, changes in existing moles.       Cardiac syndrome X (HCC)          Moderate persistent asthma without complication  History of moderate persistent asthma.  Reports medication adherence to Trelegy, with occasional use of albuterol rescue inhaler.  She denies any paroxysmal dyspnea.  She avoiding triggering irritants.  She is followed closely by pulmonary.       Vasospastic angina (HCC)  History of vasospastic versus microvascular angina.  Reports symptoms are well-controlled with metoprolol 25 mg every 12 hours.  Followed closely by cardiology.         Bilateral impacted cerumen  Recurrent bilateral cerumen impaction.  Reports she follows ENT closely due to her narrow ear canals and has an upcoming appointment for ear lavage.         Depression Screening and Follow-up Plan: Patient was screened for depression during today's encounter. They screened negative with a PHQ-2 score of 0.      Preventive health issues were discussed with patient, and age appropriate screening tests were ordered as noted in patient's After Visit Summary. Personalized health advice and appropriate referrals for health education or preventive services given if needed, as noted in patient's After Visit Summary.    History of Present Illness     Here to establish care; formal patient at [facility].  Offers no concerns at today's office visit.    Endocrinology: [facility]  ENT who flushes ears periodically due to narrow ear canals.   Dermatology for hx melanoma  Pulmonary Dr. Ji  Cardiology Dr. Gilmore  GYN: [facility]  GI: [facility] UTD in care gaps.          Patient Care Team:  Janice Schofield DO as PCP - General (Internal Medicine)    Review of Systems   Constitutional: Negative.  Negative for activity change, appetite change, chills, fatigue, fever and unexpected weight change.   HENT:  Positive for hearing loss. Negative for congestion, ear pain, postnasal drip, sinus pain and trouble swallowing.    Eyes: Negative.    Respiratory:  Negative for cough, chest  tightness, shortness of breath and wheezing.    Cardiovascular: Negative.  Negative for chest pain, palpitations and leg swelling.   Gastrointestinal: Negative.  Negative for abdominal pain, constipation and diarrhea.   Endocrine: Negative.    Genitourinary: Negative.  Negative for difficulty urinating and dysuria.   Musculoskeletal: Negative.  Negative for arthralgias.   Skin: Negative.    Allergic/Immunologic: Negative.  Negative for immunocompromised state.   Neurological: Negative.  Negative for dizziness and light-headedness.   Hematological: Negative.    Psychiatric/Behavioral: Negative.  Negative for dysphoric mood and sleep disturbance. The patient is not nervous/anxious.      Medical History Reviewed by provider this encounter:  Tobacco  Allergies  Meds  Problems  Med Hx  Surg Hx  Fam Hx       Annual Wellness Visit Questionnaire   Lupe is here for her Subsequent Wellness visit.     Health Risk Assessment:   Patient rates overall health as fair. Patient feels that their physical health rating is same. Patient is very satisfied with their life. Eyesight was rated as same. Hearing was rated as slightly better. Patient feels that their emotional and mental health rating is same. Patients states they are never, rarely angry. Patient states they are sometimes unusually tired/fatigued. Pain experienced in the last 7 days has been none. Patient states that she has experienced no weight loss or gain in last 6 months. UTD with eye and dental exam    Depression Screening:   PHQ-2 Score: 0      Fall Risk Screening:   In the past year, patient has experienced: no history of falling in past year      Urinary Incontinence Screening:   Has prolapse -stress incontinence     Home Safety:  Patient does not have trouble with stairs inside or outside of their home. Patient has working smoke alarms and has working carbon monoxide detector. Home safety hazards include: none.     Nutrition:   Current diet is Regular.      Medications:   Patient is currently taking over-the-counter supplements. OTC medications include: see medication list. Patient is able to manage medications.     Activities of Daily Living (ADLs)/Instrumental Activities of Daily Living (IADLs):   Walk and transfer into and out of bed and chair?: Yes  Dress and groom yourself?: Yes    Bathe or shower yourself?: Yes    Feed yourself? Yes  Do your laundry/housekeeping?: Yes  Manage your money, pay your bills and track your expenses?: Yes  Make your own meals?: Yes    Do your own shopping?: Yes    Previous Hospitalizations:   Any hospitalizations or ED visits within the last 12 months?: No      Advance Care Planning:   Living will: Yes    Durable POA for healthcare: Yes    Advanced directive: Yes    Advanced directive counseling given: Yes      PREVENTIVE SCREENINGS      Cardiovascular Screening:    General: Screening Current      Diabetes Screening:     General: Screening Current      Colorectal Cancer Screening:     General: Screening Current      Breast Cancer Screening:     General: Screening Current      Cervical Cancer Screening:    General: Screening Current      Osteoporosis Screening:    General: Screening Current      Lung Cancer Screening:     General: Screening Not Indicated    Screening, Brief Intervention, and Referral to Treatment (SBIRT)    Screening      AUDIT-C Screenin) How often did you have a drink containing alcohol in the past year? never  2) How many drinks did you have on a typical day when you were drinking in the past year? 0  3) How often did you have 6 or more drinks on one occasion in the past year? never    AUDIT-C Score: 0  Interpretation: Score 0-2 (female): Negative screen for alcohol misuse    Single Item Drug Screening:  How often have you used an illegal drug (including marijuana) or a prescription medication for non-medical reasons in the past year? never    Single Item Drug Screen Score: 0  Interpretation: Negative screen  "for possible drug use disorder    Other Counseling Topics:   Car/seat belt/driving safety, skin self-exam, sunscreen and calcium and vitamin D intake and regular weightbearing exercise.     Social Drivers of Health     Food Insecurity: No Food Insecurity (12/11/2024)    Hunger Vital Sign     Worried About Running Out of Food in the Last Year: Never true     Ran Out of Food in the Last Year: Never true   Transportation Needs: No Transportation Needs (12/11/2024)    PRAPARE - Transportation     Lack of Transportation (Medical): No     Lack of Transportation (Non-Medical): No   Housing Stability: Low Risk  (12/11/2024)    Housing Stability Vital Sign     Unable to Pay for Housing in the Last Year: No     Number of Times Moved in the Last Year: 0     Homeless in the Last Year: No   Utilities: Not At Risk (12/11/2024)    [facility] Utilities     Threatened with loss of utilities: No     No results found.    Objective   /70   Pulse 72   Ht 4' 11\" (1.499 m)   Wt 54.9 kg (121 lb)   SpO2 98%   BMI 24.44 kg/m²     Physical Exam  Vitals and nursing note reviewed.   Constitutional:       General: She is not in acute distress.     Appearance: She is well-developed.   HENT:      Head: Normocephalic and atraumatic.      Right Ear: Decreased hearing noted. There is impacted cerumen.      Left Ear: Decreased hearing noted. There is impacted cerumen.      Ears:      Comments: Bilateral hearing aides  Eyes:      Conjunctiva/sclera: Conjunctivae normal.   Cardiovascular:      Rate and Rhythm: Normal rate and regular rhythm.      Heart sounds: Murmur heard.   Pulmonary:      Effort: Pulmonary effort is normal. No respiratory distress.      Breath sounds: Normal breath sounds. No wheezing, rhonchi or rales.   Abdominal:      Palpations: Abdomen is soft.      Tenderness: There is no abdominal tenderness.   Musculoskeletal:         General: No swelling.      Cervical back: Neck supple.      Right lower leg: No edema.      Left lower " leg: No edema.   Skin:     General: Skin is warm and dry.      Capillary Refill: Capillary refill takes less than 2 seconds.   Neurological:      Mental Status: She is alert.   Psychiatric:         Mood and Affect: Mood normal.

## 2024-12-11 NOTE — ASSESSMENT & PLAN NOTE
History of melanoma status post excision in situ now.  Follows up routinely with dermatology.  Denies any current unusual growths, changes in existing moles.

## 2024-12-11 NOTE — ASSESSMENT & PLAN NOTE
History of vasospastic versus microvascular angina.  Reports symptoms are well-controlled with metoprolol 25 mg every 12 hours.  Followed closely by cardiology.

## 2024-12-12 ENCOUNTER — TELEPHONE (OUTPATIENT)
Dept: ADMINISTRATIVE | Facility: OTHER | Age: 74
End: 2024-12-12

## 2024-12-12 NOTE — TELEPHONE ENCOUNTER
Upon review of the In Basket request we were able to locate, review, and update the patient chart as requested for DEXA Scan.    Any additional questions or concerns should be emailed to the Practice Liaisons via the appropriate education email address, please do not reply via In Basket.    Thank you  Dru Crystal MA   [facility]

## 2024-12-12 NOTE — TELEPHONE ENCOUNTER
----- Message from ERICH Frey sent at 12/11/2024  4:36 PM EST -----  Please update care gaps as Mrs. Azul completed her DXA 9/27/2024.  Thanks!

## 2024-12-18 ENCOUNTER — TELEPHONE (OUTPATIENT)
Age: 74
End: 2024-12-18

## 2024-12-18 NOTE — TELEPHONE ENCOUNTER
Patient called in regards to 12/11 labs.  Patient wants to know if she should get those labs now or wait until closer to June appointment.  Please advise and contact patient.

## 2024-12-26 ENCOUNTER — TELEPHONE (OUTPATIENT)
Age: 74
End: 2024-12-26

## 2025-01-01 LAB
25(OH)D3 SERPL-MCNC: 32 NG/ML (ref 30–100)
ALBUMIN SERPL-MCNC: 4.3 G/DL (ref 3.6–5.1)
ALBUMIN/GLOB SERPL: 2 (CALC) (ref 1–2.5)
ALP SERPL-CCNC: 59 U/L (ref 37–153)
ALT SERPL-CCNC: 23 U/L (ref 6–29)
AST SERPL-CCNC: 20 U/L (ref 10–35)
BASOPHILS # BLD AUTO: 49 CELLS/UL (ref 0–200)
BASOPHILS NFR BLD AUTO: 0.5 %
BILIRUB SERPL-MCNC: 0.6 MG/DL (ref 0.2–1.2)
BUN SERPL-MCNC: 14 MG/DL (ref 7–25)
BUN/CREAT SERPL: 25 (CALC) (ref 6–22)
CALCIUM SERPL-MCNC: 9.4 MG/DL (ref 8.6–10.4)
CHLORIDE SERPL-SCNC: 104 MMOL/L (ref 98–110)
CHOLEST SERPL-MCNC: 148 MG/DL
CHOLEST/HDLC SERPL: 2.1 (CALC)
CO2 SERPL-SCNC: 29 MMOL/L (ref 20–32)
CREAT SERPL-MCNC: 0.56 MG/DL (ref 0.6–1)
EOSINOPHIL # BLD AUTO: 78 CELLS/UL (ref 15–500)
EOSINOPHIL NFR BLD AUTO: 0.8 %
ERYTHROCYTE [DISTWIDTH] IN BLOOD BY AUTOMATED COUNT: 12.7 % (ref 11–15)
GFR/BSA.PRED SERPLBLD CYS-BASED-ARV: 95 ML/MIN/1.73M2
GLOBULIN SER CALC-MCNC: 2.1 G/DL (CALC) (ref 1.9–3.7)
GLUCOSE SERPL-MCNC: 88 MG/DL (ref 65–99)
HCT VFR BLD AUTO: 42.8 % (ref 35–45)
HDLC SERPL-MCNC: 70 MG/DL
HGB BLD-MCNC: 13.8 G/DL (ref 11.7–15.5)
LDLC SERPL CALC-MCNC: 62 MG/DL (CALC)
LYMPHOCYTES # BLD AUTO: 1959 CELLS/UL (ref 850–3900)
LYMPHOCYTES NFR BLD AUTO: 20.2 %
MCH RBC QN AUTO: 30.6 PG (ref 27–33)
MCHC RBC AUTO-ENTMCNC: 32.2 G/DL (ref 32–36)
MCV RBC AUTO: 94.9 FL (ref 80–100)
MONOCYTES # BLD AUTO: 844 CELLS/UL (ref 200–950)
MONOCYTES NFR BLD AUTO: 8.7 %
NEUTROPHILS # BLD AUTO: 6771 CELLS/UL (ref 1500–7800)
NEUTROPHILS NFR BLD AUTO: 69.8 %
NONHDLC SERPL-MCNC: 78 MG/DL (CALC)
PLATELET # BLD AUTO: 236 THOUSAND/UL (ref 140–400)
PMV BLD REES-ECKER: 9.8 FL (ref 7.5–12.5)
POTASSIUM SERPL-SCNC: 4.2 MMOL/L (ref 3.5–5.3)
PROT SERPL-MCNC: 6.4 G/DL (ref 6.1–8.1)
RBC # BLD AUTO: 4.51 MILLION/UL (ref 3.8–5.1)
SODIUM SERPL-SCNC: 140 MMOL/L (ref 135–146)
TRIGL SERPL-MCNC: 80 MG/DL
WBC # BLD AUTO: 9.7 THOUSAND/UL (ref 3.8–10.8)

## 2025-01-02 ENCOUNTER — RESULTS FOLLOW-UP (OUTPATIENT)
Dept: FAMILY MEDICINE CLINIC | Facility: HOSPITAL | Age: 75
End: 2025-01-02

## 2025-01-29 ENCOUNTER — OFFICE VISIT (OUTPATIENT)
Dept: CARDIOLOGY CLINIC | Facility: CLINIC | Age: 75
End: 2025-01-29
Payer: MEDICARE

## 2025-01-29 VITALS
HEIGHT: 59 IN | WEIGHT: 117 LBS | SYSTOLIC BLOOD PRESSURE: 130 MMHG | BODY MASS INDEX: 23.59 KG/M2 | DIASTOLIC BLOOD PRESSURE: 84 MMHG | HEART RATE: 75 BPM

## 2025-01-29 DIAGNOSIS — I20.1 VASOSPASTIC ANGINA (HCC): ICD-10-CM

## 2025-01-29 DIAGNOSIS — I20.89 CARDIAC SYNDROME X (HCC): Primary | ICD-10-CM

## 2025-01-29 PROBLEM — E87.1 CHRONIC HYPONATREMIA: Status: RESOLVED | Noted: 2023-07-08 | Resolved: 2025-01-29

## 2025-01-29 PROBLEM — R94.39 ABNORMAL CARDIOVASCULAR STRESS TEST: Status: RESOLVED | Noted: 2023-03-13 | Resolved: 2025-01-29

## 2025-01-29 PROCEDURE — G2211 COMPLEX E/M VISIT ADD ON: HCPCS | Performed by: INTERNAL MEDICINE

## 2025-01-29 PROCEDURE — 99214 OFFICE O/P EST MOD 30 MIN: CPT | Performed by: INTERNAL MEDICINE

## 2025-01-29 PROCEDURE — 93000 ELECTROCARDIOGRAM COMPLETE: CPT | Performed by: INTERNAL MEDICINE

## 2025-01-29 RX ORDER — METOPROLOL TARTRATE 25 MG/1
25 TABLET, FILM COATED ORAL EVERY 12 HOURS SCHEDULED
Qty: 180 TABLET | Refills: 3 | Status: CANCELLED | OUTPATIENT
Start: 2025-01-29

## 2025-01-29 RX ORDER — ROSUVASTATIN CALCIUM 20 MG/1
20 TABLET, COATED ORAL
Qty: 90 TABLET | Refills: 3 | Status: SHIPPED | OUTPATIENT
Start: 2025-01-29

## 2025-01-29 RX ORDER — NITROGLYCERIN 0.4 MG/1
0.4 TABLET SUBLINGUAL
Qty: 30 TABLET | Refills: 0 | Status: SHIPPED | OUTPATIENT
Start: 2025-01-29

## 2025-01-29 RX ORDER — METOPROLOL SUCCINATE 50 MG/1
50 TABLET, EXTENDED RELEASE ORAL DAILY
Qty: 90 TABLET | Refills: 3 | Status: SHIPPED | OUTPATIENT
Start: 2025-01-29

## 2025-01-29 RX ORDER — HYDROCORTISONE 25 MG/G
CREAM TOPICAL
COMMUNITY
Start: 2025-01-14

## 2025-01-29 NOTE — PROGRESS NOTES
Cardiology Outpatient Follow-Up Note - Lupe Azul 74 y.o. female MRN: [id number]      Assessment/Plan:    1. Cardiac syndrome X (HCC) (Primary)  Well managed without recent angina  Change metoprolol to XL 50 mg daily  Continue rosuvastatin 20 mg daily  Continue nitro SL PRN  - POCT ECG  - rosuvastatin (CRESTOR) 20 MG tablet; Take 1 tablet (20 mg total) by mouth daily with dinner  Dispense: 90 tablet; Refill: 3  - metoprolol succinate (TOPROL-XL) 50 mg 24 hr tablet; Take 1 tablet (50 mg total) by mouth daily  Dispense: 90 tablet; Refill: 3  - nitroglycerin (NITROSTAT) 0.4 mg SL tablet; Place 1 tablet (0.4 mg total) under the tongue every 5 (five) minutes as needed for chest pain  Dispense: 30 tablet; Refill: 0    2. Vasospastic angina (HCC)  As above  - rosuvastatin (CRESTOR) 20 MG tablet; Take 1 tablet (20 mg total) by mouth daily with dinner  Dispense: 90 tablet; Refill: 3        We will see Lupe Azul back in 12 months for routine follow-up.    Subjective:     HPI: Lupe Azul is a 74 y.o. year old female with cardiac syndrome X, vasovagal syncope, moderate mitral regurgitation, hypothyroidism, asthma presenting for follow-up.      She had an abnormal stress echo on 2/22/2023 which showed both ST segment deviation/depression and stress-induced inferior wall motion abnormality at 7.0 METS of exertion.  Subsequent cardiac catheterization on 3/13/2023 did not show obstructive epicardial CAD; although she did have ST deviation during the study concerning for vasospasm.  She has responded positively to antianginal therapy with metoprolol, and we have been treating her with presumed vasospastic/microvascular angina.    She was admitted to [facility] in July 2023 with syncope. DC summary 7/7/23 reviewed. She was seen by neurology and cardiology during this hospitalization. No significant abnormalities were found. CTA head and neck 7/8/23 showed no vascular issues. Echo 7/10/23 was unchanged from prior.   Telemetry was unremarkable. Etiology thought to be due to vasovagal event. She has since followed with neurology with no specific plans for further work-up in regards to syncope.      Cardiac Testing:    Echo 7/10/23 Interpretation Summary         Left Ventricle: Left ventricular cavity size is normal. Wall thickness is normal. The left ventricular ejection fraction is 65%. Systolic function is normal. Wall motion is normal. Diastolic function is mildly abnormal, consistent with grade I (abnormal) relaxation.    Left Atrium: The atrium is mildly dilated.    Aortic Valve: There is mild regurgitation.    Mitral Valve: There is mild annular calcification. There is moderate regurgitation.    Tricuspid Valve: There is mild to moderate regurgitation.    Pulmonic Valve: There is mild regurgitation.    Pulmonary Artery: The pulmonary artery systolic pressure is upper normal.       Echo stress from 2/22/2023; patient exercised for 6 minutes 1 second, 169 bpm, 115% MPHR, 7.0 METS limited by chest pain.  There are horizontal, 1 mm ST depressions in the inferior leads II, III, aVF, precordial ST segment changes do not meet diagnostic criteria for ischemia.  EKG is overall consistent with ischemia.  The baseline rest echocardiogram shows normal ejection fraction 55 to 60% with no regional wall motion abnormalities; at peak stress there is akinesis of the basal to mid inferior wall as well as a failure of the LV to properly augment.    Coronary angiogram 3/13/23  Impression         No angiographic evidence of obstructive CAD    Transient EKG ST/TW changes suggestive of possible vasospasm    LVEDP normal without gradient on LV-AO pullback           EKGs, personally reviewed:  1/29/25 - NSR, 75 bpm, normal study    Relevant Labs & Results:  Lipid panel 3/13/23 -- , LDL 36 mg/dL  LDL direct 3/13/23 -- 42 mg/dL   CMP 7/10/23 -- K 3.5, Cr 0.55    CMP 12/31/2024--creatinine 0.56, potassium 4.2, sodium 140  Lipid panel  "12/31/2024--, TG 80, HDL 70, LDL 62 mg/dL  CBC 12/31/2024--Hgb 13.8 g/dL    ROS:  Review of Systems:  Review of Systems    Objective:     Vitals:   Vitals:    01/29/25 1550   Weight: 53.1 kg (117 lb)   Height: 4' 11\" (1.499 m)    Body surface area is 1.47 meters squared.  Wt Readings from Last 3 Encounters:   01/29/25 53.1 kg (117 lb)   12/11/24 54.9 kg (121 lb)   10/09/24 55.2 kg (121 lb 9.6 oz)       Physical Exam:    General: Lupe Azul is a well appearing female, in no acute distress, sitting comfortably  HEENT: moist mucous membranes, EOMI  Neck:  No JVD, supple, trachea midline  Cardiovascular: unremarkable S1/S2, regular rate and rhythm, no murmurs, rubs or gallops  Pulmonary: normal respiratory effort, CTAB  Abdomen: soft and nondistended  Extremities: No lower extremity edema. Warm and well perfused extremities.  Neuro: no focal motor deficits, AAOx3 (person, place, time)  Psych: Normal mood and affect, cooperative        Medications (at the START of this encounter):  Outpatient Medications Prior to Visit   Medication Sig Dispense Refill    albuterol (PROVENTIL HFA,VENTOLIN HFA) 90 mcg/act inhaler Inhale 2 puffs every 4 (four) hours as needed for shortness of breath 18 g 3    Cholecalciferol 50 MCG (2000 UT) CAPS Take 1 tablet by mouth daily OCT -  MARCH 4000 IU  APRIL - SEPT 3000 IU      fluticasone (VERAMYST) 27.5 MCG/SPRAY nasal spray 1 spray into each nostril daily      fluticasone-umeclidinium-vilanterol (Trelegy Ellipta) 100-62.5-25 mcg/actuation inhaler Inhale 1 puff daily Rinse mouth after use. 180 blister 5    hydrocortisone 2.5 % cream APPLY TWICE A DAY FOR 4 DAYS A WEEK AS NEEDED TO RASH IN SKIN FOLDS      levothyroxine 50 mcg tablet Take 50 mcg by mouth daily in the early morning      metoprolol tartrate (LOPRESSOR) 25 mg tablet TAKE 1 TABLET (25 MG TOTAL) BY MOUTH EVERY 12 (TWELVE) HOURS 180 tablet 0    metroNIDAZOLE (METROGEL) 1 % gel Apply 1 application topically daily      " "Multiple Vitamins-Minerals (PRESERVISION AREDS 2 PO) Take 1 capsule by mouth 2 (two) times a day with meals      ondansetron (ZOFRAN) 4 mg tablet Take 4 mg by mouth every 8 (eight) hours as needed for nausea      polyethylene glycol-propylene glycol (SYSTANE) 0.4-0.3 % Apply 1 drop to eye Three times a day      raloxifene (EVISTA) 60 mg tablet Take 60 mg by mouth daily      rosuvastatin (CRESTOR) 20 MG tablet Take 1 tablet (20 mg total) by mouth daily with dinner 90 tablet 0    sucralfate (CARAFATE) 1 g tablet Take 1 g by mouth daily with lunch One tablet with lunch and dinner      levalbuterol (Xopenex) 0.63 mg/3 mL nebulizer solution Take 3 mL (0.63 mg total) by nebulization 3 (three) times a day (Patient not taking: Reported on 12/11/2024) 90 mL 5    nitroglycerin (NITROSTAT) 0.4 mg SL tablet Place 1 tablet (0.4 mg total) under the tongue every 5 (five) minutes as needed for chest pain (Patient not taking: Reported on 12/11/2024) 30 tablet 0    nystatin (MYCOSTATIN) cream Apply 15 g topically 2 (two) times a day      Peak Flow Meter NANDA 1 Act by Does not apply route 2 (two) times a day      Premarin vaginal cream APPLY 1 G INTO THE VAGINA ONCE A WEEK.       No facility-administered medications prior to visit.           This note was completed in part utilizing Dragon [employer] One voice recognition software. Grammatical errors, random word insertion, spelling mistakes, occasional wrong word or \"sound-alike\" substitutions and incomplete sentences may be an occasional consequence of the system secondary to software limitations, ambient noise and hardware issues. At the time of dictation, efforts were made to edit, clarify and /or correct errors.  Please read the chart carefully and recognize, using context, where substitutions have occurred.  If you have any questions or concerns about the context, text or information contained within the body of this dictation, please contact myself, the provider, for further " clarification.

## 2025-02-10 ENCOUNTER — OFFICE VISIT (OUTPATIENT)
Age: 75
End: 2025-02-10
Payer: MEDICARE

## 2025-02-10 VITALS
HEIGHT: 59 IN | SYSTOLIC BLOOD PRESSURE: 132 MMHG | TEMPERATURE: 98.6 F | WEIGHT: 121.6 LBS | BODY MASS INDEX: 24.52 KG/M2 | DIASTOLIC BLOOD PRESSURE: 80 MMHG | HEART RATE: 72 BPM | OXYGEN SATURATION: 98 %

## 2025-02-10 DIAGNOSIS — J45.40 MODERATE PERSISTENT ASTHMA WITHOUT COMPLICATION: Primary | ICD-10-CM

## 2025-02-10 PROCEDURE — 99214 OFFICE O/P EST MOD 30 MIN: CPT | Performed by: NURSE PRACTITIONER

## 2025-02-10 PROCEDURE — G2211 COMPLEX E/M VISIT ADD ON: HCPCS | Performed by: NURSE PRACTITIONER

## 2025-02-10 RX ORDER — PREDNISONE 20 MG/1
40 TABLET ORAL DAILY
Qty: 10 TABLET | Refills: 0 | Status: SHIPPED | OUTPATIENT
Start: 2025-02-10 | End: 2025-02-15

## 2025-02-10 NOTE — PROGRESS NOTES
Pulmonary Follow-Up Note   Lupe Azul 74 y.o. female MRN: [id number]  2/10/2025      Assessment/Plan:    Problem List Items Addressed This Visit       Moderate persistent asthma without complication - Primary    Exam is clear today however she has noted increased need for rescue inhaler. Discussed with patient there may be a mild exacerbation developing.  Start prednisone x5 days burst  Continue Trelegy daily - try taking before breakfast.  Albuterol HFA up to 4x per day, or Xopenex via nebulizer up to 4x per day  We reviewed environmental controls - may need to increase humidity, she is already avoiding dust which is a potent trigger.           Vaccines: UTD    Return in about 4 months (around 6/10/2025).    All of Aicha's questions were answered prior to leaving the office today.  She is aware to call our office with any further questions or concerns.    History of Present Illness   Reason for Visit: Follow up  Chief Complaint: asthma  HPI: Lupe Azul is a 74 y.o. female who presents to the office today for follow up. Overall asthma course has been smooth although she did have prednisone taper in mid-November for an exacerbation.    In the past month she has noted need for albuterol HFA 1-2x per day due to shortness of breath. Symptoms develop in the morning as she is finishing breakfast - before she takes Trelegy.  Also notes prolonged speech is a trigger. She tries to avoid using inhaler so there are times when she will try to catch her breath. Never has asthma during the night. Denies cough or wheeze.    Review of Systems   Constitutional:  Negative for appetite change and fever.   HENT:  Positive for postnasal drip. Negative for ear pain, rhinorrhea, sneezing, sore throat and trouble swallowing.    Respiratory:  Positive for shortness of breath.    Cardiovascular:  Positive for chest pain.   Musculoskeletal:  Negative for myalgias.   Neurological:  Negative for headaches.     Please note that a  14-point review of systems was performed to include Constitutional, HEENT, Respiratory, CVS, GI, , Musculoskeletal, Integumentary, Neurologic, Rheumatologic, Endocrinologic, Psychiatric, Lymphatic, and Hematologic/Oncologic systems were reviewed and are negative unless otherwise stated in HPI. Positive and negative findings pertinent to this evaluation are incorporated into the history of present illness.       Historical Information   Past Medical History:   Diagnosis Date    Abnormal cardiovascular stress test 03/13/2023    Asthma     Cancer (HCC) Thyorid & Melanoma    Cataract     Coronary artery disease     Disease of thyroid gland     GERD (gastroesophageal reflux disease)     Hernia     Hyperlipidemia     Kidney stone     Melanoma (HCC) 2022     Past Surgical History:   Procedure Laterality Date    CARDIAC CATHETERIZATION N/A 03/13/2023    Procedure: Cardiac Coronary Angiogram;  Surgeon: Emily Andrade DO;  Location: [facility];  Service: Cardiology    CARDIAC CATHETERIZATION  03/13/2023    Procedure: Cardiac catheterization;  Surgeon: Emily Andrade DO;  Location: [facility];  Service: Cardiology    CARDIAC CATHETERIZATION Left 03/13/2023    Procedure: Cardiac Left Heart Cath;  Surgeon: Emily Andrade DO;  Location: [facility];  Service: Cardiology    COLONOSCOPY      COLONOSCOPY  9/20/20    EYE SURGERY      ROTATOR CUFF REPAIR Right     6/2022    THYROID SURGERY      UPPER GASTROINTESTINAL ENDOSCOPY  4/11/19     Family History   Problem Relation Age of Onset    Hypertension Father     Early death Father         age 42    Cancer Maternal Aunt         lip cancer    Vision loss Maternal Aunt     Early death Mother         age 59     Social History   Social History     Substance and Sexual Activity   Alcohol Use Not Currently     Social History     Substance and Sexual Activity   Drug Use Never     Social History     Tobacco Use   Smoking Status Never    Passive exposure:  Past   Smokeless Tobacco Never     E-Cigarette/Vaping    E-Cigarette Use Never User      E-Cigarette/Vaping Substances    Nicotine No     THC No     CBD No     Flavoring No     Other No     Unknown No        Meds/Allergies     Current Outpatient Medications:     albuterol (PROVENTIL HFA,VENTOLIN HFA) 90 mcg/act inhaler, Inhale 2 puffs every 4 (four) hours as needed for shortness of breath, Disp: 18 g, Rfl: 3    Cholecalciferol 50 MCG (2000 UT) CAPS, Take 1 tablet by mouth daily OCT -  MARCH 4000 IU APRIL - SEPT 3000 IU, Disp: , Rfl:     fluticasone (VERAMYST) 27.5 MCG/SPRAY nasal spray, 1 spray into each nostril daily, Disp: , Rfl:     fluticasone-umeclidinium-vilanterol (Trelegy Ellipta) 100-62.5-25 mcg/actuation inhaler, Inhale 1 puff daily Rinse mouth after use., Disp: 180 blister, Rfl: 5    levalbuterol (Xopenex) 0.63 mg/3 mL nebulizer solution, Take 3 mL (0.63 mg total) by nebulization 3 (three) times a day (Patient taking differently: Take 0.63 mg by nebulization every 6 (six) hours as needed), Disp: 90 mL, Rfl: 5    levothyroxine 50 mcg tablet, Take 50 mcg by mouth daily in the early morning, Disp: , Rfl:     metoprolol succinate (TOPROL-XL) 50 mg 24 hr tablet, Take 1 tablet (50 mg total) by mouth daily, Disp: 90 tablet, Rfl: 3    metroNIDAZOLE (METROGEL) 1 % gel, Apply 1 application topically daily, Disp: , Rfl:     Multiple Vitamins-Minerals (PRESERVISION AREDS 2 PO), Take 1 capsule by mouth 2 (two) times a day with meals, Disp: , Rfl:     nitroglycerin (NITROSTAT) 0.4 mg SL tablet, Place 1 tablet (0.4 mg total) under the tongue every 5 (five) minutes as needed for chest pain, Disp: 30 tablet, Rfl: 0    ondansetron (ZOFRAN) 4 mg tablet, Take 4 mg by mouth every 8 (eight) hours as needed for nausea, Disp: , Rfl:     Peak Flow Meter NANDA, 1 Act by Does not apply route 2 (two) times a day, Disp: , Rfl:     polyethylene glycol-propylene glycol (SYSTANE) 0.4-0.3 %, Apply 1 drop to eye Three times a day, Disp: ,  "Rfl:     Premarin vaginal cream, APPLY 1 G INTO THE VAGINA ONCE A WEEK., Disp: , Rfl:     raloxifene (EVISTA) 60 mg tablet, Take 60 mg by mouth daily, Disp: , Rfl:     rosuvastatin (CRESTOR) 20 MG tablet, Take 1 tablet (20 mg total) by mouth daily with dinner, Disp: 90 tablet, Rfl: 3    sucralfate (CARAFATE) 1 g tablet, Take 1 g by mouth daily with lunch One tablet with lunch and dinner (Patient taking differently: Take 1 g by mouth daily One tablet), Disp: , Rfl:   Allergies   Allergen Reactions    Amlodipine Edema and Nausea Only     hands    Amoxicillin Abdominal Pain, GI Intolerance and Nausea Only    Clotrimazole Vomiting    Cyclobenzaprine Nausea Only    Doxycycline Nausea Only, Vomiting and GI Intolerance    Doxycycline Hyclate Syncope and Vomiting    Naproxen Nausea Only    Nystatin Nausea Only    Other      Very sensitive to strong perfumes, sprays, smoke and exercise etc.  Recently dx with asthma    Tramadol Nausea Only       Vitals: Blood pressure 132/80, pulse 72, temperature 98.6 °F (37 °C), temperature source Tympanic, height 4' 11\" (1.499 m), weight 55.2 kg (121 lb 9.6 oz), SpO2 98%. Body mass index is 24.56 kg/m². Oxygen Therapy  SpO2: 98 %      Physical Exam  Vitals reviewed.   Constitutional:       Appearance: Normal appearance.   HENT:      Head: Normocephalic.      Nose: Nose normal.      Mouth/Throat:      Mouth: Mucous membranes are moist.      Pharynx: Oropharynx is clear.   Cardiovascular:      Rate and Rhythm: Normal rate and regular rhythm.      Pulses: Normal pulses.      Heart sounds: Normal heart sounds.   Pulmonary:      Effort: Pulmonary effort is normal.      Breath sounds: Normal breath sounds.   Musculoskeletal:         General: Normal range of motion.   Skin:     General: Skin is warm.      Capillary Refill: Capillary refill takes less than 2 seconds.   Neurological:      General: No focal deficit present.      Mental Status: She is alert and oriented to person, place, and time. " "  Psychiatric:         Mood and Affect: Mood normal.         Behavior: Behavior normal.         Labs:   Lab Results   Component Value Date    WBC 9.7 12/31/2024    HGB 13.8 12/31/2024    HCT 42.8 12/31/2024    MCV 94.9 12/31/2024     12/31/2024    EOSPCT 0.8 12/31/2024    EOSABS 78 12/31/2024    MONOPCT 8.7 12/31/2024     Lab Results   Component Value Date    CALCIUM 9.4 12/31/2024    K 4.2 12/31/2024    CO2 29 12/31/2024     12/31/2024    BUN 14 12/31/2024    CREATININE 0.56 (L) 12/31/2024     Lab Results   Component Value Date    IGE 44.8 11/19/2021     Lab Results   Component Value Date    ALT 23 12/31/2024    AST 20 12/31/2024    ALKPHOS 59 12/31/2024             ERICH Nixon  [facility]        Portions of the record may have been created with voice recognition software.  Occasional wrong word or \"sound a like\" substitutions may have occurred due to the inherent limitations of voice recognition software.  Read the chart carefully and recognize, using context, where substitutions have occurred or contact the dictating provider.  Answers submitted by the patient for this visit:  Pulmonology Questionnaire (Submitted on 2/9/2025)  Chief Complaint: Primary symptoms  Do you have chest tightness?: Yes  Do you have a hoarse voice?: Yes  Chronicity: chronic  When did you first notice your symptoms?: more than 1 year ago  How often do your symptoms occur?: daily  Since you first noticed this problem, how has it changed?: waxing and waning  Do you have shortness of breath that occurs with effort or exertion?: Yes  Do you have ear congestion?: No  Do you have heartburn?: No  Do you have fatigue?: Yes  Do you have nasal congestion?: Yes  Do you have shortness of breath when lying flat?: No  Do you have shortness of breath when you wake up?: No  Have you experienced weight loss?: No  Which of the following makes your symptoms worse?: change in weather, emotional stress, " exercise, exposure to fumes, exposure to smoke  Which of the following makes your symptoms better?: oral steroids, rest

## 2025-02-10 NOTE — ASSESSMENT & PLAN NOTE
Exam is clear today however she has noted increased need for rescue inhaler. Discussed with patient there may be a mild exacerbation developing.  Start prednisone x5 days burst  Continue Trelegy daily - try taking before breakfast.  Albuterol HFA up to 4x per day, or Xopenex via nebulizer up to 4x per day  We reviewed environmental controls - may need to increase humidity, she is already avoiding dust which is a potent trigger.

## 2025-04-02 DIAGNOSIS — J45.50 SEVERE PERSISTENT ASTHMA WITHOUT COMPLICATION: ICD-10-CM

## 2025-04-03 RX ORDER — LEVALBUTEROL INHALATION SOLUTION 0.63 MG/3ML
0.63 SOLUTION RESPIRATORY (INHALATION) 3 TIMES DAILY
Qty: 270 ML | Refills: 1 | Status: SHIPPED | OUTPATIENT
Start: 2025-04-03

## 2025-04-28 ENCOUNTER — APPOINTMENT (OUTPATIENT)
Dept: LAB | Facility: CLINIC | Age: 75
End: 2025-04-28
Attending: INTERNAL MEDICINE
Payer: MEDICARE

## 2025-04-28 DIAGNOSIS — E03.4 IDIOPATHIC ATROPHIC HYPOTHYROIDISM: ICD-10-CM

## 2025-04-28 LAB
T4 FREE SERPL-MCNC: 0.9 NG/DL (ref 0.61–1.12)
TSH SERPL DL<=0.05 MIU/L-ACNC: 1.08 UIU/ML (ref 0.45–4.5)

## 2025-04-28 PROCEDURE — 84443 ASSAY THYROID STIM HORMONE: CPT

## 2025-04-28 PROCEDURE — 36415 COLL VENOUS BLD VENIPUNCTURE: CPT

## 2025-04-28 PROCEDURE — 84439 ASSAY OF FREE THYROXINE: CPT

## 2025-05-12 ENCOUNTER — NURSE TRIAGE (OUTPATIENT)
Age: 75
End: 2025-05-12

## 2025-05-12 NOTE — TELEPHONE ENCOUNTER
"FOLLOW UP: Yes-Appointment given to be seen 5/13 in the [place] office.    REASON FOR CONVERSATION: Asthma Attack    SYMPTOMS: Shortness of breath, not being relieved by rescue meds. Used albuterol 26 x in the last 17 days when this started. No cough, wheezing, fever, chills. C/O soreness and tenderness along sternum. Shortness of breath is moderate. Pulse ox 97-98% room air. Peak flow meter is 370-usually 350.    OTHER: N/A    DISPOSITION: See Within 2 Weeks in Office  Reason for Disposition   Asthma limits exercise or sports    Answer Assessment - Initial Assessment Questions  1. RESPIRATORY STATUS: \"Describe your breathing?\" (e.g., wheezing, shortness of breath, unable to speak, severe coughing)       Shortness of breath  2. ONSET: \"When did this asthma attack begin?\"       17 days ago  3. TRIGGER: \"What do you think triggered this attack?\" (e.g., URI, exposure to pollen or other allergen, tobacco smoke)       Unsure  4. PEAK EXPIRATORY FLOW RATE (PEFR): \"Do you use a peak flow meter?\" If Yes, ask: \"What's the current peak flow? What's your personal best peak flow?\"       370; normal for her is 350  5. SEVERITY: \"How bad is this attack?\"       Moderate  6. ASTHMA MEDICINES:  \"What treatments have you tried?\"       Using inhalers and nebulizers; used nebulizer 26x in the last 17 days  7. INHALED QUICK-RELIEF TREATMENTS FOR THIS ATTACK: \"What treatments have you given yourself so far?\" and \"How many and how often?\" If using an inhaler, ask, \"How many puffs?\" Note: Routine treatments are 2 puffs every 4 hours as needed. Rescue treatments are 4 puffs repeated every 20 minutes, up to three times as needed.       See above  8. OTHER SYMPTOMS: \"Do you have any other symptoms?\" (e.g., chest pain, coughing up yellow sputum, fever, runny nose)      Denies wheezing or cough. Some soreness along sternum with ongoing tenderness  9. O2 SATURATION MONITOR:  \"Do you use an oxygen saturation monitor (pulse oximeter) at home?\" " "If Yes, \"What is your reading (oxygen level) today?\" \"What is your usual oxygen saturation reading?\" (e.g., 95%)      97-98% on room air  10. PREGNANCY: \"Is there any chance you are pregnant?\" \"When was your last menstrual period?\"        NO    Protocols used: Asthma Attack-Adult-[place]    "

## 2025-05-13 ENCOUNTER — OFFICE VISIT (OUTPATIENT)
Age: 75
End: 2025-05-13
Payer: MEDICARE

## 2025-05-13 VITALS
WEIGHT: 118.8 LBS | SYSTOLIC BLOOD PRESSURE: 132 MMHG | BODY MASS INDEX: 23.95 KG/M2 | HEART RATE: 68 BPM | OXYGEN SATURATION: 99 % | HEIGHT: 59 IN | TEMPERATURE: 98.6 F | DIASTOLIC BLOOD PRESSURE: 80 MMHG

## 2025-05-13 DIAGNOSIS — J45.40 MODERATE PERSISTENT ASTHMA WITHOUT COMPLICATION: Primary | ICD-10-CM

## 2025-05-13 DIAGNOSIS — J30.9 ALLERGIC RHINITIS, UNSPECIFIED SEASONALITY, UNSPECIFIED TRIGGER: ICD-10-CM

## 2025-05-13 PROCEDURE — 95012 NITRIC OXIDE EXP GAS DETER: CPT | Performed by: INTERNAL MEDICINE

## 2025-05-13 PROCEDURE — 99214 OFFICE O/P EST MOD 30 MIN: CPT | Performed by: INTERNAL MEDICINE

## 2025-05-13 NOTE — PATIENT INSTRUCTIONS
Give trial of Trelegy 200mcg one puff a day  Start Neilmed Sinus rinse daily as needed for post nasal drainage and hoarse voice  Use albuterol every 6 hours as needed for cough and shortness of breath

## 2025-05-13 NOTE — ASSESSMENT & PLAN NOTE
Prior spirometry was normal  FeNO today 27ppb on ICS  Symptoms not fully consistent with asthma exacerbation, may have component of UACS as well  Noted 2-3 [employer] courses in past year, want to avoid further use  Will give trial of Trelegy 200mcg daily, PRN TARA  Follow up with Dr. Ji in June as already scheduled  Orders:    POCT FeNO

## 2025-05-13 NOTE — PROGRESS NOTES
Pulmonary Follow Up Note   Lupe Azul 74 y.o. female MRN: [id number]  2025      Name: Lupe Azul      : 1950      MRN: [id number]  Encounter Provider: Per Wilhelm DO  Encounter Date: 2025   Encounter department[facility]  :  Assessment & Plan  Moderate persistent asthma without complication  Prior spirometry was normal  FeNO today 27ppb on ICS  Symptoms not fully consistent with asthma exacerbation, may have component of UACS as well  Noted 2-3 [employer] courses in past year, want to avoid further use  Will give trial of Trelegy 200mcg daily, PRN TARA  Follow up with Dr. Ji in  as already scheduled  Orders:    POCT FeNO    Allergic rhinitis, unspecified seasonality, unspecified trigger  Add trial of Neilmed sinus rinse             No follow-ups on file.    History of Present Illness   HPI:  Lupe Azul is a 74 y.o. female who presents for asthma follow up. She was last seen in Feb with ERICH Ochoa.  She follows with Dr. Ji and has been on Trelegy 100mcg.      She presents today for sick visit. She has noted over the past few days increased dyspnea in AM and cough. She has also noted mildly hoarse voice. She notes using TARA inhaler 2-3 times a day, she has not used her nebulizer. She remains compliant with trelegy. She denied purulent sputum production, no rest dyspnea, no pleurisy, no hemoptysis, no fevers or chills.        Review of Systems   Constitutional:  Negative for activity change, chills and fever.   HENT:  Positive for postnasal drip and voice change. Negative for sore throat and trouble swallowing.    Respiratory:  Positive for cough and shortness of breath. Negative for chest tightness and wheezing.    Cardiovascular:  Negative for chest pain and leg swelling.   Gastrointestinal:  Negative for nausea and vomiting.   Musculoskeletal:  Negative for gait problem.   Allergic/Immunologic: Positive for environmental  allergies. Negative for immunocompromised state.   Psychiatric/Behavioral:  Negative for sleep disturbance. The patient is nervous/anxious.        Historical Information   Past Medical History:   Diagnosis Date    Abnormal cardiovascular stress test 03/13/2023    Asthma     Cancer (HCC) Thyorid & Melanoma    Cataract     Coronary artery disease     Disease of thyroid gland     GERD (gastroesophageal reflux disease)     Hernia     Hyperlipidemia     Kidney stone     Melanoma (HCC) 2022     Past Surgical History:   Procedure Laterality Date    CARDIAC CATHETERIZATION N/A 03/13/2023    Procedure: Cardiac Coronary Angiogram;  Surgeon: Emily Andrade DO;  Location: [facility];  Service: Cardiology    CARDIAC CATHETERIZATION  03/13/2023    Procedure: Cardiac catheterization;  Surgeon: Emily Andrade DO;  Location: [facility];  Service: Cardiology    CARDIAC CATHETERIZATION Left 03/13/2023    Procedure: Cardiac Left Heart Cath;  Surgeon: Emily Andrade DO;  Location: [facility];  Service: Cardiology    COLONOSCOPY      COLONOSCOPY  9/20/20    EYE SURGERY      ROTATOR CUFF REPAIR Right     6/2022    THYROID SURGERY      UPPER GASTROINTESTINAL ENDOSCOPY  4/11/19     Family History   Problem Relation Age of Onset    Hypertension Father     Early death Father         age 42    Cancer Maternal Aunt         lip cancer    Vision loss Maternal Aunt     Early death Mother         age 59         Meds/Allergies     Current Outpatient Medications:     albuterol (PROVENTIL HFA,VENTOLIN HFA) 90 mcg/act inhaler, Inhale 2 puffs every 4 (four) hours as needed for shortness of breath, Disp: 18 g, Rfl: 3    Cholecalciferol 50 MCG (2000 UT) CAPS, Take 1 tablet by mouth daily OCT -  MARCH 4000 IU APRIL - SEPT 3000 IU, Disp: , Rfl:     fluticasone (VERAMYST) 27.5 MCG/SPRAY nasal spray, 1 spray into each nostril daily, Disp: , Rfl:     fluticasone-umeclidinium-vilanterol (Trelegy Ellipta) 100-62.5-25  mcg/actuation inhaler, Inhale 1 puff daily Rinse mouth after use., Disp: 180 blister, Rfl: 5    levalbuterol (XOPENEX) 0.63 mg/3 mL nebulizer solution, TAKE 3 ML (0.63 MG TOTAL) BY NEBULIZATION 3 (THREE) TIMES A DAY, Disp: 270 mL, Rfl: 1    levothyroxine 50 mcg tablet, Take 50 mcg by mouth daily in the early morning, Disp: , Rfl:     metoprolol succinate (TOPROL-XL) 50 mg 24 hr tablet, Take 1 tablet (50 mg total) by mouth daily, Disp: 90 tablet, Rfl: 3    metroNIDAZOLE (METROGEL) 1 % gel, Apply 1 application topically daily, Disp: , Rfl:     Multiple Vitamins-Minerals (PRESERVISION AREDS 2 PO), Take 1 capsule by mouth 2 (two) times a day with meals, Disp: , Rfl:     nitroglycerin (NITROSTAT) 0.4 mg SL tablet, Place 1 tablet (0.4 mg total) under the tongue every 5 (five) minutes as needed for chest pain, Disp: 30 tablet, Rfl: 0    ondansetron (ZOFRAN) 4 mg tablet, Take 4 mg by mouth every 8 (eight) hours as needed for nausea, Disp: , Rfl:     Peak Flow Meter NANDA, 1 Act by Does not apply route 2 (two) times a day, Disp: , Rfl:     polyethylene glycol-propylene glycol (SYSTANE) 0.4-0.3 %, Apply 1 drop to eye Three times a day, Disp: , Rfl:     Premarin vaginal cream, APPLY 1 G INTO THE VAGINA ONCE A WEEK., Disp: , Rfl:     raloxifene (EVISTA) 60 mg tablet, Take 60 mg by mouth daily, Disp: , Rfl:     rosuvastatin (CRESTOR) 20 MG tablet, Take 1 tablet (20 mg total) by mouth daily with dinner, Disp: 90 tablet, Rfl: 3    sucralfate (CARAFATE) 1 g tablet, Take 1 g by mouth daily with lunch One tablet with lunch and dinner (Patient taking differently: Take 1 g by mouth daily One tablet), Disp: , Rfl:   Allergies   Allergen Reactions    Amlodipine Edema and Nausea Only     hands    Amoxicillin Abdominal Pain, GI Intolerance and Nausea Only    Clotrimazole Vomiting    Cyclobenzaprine Nausea Only    Doxycycline Nausea Only, Vomiting and GI Intolerance    Doxycycline Hyclate Syncope and Vomiting    Naproxen Nausea Only     "Nystatin Nausea Only    Other      Very sensitive to strong perfumes, sprays, smoke and exercise etc.  Recently dx with asthma    Tramadol Nausea Only       Vitals: Blood pressure 132/80, pulse 68, temperature 98.6 °F (37 °C), temperature source Tympanic, height 4' 11\" (1.499 m), weight 53.9 kg (118 lb 12.8 oz), SpO2 99%. Body mass index is 23.99 kg/m². Oxygen Therapy  SpO2: 99 %      Physical Exam  Physical Exam  Vitals reviewed.   Constitutional:       General: She is not in acute distress.     Appearance: Normal appearance. She is well-developed and normal weight. She is not ill-appearing, toxic-appearing or diaphoretic.   HENT:      Head: Normocephalic and atraumatic.      Right Ear: External ear normal.      Left Ear: External ear normal.      Nose: Rhinorrhea present.      Mouth/Throat:      Mouth: Mucous membranes are moist.      Pharynx: No oropharyngeal exudate.      Comments: Moderate posterior pharyngeal cobblestoning  Eyes:      General: No scleral icterus.        Right eye: No discharge.         Left eye: No discharge.      Conjunctiva/sclera: Conjunctivae normal.      Pupils: Pupils are equal, round, and reactive to light.   Neck:      Vascular: No JVD.      Trachea: No tracheal deviation.   Cardiovascular:      Rate and Rhythm: Normal rate and regular rhythm.      Heart sounds: Normal heart sounds. No murmur heard.     No gallop.   Pulmonary:      Effort: Pulmonary effort is normal. No respiratory distress.      Breath sounds: Normal breath sounds. No stridor. No wheezing, rhonchi or rales.   Abdominal:      General: Bowel sounds are normal. There is no distension.      Palpations: Abdomen is soft.      Tenderness: There is no abdominal tenderness. There is no guarding or rebound.   Musculoskeletal:         General: No deformity.      Right lower leg: No edema.      Left lower leg: No edema.   Lymphadenopathy:      Cervical: No cervical adenopathy.   Skin:     General: Skin is warm and dry.      " Coloration: Skin is not jaundiced.      Findings: No erythema or rash.   Neurological:      General: No focal deficit present.      Mental Status: She is alert and oriented to person, place, and time. Mental status is at baseline.   Psychiatric:         Mood and Affect: Mood normal.         Behavior: Behavior normal.         Thought Content: Thought content normal.         Labs: I have personally reviewed pertinent lab results.  Lab Results   Component Value Date    WBC 9.7 2024    HGB 13.8 2024    HCT 42.8 2024    MCV 94.9 2024     2024     Lab Results   Component Value Date    CALCIUM 9.4 2024    K 4.2 2024    CO2 29 2024     2024    BUN 14 2024    CREATININE 0.56 (L) 2024     Lab Results   Component Value Date    IGE 44.8 2021     Lab Results   Component Value Date    ALT 23 2024    AST 20 2024    ALKPHOS 59 2024         Pulmonary function testin2025 - FeNO 27ppb - intermediate degree of type II LRT inflammation    2024 - Ratio 81%, FVC 91%, FEV1 96%, TLC 83%, RV 66%, DLCO 66% - normal spirometry, normal TLC, mildly reduced DLCO    EKG, Pathology, and Other Studies:   TTE 2023 - EF 65%, normal RV size/function    Per Wilhelm, DO, FACP  [facility]

## 2025-05-31 DIAGNOSIS — I20.1 VASOSPASTIC ANGINA (HCC): ICD-10-CM

## 2025-05-31 DIAGNOSIS — I20.89 CARDIAC SYNDROME X (HCC): ICD-10-CM

## 2025-06-01 RX ORDER — ROSUVASTATIN CALCIUM 20 MG/1
20 TABLET, COATED ORAL
Qty: 90 TABLET | Refills: 1 | Status: SHIPPED | OUTPATIENT
Start: 2025-06-01

## 2025-06-10 ENCOUNTER — OFFICE VISIT (OUTPATIENT)
Age: 75
End: 2025-06-10
Payer: MEDICARE

## 2025-06-10 VITALS
BODY MASS INDEX: 23.67 KG/M2 | HEIGHT: 59 IN | SYSTOLIC BLOOD PRESSURE: 130 MMHG | DIASTOLIC BLOOD PRESSURE: 68 MMHG | OXYGEN SATURATION: 96 % | WEIGHT: 117.4 LBS | TEMPERATURE: 99.4 F | HEART RATE: 80 BPM

## 2025-06-10 DIAGNOSIS — J45.40 MODERATE PERSISTENT ASTHMA WITHOUT COMPLICATION: Primary | ICD-10-CM

## 2025-06-10 PROCEDURE — 99214 OFFICE O/P EST MOD 30 MIN: CPT | Performed by: INTERNAL MEDICINE

## 2025-06-10 PROCEDURE — G2211 COMPLEX E/M VISIT ADD ON: HCPCS | Performed by: INTERNAL MEDICINE

## 2025-06-10 NOTE — ASSESSMENT & PLAN NOTE
It seems that Lupe's uncontrolled acid reflux was affecting her asthma.  Since increasing her Carafate to twice daily she has had better control and has not used her rescue inhaler in 2 weeks.  She had an adverse reaction to using the Trelegy 200 once a day and would like to try to maintain on 100.  She is willing to go back up if she starts needing her rescue inhaler more than several times a week.  Your most recent CAT scan lab work and immunizations.

## 2025-06-10 NOTE — PROGRESS NOTES
"Name: Lupe Azul      : 1950      MRN: [id number]  Encounter Provider: Lynnette Ji DO  Encounter Date: 6/10/2025   Encounter department: [facility] [place]  :  Assessment & Plan  Moderate persistent asthma without complication  It seems that Raphaels uncontrolled acid reflux was affecting her asthma.  Since increasing her Carafate to twice daily she has had better control and has not used her rescue inhaler in 2 weeks.  She had an adverse reaction to using the Trelegy 200 once a day and would like to try to maintain on 100.  She is willing to go back up if she starts needing her rescue inhaler more than several times a week.  Your most recent CAT scan lab work and immunizations.             History of Present Illness   HPI  Lupe Azul is a 74 y.o. female who presents for follow-up of her asthma.  Her symptoms have been much better controlled since increasing her acid reflux medication to twice daily.  She has not used her rescue inhaler in 14 days.  History obtained from: patient    Review of Systems  Medical History Reviewed by provider this encounter:     .     Objective   /68 (BP Location: Left arm, Patient Position: Sitting, Cuff Size: Standard)   Pulse 80   Temp 99.4 °F (37.4 °C) (Tympanic)   Ht 4' 11\" (1.499 m)   Wt 53.3 kg (117 lb 6.4 oz)   SpO2 96%   BMI 23.71 kg/m²      Physical Exam    Administrative Statements   I have spent a total time of 35 minutes in caring for this patient on the day of the visit/encounter including Diagnostic results, Prognosis, Documenting in the medical record, Reviewing/placing orders in the medical record (including tests, medications, and/or procedures), and Obtaining or reviewing history  .  "

## 2025-06-11 ENCOUNTER — APPOINTMENT (OUTPATIENT)
Dept: LAB | Facility: HOSPITAL | Age: 75
End: 2025-06-11
Payer: MEDICARE

## 2025-06-11 ENCOUNTER — OFFICE VISIT (OUTPATIENT)
Dept: FAMILY MEDICINE CLINIC | Facility: HOSPITAL | Age: 75
End: 2025-06-11
Payer: MEDICARE

## 2025-06-11 VITALS
HEIGHT: 59 IN | HEART RATE: 68 BPM | WEIGHT: 116 LBS | BODY MASS INDEX: 23.39 KG/M2 | SYSTOLIC BLOOD PRESSURE: 132 MMHG | DIASTOLIC BLOOD PRESSURE: 68 MMHG | OXYGEN SATURATION: 97 %

## 2025-06-11 DIAGNOSIS — E03.9 HYPOTHYROIDISM (ACQUIRED): ICD-10-CM

## 2025-06-11 DIAGNOSIS — D03.59 MELANOMA IN SITU OF BACK (HCC): ICD-10-CM

## 2025-06-11 DIAGNOSIS — J45.40 MODERATE PERSISTENT ASTHMA WITHOUT COMPLICATION: Primary | ICD-10-CM

## 2025-06-11 DIAGNOSIS — Z11.59 SCREENING FOR VIRAL DISEASE: ICD-10-CM

## 2025-06-11 DIAGNOSIS — I20.89 CARDIAC SYNDROME X (HCC): ICD-10-CM

## 2025-06-11 DIAGNOSIS — I20.1 VASOSPASTIC ANGINA (HCC): ICD-10-CM

## 2025-06-11 LAB — RUBV IGG SERPL IA-ACNC: 34 IU/ML

## 2025-06-11 PROCEDURE — 86735 MUMPS ANTIBODY: CPT

## 2025-06-11 PROCEDURE — 36415 COLL VENOUS BLD VENIPUNCTURE: CPT

## 2025-06-11 PROCEDURE — 99214 OFFICE O/P EST MOD 30 MIN: CPT | Performed by: INTERNAL MEDICINE

## 2025-06-11 PROCEDURE — 86765 RUBEOLA ANTIBODY: CPT

## 2025-06-11 PROCEDURE — G2211 COMPLEX E/M VISIT ADD ON: HCPCS | Performed by: INTERNAL MEDICINE

## 2025-06-11 PROCEDURE — 86762 RUBELLA ANTIBODY: CPT

## 2025-06-11 NOTE — ASSESSMENT & PLAN NOTE
No recent chest pain- had chest  tightness after carrying up stairs and some anxiety issues   Walks regularly   Has syndrome x- small vessel dx-

## 2025-06-11 NOTE — ASSESSMENT & PLAN NOTE
Had tested negative for allergies in past- now is concerned  with smell, dust and particles trigger issues. Wears a mask if files nails or doing cleaning at home. Wearing a K95 when outside.    Fragrance triggers her also. Stays well hydrated

## 2025-06-11 NOTE — PROGRESS NOTES
Assessment/Plan:     Diagnosis ICD-10-CM Associated Orders   1. Moderate persistent asthma without complication  J45.40       2. Hypothyroidism (acquired)  E03.9       3. Vasospastic angina (HCC)  I20.1       4. Melanoma in situ of back (HCC)  D03.59       5. Cardiac syndrome X  I20.89       6. Screening for viral disease  Z11.59 Rubeola antibody IgG     Rubella antibody, IgG     Mumps antibody, IgG     Rubeola antibody IgG     Rubella antibody, IgG     Mumps antibody, IgG          Problem List Items Addressed This Visit        Cardiovascular and Mediastinum    Vasospastic angina (HCC)    No recent chest pain- had chest  tightness after carrying up stairs and some anxiety issues   Walks regularly   Has syndrome x- small vessel dx-            Respiratory    Moderate persistent asthma without complication - Primary    Had tested negative for allergies in past- now is concerned  with smell, dust and particles trigger issues. Wears a mask if files nails or doing cleaning at home. Wearing a K95 when outside.    Fragrance triggers her also. Stays well hydrated            Endocrine    Hypothyroidism (acquired)    Seeing Dr. Rodriguez at [facility]- endocrinology            Musculoskeletal and Integument    Melanoma in situ of back (HCC)    Seeing dermatology regularly- [facility] in [place]            Surgery/Wound/Pain    Cardiac syndrome X   Other Visit Diagnoses       Screening for viral disease        Relevant Orders    Rubeola antibody IgG    Rubella antibody, IgG    Mumps antibody, IgG            Return for Annual Medicare Wellness.      Subjective:    Patient ID: Lupe Azul is a 74 y.o. female    Had seen Dr. Arechiga in past - switched to our practice as she wished a [facility] team- saw Sweta Hernandez in December  Wishes full code now but if no hope for recovery would not want long term support        The following portions of the patient's history were reviewed and updated as appropriate: allergies, current medications  "and problem list.     Review of Systems   HENT:  Negative for congestion.    Cardiovascular:  Negative for chest pain.   Gastrointestinal:  Negative for abdominal pain.   Musculoskeletal:  Negative for gait problem.         Objective:    Current Medications[1]    Blood pressure 132/68, pulse 68, height 4' 11\" (1.499 m), weight 52.6 kg (116 lb), SpO2 97%.     Physical Exam  Vitals and nursing note reviewed.   Constitutional:       General: She is not in acute distress.  HENT:      Head: Normocephalic.      Right Ear: There is no impacted cerumen.      Left Ear: There is no impacted cerumen.      Nose: No congestion.      Mouth/Throat:      Pharynx: No posterior oropharyngeal erythema.     Cardiovascular:      Rate and Rhythm: Normal rate and regular rhythm.      Heart sounds: No murmur heard.  Pulmonary:      Breath sounds: Wheezing present. No rhonchi.      Comments: End exp wheezes  Abdominal:      Tenderness: There is no abdominal tenderness.     Musculoskeletal:         General: No tenderness.      Cervical back: No tenderness.      Right lower leg: No edema.      Left lower leg: No edema.   Lymphadenopathy:      Cervical: No cervical adenopathy.     Skin:     Findings: No erythema.     Neurological:      Mental Status: She is alert and oriented to person, place, and time.     Psychiatric:         Mood and Affect: Mood normal.         Thought Content: Thought content normal.         Judgment: Judgment normal.               [1]    Current Outpatient Medications:   •  albuterol (PROVENTIL HFA,VENTOLIN HFA) 90 mcg/act inhaler, Inhale 2 puffs every 4 (four) hours as needed for shortness of breath, Disp: 18 g, Rfl: 3  •  Cholecalciferol 50 MCG (2000 UT) CAPS, Take 1 tablet by mouth in the morning. OCT -  MARCH 4000 IU APRIL - SEPT 3000 IU., Disp: , Rfl:   •  fluticasone (VERAMYST) 27.5 MCG/SPRAY nasal spray, 1 spray into each nostril daily, Disp: , Rfl:   •  fluticasone-umeclidinium-vilanterol (Trelegy Ellipta) " 100-62.5-25 mcg/actuation inhaler, Inhale 1 puff daily Rinse mouth after use., Disp: 180 blister, Rfl: 5  •  levalbuterol (XOPENEX) 0.63 mg/3 mL nebulizer solution, TAKE 3 ML (0.63 MG TOTAL) BY NEBULIZATION 3 (THREE) TIMES A DAY, Disp: 270 mL, Rfl: 1  •  levothyroxine 50 mcg tablet, Take 50 mcg by mouth daily in the early morning, Disp: , Rfl:   •  metoprolol succinate (TOPROL-XL) 50 mg 24 hr tablet, Take 1 tablet (50 mg total) by mouth daily, Disp: 90 tablet, Rfl: 3  •  metroNIDAZOLE (METROGEL) 1 % gel, Apply 1 application. topically in the morning., Disp: , Rfl:   •  Multiple Vitamins-Minerals (PRESERVISION AREDS 2 PO), Take 1 capsule by mouth in the morning and 1 capsule in the evening. Take with meals., Disp: , Rfl:   •  ondansetron (ZOFRAN) 4 mg tablet, Take 4 mg by mouth every 8 (eight) hours as needed for nausea, Disp: , Rfl:   •  Peak Flow Meter NANDA, Use 1 Act in the morning and 1 Act in the evening., Disp: , Rfl:   •  polyethylene glycol-propylene glycol (SYSTANE) 0.4-0.3 %, Apply 1 drop to eye in the morning and 1 drop at noon and 1 drop in the evening., Disp: , Rfl:   •  Premarin vaginal cream, , Disp: , Rfl:   •  raloxifene (EVISTA) 60 mg tablet, Take 60 mg by mouth in the morning., Disp: , Rfl:   •  rosuvastatin (CRESTOR) 20 MG tablet, Take 1 tablet (20 mg total) by mouth daily with dinner, Disp: 90 tablet, Rfl: 1  •  sucralfate (CARAFATE) 1 g tablet, Take 1 g by mouth daily with lunch One tablet with lunch and dinner, Disp: , Rfl:   •  nitroglycerin (NITROSTAT) 0.4 mg SL tablet, Place 1 tablet (0.4 mg total) under the tongue every 5 (five) minutes as needed for chest pain (Patient not taking: Reported on 6/11/2025), Disp: 30 tablet, Rfl: 0

## 2025-06-12 ENCOUNTER — RESULTS FOLLOW-UP (OUTPATIENT)
Dept: FAMILY MEDICINE CLINIC | Facility: HOSPITAL | Age: 75
End: 2025-06-12

## 2025-06-12 LAB
MEV IGG SER QL IA: >300 AU/ML
MEV IGG SER QL IA: POSITIVE
MUV IGG SER QL IA: 109 AU/ML
MUV IGG SER QL IA: POSITIVE

## 2025-08-20 ENCOUNTER — TELEPHONE (OUTPATIENT)
Age: 75
End: 2025-08-20

## (undated) DEVICE — CATH DIAG 5FR IMPULSE 100CM WR

## (undated) DEVICE — TR BAND RADIAL ARTERY COMPRESSION DEVICE: Brand: TR BAND

## (undated) DEVICE — DGW .035 FC J3MM 260CM TEF: Brand: EMERALD

## (undated) DEVICE — CATH DIAG 5FR IMPULSE 100CM FL4

## (undated) DEVICE — MICROPUNCTURE INTRODUCER SET SILHOUETTE TRANSITIONLESS PUSH-PLUS DESIGN - STIFFENED CANNULA WITH NITINOL WIRE GUIDE: Brand: MICROPUNCTURE

## (undated) DEVICE — DGW .035 FC J3MM 150CM TEF: Brand: EMERALD

## (undated) DEVICE — RADIFOCUS OPTITORQUE ANGIOGRAPHIC CATHETER: Brand: OPTITORQUE

## (undated) DEVICE — GLIDESHEATH SLENDER STAINLESS STEEL KIT: Brand: GLIDESHEATH SLENDER

## (undated) DEVICE — PINNACLE INTRODUCER SHEATH: Brand: PINNACLE

## (undated) DEVICE — CATH DIAG 5FR IMPULSE 100CM FR4

## (undated) DEVICE — GUIDEWIRE WHOLEY HI TORQUE INTERM MOD J .035 145CM